# Patient Record
Sex: MALE | Race: WHITE | Employment: OTHER | URBAN - METROPOLITAN AREA
[De-identification: names, ages, dates, MRNs, and addresses within clinical notes are randomized per-mention and may not be internally consistent; named-entity substitution may affect disease eponyms.]

---

## 2018-05-07 ENCOUNTER — APPOINTMENT (OUTPATIENT)
Dept: CT IMAGING | Age: 23
End: 2018-05-07
Payer: COMMERCIAL

## 2018-05-07 ENCOUNTER — HOSPITAL ENCOUNTER (EMERGENCY)
Age: 23
Discharge: HOME | End: 2018-05-07
Attending: EMERGENCY MEDICINE
Payer: COMMERCIAL

## 2018-05-07 VITALS
RESPIRATION RATE: 16 BRPM | TEMPERATURE: 97.4 F | SYSTOLIC BLOOD PRESSURE: 137 MMHG | WEIGHT: 153 LBS | DIASTOLIC BLOOD PRESSURE: 83 MMHG | OXYGEN SATURATION: 96 % | BODY MASS INDEX: 23.19 KG/M2 | HEART RATE: 66 BPM | HEIGHT: 68 IN

## 2018-05-07 DIAGNOSIS — M25.511 CHRONIC RIGHT SHOULDER PAIN: ICD-10-CM

## 2018-05-07 DIAGNOSIS — G43.909 MIGRAINE WITHOUT STATUS MIGRAINOSUS, NOT INTRACTABLE, UNSPECIFIED MIGRAINE TYPE: Primary | ICD-10-CM

## 2018-05-07 DIAGNOSIS — G89.29 CHRONIC RIGHT SHOULDER PAIN: ICD-10-CM

## 2018-05-07 PROCEDURE — 6360000002 HC RX W HCPCS: Performed by: PHYSICIAN ASSISTANT

## 2018-05-07 PROCEDURE — 70450 CT HEAD/BRAIN W/O DYE: CPT

## 2018-05-07 PROCEDURE — 96374 THER/PROPH/DIAG INJ IV PUSH: CPT

## 2018-05-07 PROCEDURE — 99284 EMERGENCY DEPT VISIT MOD MDM: CPT

## 2018-05-07 RX ORDER — METOCLOPRAMIDE HYDROCHLORIDE 5 MG/ML
10 INJECTION INTRAMUSCULAR; INTRAVENOUS ONCE
Status: DISCONTINUED | OUTPATIENT
Start: 2018-05-07 | End: 2018-05-07 | Stop reason: HOSPADM

## 2018-05-07 RX ORDER — 0.9 % SODIUM CHLORIDE 0.9 %
10 VIAL (ML) INJECTION PRN
Status: DISCONTINUED | OUTPATIENT
Start: 2018-05-07 | End: 2018-05-07 | Stop reason: HOSPADM

## 2018-05-07 RX ORDER — DIPHENHYDRAMINE HYDROCHLORIDE 50 MG/ML
12.5 INJECTION INTRAMUSCULAR; INTRAVENOUS ONCE
Status: COMPLETED | OUTPATIENT
Start: 2018-05-07 | End: 2018-05-07

## 2018-05-07 RX ORDER — NAPROXEN 500 MG/1
500 TABLET ORAL 2 TIMES DAILY
Qty: 14 TABLET | Refills: 0 | Status: SHIPPED | OUTPATIENT
Start: 2018-05-07 | End: 2018-07-18

## 2018-05-07 RX ADMIN — DIPHENHYDRAMINE HYDROCHLORIDE 12.5 MG: 50 INJECTION, SOLUTION INTRAMUSCULAR; INTRAVENOUS at 15:58

## 2018-05-07 ASSESSMENT — PAIN DESCRIPTION - FREQUENCY: FREQUENCY: CONTINUOUS

## 2018-05-07 ASSESSMENT — PAIN DESCRIPTION - DESCRIPTORS: DESCRIPTORS: PATIENT UNABLE TO DESCRIBE

## 2018-05-07 ASSESSMENT — PAIN DESCRIPTION - ORIENTATION: ORIENTATION: RIGHT

## 2018-05-07 ASSESSMENT — PAIN DESCRIPTION - LOCATION: LOCATION: HEAD;SHOULDER

## 2018-05-07 ASSESSMENT — PAIN SCALES - GENERAL: PAINLEVEL_OUTOF10: 10

## 2018-05-07 ASSESSMENT — PAIN DESCRIPTION - PAIN TYPE: TYPE: CHRONIC PAIN

## 2018-05-07 ASSESSMENT — PAIN DESCRIPTION - ONSET: ONSET: ON-GOING

## 2018-05-10 ENCOUNTER — HOSPITAL ENCOUNTER (EMERGENCY)
Age: 23
Discharge: PSYCHIATRIC OR SUBSTANCE USE FACILITY | End: 2018-05-11
Attending: EMERGENCY MEDICINE
Payer: COMMERCIAL

## 2018-05-10 DIAGNOSIS — F32.A DEPRESSION, UNSPECIFIED DEPRESSION TYPE: Primary | ICD-10-CM

## 2018-05-10 LAB
ACETAMINOPHEN LEVEL: <5 MCG/ML (ref 10–30)
ALBUMIN SERPL-MCNC: 4.4 G/DL (ref 3.5–5.2)
ALP BLD-CCNC: 72 U/L (ref 40–129)
ALT SERPL-CCNC: 25 U/L (ref 0–40)
AMPHETAMINE SCREEN, URINE: NOT DETECTED
ANION GAP SERPL CALCULATED.3IONS-SCNC: 10 MMOL/L (ref 7–16)
AST SERPL-CCNC: 21 U/L (ref 0–39)
BARBITURATE SCREEN URINE: NOT DETECTED
BASOPHILS ABSOLUTE: 0.04 E9/L (ref 0–0.2)
BASOPHILS RELATIVE PERCENT: 0.4 % (ref 0–2)
BENZODIAZEPINE SCREEN, URINE: NOT DETECTED
BILIRUB SERPL-MCNC: 0.5 MG/DL (ref 0–1.2)
BILIRUBIN URINE: ABNORMAL
BLOOD, URINE: NEGATIVE
BUN BLDV-MCNC: 10 MG/DL (ref 6–20)
CALCIUM SERPL-MCNC: 9.2 MG/DL (ref 8.6–10.2)
CANNABINOID SCREEN URINE: POSITIVE
CHLORIDE BLD-SCNC: 104 MMOL/L (ref 98–107)
CLARITY: CLEAR
CO2: 31 MMOL/L (ref 22–29)
COCAINE METABOLITE SCREEN URINE: NOT DETECTED
COLOR: YELLOW
CREAT SERPL-MCNC: 1 MG/DL (ref 0.7–1.2)
EKG ATRIAL RATE: 60 BPM
EKG P AXIS: 38 DEGREES
EKG P-R INTERVAL: 148 MS
EKG Q-T INTERVAL: 388 MS
EKG QRS DURATION: 96 MS
EKG QTC CALCULATION (BAZETT): 388 MS
EKG R AXIS: 101 DEGREES
EKG T AXIS: 44 DEGREES
EKG VENTRICULAR RATE: 60 BPM
EOSINOPHILS ABSOLUTE: 0.06 E9/L (ref 0.05–0.5)
EOSINOPHILS RELATIVE PERCENT: 0.7 % (ref 0–6)
ETHANOL: <10 MG/DL (ref 0–0.08)
GFR AFRICAN AMERICAN: >60
GFR NON-AFRICAN AMERICAN: >60 ML/MIN/1.73
GLUCOSE BLD-MCNC: 75 MG/DL (ref 74–109)
GLUCOSE URINE: NEGATIVE MG/DL
HCT VFR BLD CALC: 48.5 % (ref 37–54)
HEMOGLOBIN: 16.6 G/DL (ref 12.5–16.5)
IMMATURE GRANULOCYTES #: 0.02 E9/L
IMMATURE GRANULOCYTES %: 0.2 % (ref 0–5)
KETONES, URINE: NEGATIVE MG/DL
LEUKOCYTE ESTERASE, URINE: NEGATIVE
LYMPHOCYTES ABSOLUTE: 2.72 E9/L (ref 1.5–4)
LYMPHOCYTES RELATIVE PERCENT: 30 % (ref 20–42)
MCH RBC QN AUTO: 31 PG (ref 26–35)
MCHC RBC AUTO-ENTMCNC: 34.2 % (ref 32–34.5)
MCV RBC AUTO: 90.7 FL (ref 80–99.9)
METHADONE SCREEN, URINE: NOT DETECTED
MONOCYTES ABSOLUTE: 0.48 E9/L (ref 0.1–0.95)
MONOCYTES RELATIVE PERCENT: 5.3 % (ref 2–12)
NEUTROPHILS ABSOLUTE: 5.75 E9/L (ref 1.8–7.3)
NEUTROPHILS RELATIVE PERCENT: 63.4 % (ref 43–80)
NITRITE, URINE: NEGATIVE
OPIATE SCREEN URINE: NOT DETECTED
PDW BLD-RTO: 12.1 FL (ref 11.5–15)
PH UA: 8 (ref 5–9)
PHENCYCLIDINE SCREEN URINE: NOT DETECTED
PLATELET # BLD: 209 E9/L (ref 130–450)
PMV BLD AUTO: 10.9 FL (ref 7–12)
POTASSIUM SERPL-SCNC: 3.9 MMOL/L (ref 3.5–5)
PROPOXYPHENE SCREEN: NOT DETECTED
PROTEIN UA: NEGATIVE MG/DL
RBC # BLD: 5.35 E12/L (ref 3.8–5.8)
SALICYLATE, SERUM: <0.3 MG/DL (ref 0–30)
SODIUM BLD-SCNC: 145 MMOL/L (ref 132–146)
SPECIFIC GRAVITY UA: 1.01 (ref 1–1.03)
T4 FREE: 1.53 NG/DL (ref 0.93–1.7)
TOTAL PROTEIN: 6.5 G/DL (ref 6.4–8.3)
TRICYCLIC ANTIDEPRESSANTS SCREEN SERUM: NEGATIVE NG/ML
TSH SERPL DL<=0.05 MIU/L-ACNC: 0.93 UIU/ML (ref 0.27–4.2)
UROBILINOGEN, URINE: >=8 E.U./DL
WBC # BLD: 9.1 E9/L (ref 4.5–11.5)

## 2018-05-10 PROCEDURE — 84443 ASSAY THYROID STIM HORMONE: CPT

## 2018-05-10 PROCEDURE — 81003 URINALYSIS AUTO W/O SCOPE: CPT

## 2018-05-10 PROCEDURE — 93005 ELECTROCARDIOGRAM TRACING: CPT | Performed by: PHYSICIAN ASSISTANT

## 2018-05-10 PROCEDURE — 80307 DRUG TEST PRSMV CHEM ANLYZR: CPT

## 2018-05-10 PROCEDURE — 99284 EMERGENCY DEPT VISIT MOD MDM: CPT

## 2018-05-10 PROCEDURE — 36415 COLL VENOUS BLD VENIPUNCTURE: CPT

## 2018-05-10 PROCEDURE — 85025 COMPLETE CBC W/AUTO DIFF WBC: CPT

## 2018-05-10 PROCEDURE — G0480 DRUG TEST DEF 1-7 CLASSES: HCPCS

## 2018-05-10 PROCEDURE — 80053 COMPREHEN METABOLIC PANEL: CPT

## 2018-05-10 PROCEDURE — 84439 ASSAY OF FREE THYROXINE: CPT

## 2018-05-10 RX ORDER — ARIPIPRAZOLE 30 MG/1
30 TABLET ORAL DAILY
COMMUNITY
End: 2021-03-12

## 2018-05-10 RX ORDER — LORAZEPAM 1 MG/1
1 TABLET ORAL 2 TIMES DAILY
COMMUNITY
End: 2018-07-18

## 2018-05-11 VITALS
HEART RATE: 67 BPM | SYSTOLIC BLOOD PRESSURE: 132 MMHG | TEMPERATURE: 97.8 F | RESPIRATION RATE: 18 BRPM | WEIGHT: 153 LBS | DIASTOLIC BLOOD PRESSURE: 65 MMHG | OXYGEN SATURATION: 94 % | BODY MASS INDEX: 23.26 KG/M2

## 2018-05-15 LAB — CANNABINOIDS CONF, URINE: >500 NG/ML

## 2018-07-18 ENCOUNTER — HOSPITAL ENCOUNTER (EMERGENCY)
Age: 23
Discharge: HOME | End: 2018-07-18
Attending: EMERGENCY MEDICINE
Payer: COMMERCIAL

## 2018-07-18 VITALS
HEART RATE: 78 BPM | SYSTOLIC BLOOD PRESSURE: 139 MMHG | WEIGHT: 160 LBS | TEMPERATURE: 98.2 F | OXYGEN SATURATION: 98 % | BODY MASS INDEX: 24.25 KG/M2 | RESPIRATION RATE: 16 BRPM | HEIGHT: 68 IN | DIASTOLIC BLOOD PRESSURE: 84 MMHG

## 2018-07-18 DIAGNOSIS — F41.1 ANXIETY STATE: ICD-10-CM

## 2018-07-18 DIAGNOSIS — F32.A DEPRESSION, UNSPECIFIED DEPRESSION TYPE: Primary | ICD-10-CM

## 2018-07-18 LAB
ACETAMINOPHEN LEVEL: <5 MCG/ML (ref 10–30)
ALBUMIN SERPL-MCNC: 4.6 G/DL (ref 3.5–5.2)
ALP BLD-CCNC: 74 U/L (ref 40–129)
ALT SERPL-CCNC: 39 U/L (ref 0–40)
ANION GAP SERPL CALCULATED.3IONS-SCNC: 8 MMOL/L (ref 7–16)
AST SERPL-CCNC: 26 U/L (ref 0–39)
BASOPHILS ABSOLUTE: 0.03 E9/L (ref 0–0.2)
BASOPHILS RELATIVE PERCENT: 0.3 % (ref 0–2)
BILIRUB SERPL-MCNC: 0.3 MG/DL (ref 0–1.2)
BILIRUBIN URINE: NEGATIVE
BLOOD, URINE: NEGATIVE
BUN BLDV-MCNC: 11 MG/DL (ref 6–20)
CALCIUM SERPL-MCNC: 9.2 MG/DL (ref 8.6–10.2)
CHLORIDE BLD-SCNC: 104 MMOL/L (ref 98–107)
CLARITY: CLEAR
CO2: 30 MMOL/L (ref 22–29)
COLOR: YELLOW
CREAT SERPL-MCNC: 1 MG/DL (ref 0.7–1.2)
EOSINOPHILS ABSOLUTE: 0.06 E9/L (ref 0.05–0.5)
EOSINOPHILS RELATIVE PERCENT: 0.6 % (ref 0–6)
ETHANOL: <10 MG/DL (ref 0–0.08)
GFR AFRICAN AMERICAN: >60
GFR NON-AFRICAN AMERICAN: >60 ML/MIN/1.73
GLUCOSE BLD-MCNC: 79 MG/DL (ref 74–109)
GLUCOSE URINE: NEGATIVE MG/DL
HCT VFR BLD CALC: 51.5 % (ref 37–54)
HEMOGLOBIN: 17.8 G/DL (ref 12.5–16.5)
IMMATURE GRANULOCYTES #: 0.02 E9/L
IMMATURE GRANULOCYTES %: 0.2 % (ref 0–5)
KETONES, URINE: NEGATIVE MG/DL
LEUKOCYTE ESTERASE, URINE: NEGATIVE
LYMPHOCYTES ABSOLUTE: 2.69 E9/L (ref 1.5–4)
LYMPHOCYTES RELATIVE PERCENT: 26.2 % (ref 20–42)
MCH RBC QN AUTO: 30.8 PG (ref 26–35)
MCHC RBC AUTO-ENTMCNC: 34.6 % (ref 32–34.5)
MCV RBC AUTO: 89.1 FL (ref 80–99.9)
MONOCYTES ABSOLUTE: 0.52 E9/L (ref 0.1–0.95)
MONOCYTES RELATIVE PERCENT: 5.1 % (ref 2–12)
NEUTROPHILS ABSOLUTE: 6.94 E9/L (ref 1.8–7.3)
NEUTROPHILS RELATIVE PERCENT: 67.6 % (ref 43–80)
NITRITE, URINE: NEGATIVE
PDW BLD-RTO: 12.4 FL (ref 11.5–15)
PH UA: 8 (ref 5–9)
PLATELET # BLD: 218 E9/L (ref 130–450)
PMV BLD AUTO: 10.6 FL (ref 7–12)
POTASSIUM SERPL-SCNC: 4.3 MMOL/L (ref 3.5–5)
PROTEIN UA: NEGATIVE MG/DL
RBC # BLD: 5.78 E12/L (ref 3.8–5.8)
SALICYLATE, SERUM: <0.3 MG/DL (ref 0–30)
SODIUM BLD-SCNC: 142 MMOL/L (ref 132–146)
SPECIFIC GRAVITY UA: 1.01 (ref 1–1.03)
TOTAL PROTEIN: 7 G/DL (ref 6.4–8.3)
TRICYCLIC ANTIDEPRESSANTS SCREEN SERUM: NEGATIVE NG/ML
UROBILINOGEN, URINE: 0.2 E.U./DL
WBC # BLD: 10.3 E9/L (ref 4.5–11.5)

## 2018-07-18 PROCEDURE — 81003 URINALYSIS AUTO W/O SCOPE: CPT

## 2018-07-18 PROCEDURE — 99284 EMERGENCY DEPT VISIT MOD MDM: CPT

## 2018-07-18 PROCEDURE — 85025 COMPLETE CBC W/AUTO DIFF WBC: CPT

## 2018-07-18 PROCEDURE — G0480 DRUG TEST DEF 1-7 CLASSES: HCPCS

## 2018-07-18 PROCEDURE — 80307 DRUG TEST PRSMV CHEM ANLYZR: CPT

## 2018-07-18 PROCEDURE — 36415 COLL VENOUS BLD VENIPUNCTURE: CPT

## 2018-07-18 PROCEDURE — 80053 COMPREHEN METABOLIC PANEL: CPT

## 2018-07-18 RX ORDER — BUSPIRONE HYDROCHLORIDE 5 MG/1
5 TABLET ORAL 2 TIMES DAILY
Status: ON HOLD | COMMUNITY
End: 2021-03-18 | Stop reason: HOSPADM

## 2018-07-18 ASSESSMENT — PATIENT HEALTH QUESTIONNAIRE - PHQ9: SUM OF ALL RESPONSES TO PHQ QUESTIONS 1-9: 4

## 2018-07-18 NOTE — ED NOTES
Pt agreeable to discharge and contracts for safety pt escorted to waiting area to await transportation      Eb Soliz RN  07/18/18 [address]

## 2018-07-18 NOTE — ED PROVIDER NOTES
fL    Platelets 835 967 - 592 E9/L    MPV 10.6 7.0 - 12.0 fL    Neutrophils % 67.6 43.0 - 80.0 %    Immature Granulocytes % 0.2 0.0 - 5.0 %    Lymphocytes % 26.2 20.0 - 42.0 %    Monocytes % 5.1 2.0 - 12.0 %    Eosinophils % 0.6 0.0 - 6.0 %    Basophils % 0.3 0.0 - 2.0 %    Neutrophils # 6.94 1.80 - 7.30 E9/L    Immature Granulocytes # 0.02 E9/L    Lymphocytes # 2.69 1.50 - 4.00 E9/L    Monocytes # 0.52 0.10 - 0.95 E9/L    Eosinophils # 0.06 0.05 - 0.50 E9/L    Basophils # 0.03 0.00 - 0.20 E9/L   Comprehensive Metabolic Panel   Result Value Ref Range    Sodium 142 132 - 146 mmol/L    Potassium 4.3 3.5 - 5.0 mmol/L    Chloride 104 98 - 107 mmol/L    CO2 30 (H) 22 - 29 mmol/L    Anion Gap 8 7 - 16 mmol/L    Glucose 79 74 - 109 mg/dL    BUN 11 6 - 20 mg/dL    CREATININE 1.0 0.7 - 1.2 mg/dL    GFR Non-African American >60 >=60 mL/min/1.73    GFR African American >60     Calcium 9.2 8.6 - 10.2 mg/dL    Total Protein 7.0 6.4 - 8.3 g/dL    Alb 4.6 3.5 - 5.2 g/dL    Total Bilirubin 0.3 0.0 - 1.2 mg/dL    Alkaline Phosphatase 74 40 - 129 U/L    ALT 39 0 - 40 U/L    AST 26 0 - 39 U/L   Serum Drug Screen   Result Value Ref Range    Ethanol Lvl <10 mg/dL    Acetaminophen Level <5.0 (L) 10.0 - 84.5 mcg/mL    Salicylate, Serum <4.2 0.0 - 30.0 mg/dL    TCA Scrn NEGATIVE Cutoff:300 ng/mL   Urinalysis   Result Value Ref Range    Color, UA Yellow Straw/Yellow    Clarity, UA Clear Clear    Glucose, Ur Negative Negative mg/dL    Bilirubin Urine Negative Negative    Ketones, Urine Negative Negative mg/dL    Specific Gravity, UA 1.010 1.005 - 1.030    Blood, Urine Negative Negative    pH, UA 8.0 5.0 - 9.0    Protein, UA Negative Negative mg/dL    Urobilinogen, Urine 0.2 <2.0 E.U./dL    Nitrite, Urine Negative Negative    Leukocyte Esterase, Urine Negative Negative       RADIOLOGY:  Interpreted by Radiologist.  No orders to display       ------------------------- NURSING NOTES AND VITALS REVIEWED ---------------------------   The nursing

## 2018-07-18 NOTE — ED NOTES
FIRST PROVIDER CONTACT ASSESSMENT NOTE      Department of Emergency Medicine   7/18/18  10:32 AM    Chief Complaint: Psychiatric Evaluation (treats at [facility], \"I think my anxiety and depression is getting worse\" Denies SI/HI/A/V hallucinations)      History of Present Illness:    Souleymane Shukla is a 20 y.o. male who presents to the ED by private car for ongoing depression. Denies suicidal thoughts. Follows with [facility]. Focused Screening Exam:  Constitutional:  Alert, appears stated age and is in no distress. *ALLERGIES*     Patient has no known allergies.      ED Triage Vitals [07/18/18 1030]   BP Temp Temp Source Pulse Resp SpO2 Height Weight   139/84 98.6 °F (37 °C) Temporal 77 15 98 % 5' 8\" (1.727 m) 160 lb (72.6 kg)        Initial Plan of Care:  Initiate Treatment-Testing, Proceed toTreatment Area When Bed Available for ED Attending/MLP to Continue Care    -----------------END OF FIRST PROVIDER CONTACT ASSESSMENT NOTE--------------  Electronically signed by Jackeline Cagle PA-C   DD: 7/18/18       Jackeline Cagle PA-C  07/18/18 1030

## 2018-07-19 NOTE — ED NOTES
Pt is a 21 yo male who is a walk-in to the ED, pt not on a pink slip, accompanied by no one. Per triage: Treats at [facility], [address] my anxiety and depression is getting worse. \" Denies SI/HI/A/V hallucinations. Per pt:\" I've been having a little more anxiety, I tend to get depressed, I'm not suicidal or homicidal, I just think I need something to do, I don't get out of the house much. \"    Reports single, no children, lives with parents not employed, applied SSI. Reports no significant stressors at this time. Reports open at [facility], reports prescribed Abilify, Buspar, Seroquel. Hx of psych admission 7-S 4/29/2017,  And 11/16/2016, hx at [facility]. Reports cannabis use, very infrequent alcohol use, reports no hx of D&A treatment. Denies suicidal ideation intent or plan, reports hx of 1 previous attempt by overdose on 10 Seroquel 100mg which he was not treated for, denies any other attempts, denies homicidal ideation intent or plan. Alert, oriented x3, mood stable, affect broad, eye contact good, behavior is cooperative, appropriate, speech is normal in rate flow and volume, thought form is logical, thought content is clear, denies A/V hallucinations, delusions, paranoia. Discussed IOP with pt, reports he thinks this is a good idea, given information, also given information on [facility] and [facility] groups, request faxed to [facility], pt reports he will present either tomorrow morning or Monday morning. Discussed discharge to [facility] with Dr Elie Alicia who is agreeable.       [address] [facility], [place], [place]  07/18/18 [id number]

## 2019-11-18 ENCOUNTER — HOSPITAL ENCOUNTER (EMERGENCY)
Age: 24
Discharge: HOME | End: 2019-11-18
Attending: EMERGENCY MEDICINE
Payer: COMMERCIAL

## 2019-11-18 VITALS
WEIGHT: 165 LBS | HEART RATE: 96 BPM | TEMPERATURE: 98.3 F | SYSTOLIC BLOOD PRESSURE: 146 MMHG | OXYGEN SATURATION: 95 % | DIASTOLIC BLOOD PRESSURE: 88 MMHG | BODY MASS INDEX: 25.09 KG/M2

## 2019-11-18 DIAGNOSIS — F39 MOOD DISORDER (HCC): Primary | ICD-10-CM

## 2019-11-18 LAB
ACETAMINOPHEN LEVEL: <5 MCG/ML (ref 10–30)
ALBUMIN SERPL-MCNC: 4.5 G/DL (ref 3.5–5.2)
ALP BLD-CCNC: 66 U/L (ref 40–129)
ALT SERPL-CCNC: 20 U/L (ref 0–40)
AMPHETAMINE SCREEN, URINE: NOT DETECTED
ANION GAP SERPL CALCULATED.3IONS-SCNC: 12 MMOL/L (ref 7–16)
AST SERPL-CCNC: 21 U/L (ref 0–39)
BARBITURATE SCREEN URINE: NOT DETECTED
BASOPHILS ABSOLUTE: 0.04 E9/L (ref 0–0.2)
BASOPHILS RELATIVE PERCENT: 0.4 % (ref 0–2)
BENZODIAZEPINE SCREEN, URINE: NOT DETECTED
BILIRUB SERPL-MCNC: 0.4 MG/DL (ref 0–1.2)
BILIRUBIN URINE: NEGATIVE
BLOOD, URINE: NEGATIVE
BUN BLDV-MCNC: 12 MG/DL (ref 6–20)
CALCIUM SERPL-MCNC: 9.5 MG/DL (ref 8.6–10.2)
CANNABINOID SCREEN URINE: POSITIVE
CHLORIDE BLD-SCNC: 103 MMOL/L (ref 98–107)
CLARITY: CLEAR
CO2: 29 MMOL/L (ref 22–29)
COCAINE METABOLITE SCREEN URINE: NOT DETECTED
COLOR: YELLOW
CREAT SERPL-MCNC: 1.1 MG/DL (ref 0.7–1.2)
EOSINOPHILS ABSOLUTE: 0.09 E9/L (ref 0.05–0.5)
EOSINOPHILS RELATIVE PERCENT: 0.9 % (ref 0–6)
ETHANOL: <10 MG/DL (ref 0–0.08)
FENTANYL SCREEN, URINE: NOT DETECTED
GFR AFRICAN AMERICAN: >60
GFR NON-AFRICAN AMERICAN: >60 ML/MIN/1.73
GLUCOSE BLD-MCNC: 73 MG/DL (ref 74–99)
GLUCOSE URINE: NEGATIVE MG/DL
HCT VFR BLD CALC: 51.2 % (ref 37–54)
HEMOGLOBIN: 17.5 G/DL (ref 12.5–16.5)
IMMATURE GRANULOCYTES #: 0.02 E9/L
IMMATURE GRANULOCYTES %: 0.2 % (ref 0–5)
KETONES, URINE: NEGATIVE MG/DL
LEUKOCYTE ESTERASE, URINE: NEGATIVE
LYMPHOCYTES ABSOLUTE: 3.07 E9/L (ref 1.5–4)
LYMPHOCYTES RELATIVE PERCENT: 32.1 % (ref 20–42)
Lab: ABNORMAL
MCH RBC QN AUTO: 31.1 PG (ref 26–35)
MCHC RBC AUTO-ENTMCNC: 34.2 % (ref 32–34.5)
MCV RBC AUTO: 91.1 FL (ref 80–99.9)
METHADONE SCREEN, URINE: NOT DETECTED
MONOCYTES ABSOLUTE: 0.57 E9/L (ref 0.1–0.95)
MONOCYTES RELATIVE PERCENT: 6 % (ref 2–12)
NEUTROPHILS ABSOLUTE: 5.76 E9/L (ref 1.8–7.3)
NEUTROPHILS RELATIVE PERCENT: 60.4 % (ref 43–80)
NITRITE, URINE: NEGATIVE
OPIATE SCREEN URINE: NOT DETECTED
OXYCODONE URINE: NOT DETECTED
PDW BLD-RTO: 12.3 FL (ref 11.5–15)
PH UA: 6.5 (ref 5–9)
PHENCYCLIDINE SCREEN URINE: NOT DETECTED
PLATELET # BLD: 233 E9/L (ref 130–450)
PMV BLD AUTO: 10.5 FL (ref 7–12)
POTASSIUM SERPL-SCNC: 3.6 MMOL/L (ref 3.5–5)
PROTEIN UA: NEGATIVE MG/DL
RBC # BLD: 5.62 E12/L (ref 3.8–5.8)
SALICYLATE, SERUM: <0.3 MG/DL (ref 0–30)
SODIUM BLD-SCNC: 144 MMOL/L (ref 132–146)
SPECIFIC GRAVITY UA: 1.02 (ref 1–1.03)
T4 TOTAL: 7.3 MCG/DL (ref 4.5–11.7)
TOTAL PROTEIN: 6.7 G/DL (ref 6.4–8.3)
TRICYCLIC ANTIDEPRESSANTS SCREEN SERUM: NEGATIVE NG/ML
TSH SERPL DL<=0.05 MIU/L-ACNC: 1.4 UIU/ML (ref 0.27–4.2)
UROBILINOGEN, URINE: 1 E.U./DL
WBC # BLD: 9.6 E9/L (ref 4.5–11.5)

## 2019-11-18 PROCEDURE — 80307 DRUG TEST PRSMV CHEM ANLYZR: CPT

## 2019-11-18 PROCEDURE — 99284 EMERGENCY DEPT VISIT MOD MDM: CPT

## 2019-11-18 PROCEDURE — 36415 COLL VENOUS BLD VENIPUNCTURE: CPT

## 2019-11-18 PROCEDURE — 84443 ASSAY THYROID STIM HORMONE: CPT

## 2019-11-18 PROCEDURE — 81003 URINALYSIS AUTO W/O SCOPE: CPT

## 2019-11-18 PROCEDURE — G0480 DRUG TEST DEF 1-7 CLASSES: HCPCS

## 2019-11-18 PROCEDURE — 80053 COMPREHEN METABOLIC PANEL: CPT

## 2019-11-18 PROCEDURE — 84436 ASSAY OF TOTAL THYROXINE: CPT

## 2019-11-18 PROCEDURE — 85025 COMPLETE CBC W/AUTO DIFF WBC: CPT

## 2020-09-18 ENCOUNTER — HOSPITAL ENCOUNTER (EMERGENCY)
Age: 25
Discharge: HOME | End: 2020-09-18
Payer: COMMERCIAL

## 2020-09-18 VITALS
DIASTOLIC BLOOD PRESSURE: 81 MMHG | SYSTOLIC BLOOD PRESSURE: 135 MMHG | TEMPERATURE: 97.6 F | WEIGHT: 150 LBS | BODY MASS INDEX: 22.22 KG/M2 | HEIGHT: 69 IN | RESPIRATION RATE: 14 BRPM | HEART RATE: 98 BPM | OXYGEN SATURATION: 98 %

## 2020-09-18 PROCEDURE — 99283 EMERGENCY DEPT VISIT LOW MDM: CPT

## 2020-09-18 PROCEDURE — 6370000000 HC RX 637 (ALT 250 FOR IP): Performed by: NURSE PRACTITIONER

## 2020-09-18 PROCEDURE — 99284 EMERGENCY DEPT VISIT MOD MDM: CPT

## 2020-09-18 RX ORDER — IBUPROFEN 800 MG/1
800 TABLET ORAL ONCE
Status: COMPLETED | OUTPATIENT
Start: 2020-09-18 | End: 2020-09-18

## 2020-09-18 RX ORDER — AMOXICILLIN AND CLAVULANATE POTASSIUM 875; 125 MG/1; MG/1
1 TABLET, FILM COATED ORAL 2 TIMES DAILY
Qty: 20 TABLET | Refills: 0 | Status: SHIPPED | OUTPATIENT
Start: 2020-09-18 | End: 2020-09-28

## 2020-09-18 RX ORDER — IBUPROFEN 800 MG/1
800 TABLET ORAL 2 TIMES DAILY PRN
Qty: 10 TABLET | Refills: 0 | Status: ON HOLD | OUTPATIENT
Start: 2020-09-18 | End: 2021-03-18 | Stop reason: HOSPADM

## 2020-09-18 RX ORDER — AMOXICILLIN AND CLAVULANATE POTASSIUM 875; 125 MG/1; MG/1
1 TABLET, FILM COATED ORAL ONCE
Status: COMPLETED | OUTPATIENT
Start: 2020-09-18 | End: 2020-09-18

## 2020-09-18 RX ADMIN — IBUPROFEN 800 MG: 800 TABLET ORAL at 14:40

## 2020-09-18 RX ADMIN — AMOXICILLIN AND CLAVULANATE POTASSIUM 1 TABLET: 875; 125 TABLET, FILM COATED ORAL at 14:40

## 2020-09-18 ASSESSMENT — PAIN SCALES - GENERAL: PAINLEVEL_OUTOF10: 10

## 2020-12-21 ENCOUNTER — HOSPITAL ENCOUNTER (EMERGENCY)
Age: 25
Discharge: HOME | End: 2020-12-21
Payer: COMMERCIAL

## 2020-12-21 VITALS
HEART RATE: 89 BPM | TEMPERATURE: 98.4 F | DIASTOLIC BLOOD PRESSURE: 86 MMHG | RESPIRATION RATE: 16 BRPM | OXYGEN SATURATION: 96 % | BODY MASS INDEX: 22.22 KG/M2 | SYSTOLIC BLOOD PRESSURE: 135 MMHG | HEIGHT: 69 IN | WEIGHT: 150 LBS

## 2020-12-21 LAB
ACETAMINOPHEN LEVEL: <5 MCG/ML (ref 10–30)
ALBUMIN SERPL-MCNC: 4.4 G/DL (ref 3.5–5.2)
ALP BLD-CCNC: 84 U/L (ref 40–129)
ALT SERPL-CCNC: 34 U/L (ref 0–40)
AMPHETAMINE SCREEN, URINE: NOT DETECTED
ANION GAP SERPL CALCULATED.3IONS-SCNC: 7 MMOL/L (ref 7–16)
AST SERPL-CCNC: 39 U/L (ref 0–39)
BARBITURATE SCREEN URINE: NOT DETECTED
BASOPHILS ABSOLUTE: 0.03 E9/L (ref 0–0.2)
BASOPHILS RELATIVE PERCENT: 0.3 % (ref 0–2)
BENZODIAZEPINE SCREEN, URINE: NOT DETECTED
BILIRUB SERPL-MCNC: 0.5 MG/DL (ref 0–1.2)
BILIRUBIN URINE: NEGATIVE
BLOOD, URINE: NEGATIVE
BUN BLDV-MCNC: 10 MG/DL (ref 6–20)
CALCIUM SERPL-MCNC: 9.9 MG/DL (ref 8.6–10.2)
CANNABINOID SCREEN URINE: POSITIVE
CHLORIDE BLD-SCNC: 103 MMOL/L (ref 98–107)
CLARITY: CLEAR
CO2: 30 MMOL/L (ref 22–29)
COCAINE METABOLITE SCREEN URINE: NOT DETECTED
COLOR: YELLOW
CREAT SERPL-MCNC: 0.9 MG/DL (ref 0.7–1.2)
EOSINOPHILS ABSOLUTE: 0.08 E9/L (ref 0.05–0.5)
EOSINOPHILS RELATIVE PERCENT: 0.8 % (ref 0–6)
ETHANOL: <10 MG/DL (ref 0–0.08)
FENTANYL SCREEN, URINE: NOT DETECTED
GFR AFRICAN AMERICAN: >60
GFR NON-AFRICAN AMERICAN: >60 ML/MIN/1.73
GLUCOSE BLD-MCNC: 102 MG/DL (ref 74–99)
GLUCOSE URINE: NEGATIVE MG/DL
HCT VFR BLD CALC: 47.9 % (ref 37–54)
HEMOGLOBIN: 16.2 G/DL (ref 12.5–16.5)
IMMATURE GRANULOCYTES #: 0.02 E9/L
IMMATURE GRANULOCYTES %: 0.2 % (ref 0–5)
KETONES, URINE: NEGATIVE MG/DL
LEUKOCYTE ESTERASE, URINE: NEGATIVE
LYMPHOCYTES ABSOLUTE: 2.72 E9/L (ref 1.5–4)
LYMPHOCYTES RELATIVE PERCENT: 26.8 % (ref 20–42)
Lab: ABNORMAL
MCH RBC QN AUTO: 30.8 PG (ref 26–35)
MCHC RBC AUTO-ENTMCNC: 33.8 % (ref 32–34.5)
MCV RBC AUTO: 91.1 FL (ref 80–99.9)
METHADONE SCREEN, URINE: NOT DETECTED
MONOCYTES ABSOLUTE: 0.66 E9/L (ref 0.1–0.95)
MONOCYTES RELATIVE PERCENT: 6.5 % (ref 2–12)
NEUTROPHILS ABSOLUTE: 6.63 E9/L (ref 1.8–7.3)
NEUTROPHILS RELATIVE PERCENT: 65.4 % (ref 43–80)
NITRITE, URINE: NEGATIVE
OPIATE SCREEN URINE: NOT DETECTED
OXYCODONE URINE: NOT DETECTED
PDW BLD-RTO: 12.3 FL (ref 11.5–15)
PH UA: 6 (ref 5–9)
PHENCYCLIDINE SCREEN URINE: NOT DETECTED
PLATELET # BLD: 246 E9/L (ref 130–450)
PMV BLD AUTO: 10.7 FL (ref 7–12)
POTASSIUM REFLEX MAGNESIUM: 4.2 MMOL/L (ref 3.5–5)
PROTEIN UA: NEGATIVE MG/DL
RBC # BLD: 5.26 E12/L (ref 3.8–5.8)
SALICYLATE, SERUM: <0.3 MG/DL (ref 0–30)
SODIUM BLD-SCNC: 140 MMOL/L (ref 132–146)
SPECIFIC GRAVITY UA: <=1.005 (ref 1–1.03)
TOTAL PROTEIN: 7.1 G/DL (ref 6.4–8.3)
TRICYCLIC ANTIDEPRESSANTS SCREEN SERUM: NEGATIVE NG/ML
TROPONIN: <0.01 NG/ML (ref 0–0.03)
UROBILINOGEN, URINE: 1 E.U./DL
WBC # BLD: 10.1 E9/L (ref 4.5–11.5)

## 2020-12-21 PROCEDURE — 96372 THER/PROPH/DIAG INJ SC/IM: CPT

## 2020-12-21 PROCEDURE — 80307 DRUG TEST PRSMV CHEM ANLYZR: CPT

## 2020-12-21 PROCEDURE — 6360000002 HC RX W HCPCS: Performed by: PHYSICIAN ASSISTANT

## 2020-12-21 PROCEDURE — G0480 DRUG TEST DEF 1-7 CLASSES: HCPCS

## 2020-12-21 PROCEDURE — 99284 EMERGENCY DEPT VISIT MOD MDM: CPT

## 2020-12-21 PROCEDURE — 80053 COMPREHEN METABOLIC PANEL: CPT

## 2020-12-21 PROCEDURE — 2580000003 HC RX 258: Performed by: PHYSICIAN ASSISTANT

## 2020-12-21 PROCEDURE — 84484 ASSAY OF TROPONIN QUANT: CPT

## 2020-12-21 PROCEDURE — 81003 URINALYSIS AUTO W/O SCOPE: CPT

## 2020-12-21 PROCEDURE — 85025 COMPLETE CBC W/AUTO DIFF WBC: CPT

## 2020-12-21 RX ORDER — ORPHENADRINE CITRATE 30 MG/ML
60 INJECTION INTRAMUSCULAR; INTRAVENOUS ONCE
Status: COMPLETED | OUTPATIENT
Start: 2020-12-21 | End: 2020-12-21

## 2020-12-21 RX ORDER — 0.9 % SODIUM CHLORIDE 0.9 %
1000 INTRAVENOUS SOLUTION INTRAVENOUS ONCE
Status: COMPLETED | OUTPATIENT
Start: 2020-12-21 | End: 2020-12-21

## 2020-12-21 RX ADMIN — ORPHENADRINE CITRATE 60 MG: 30 INJECTION INTRAMUSCULAR; INTRAVENOUS at 14:28

## 2020-12-21 RX ADMIN — SODIUM CHLORIDE 1000 ML: 9 INJECTION, SOLUTION INTRAVENOUS at 14:28

## 2020-12-21 NOTE — ED NOTES
FIRST PROVIDER CONTACT ASSESSMENT NOTE      Department of Emergency Medicine   12/21/20  1:19 PM EST    Chief Complaint: Spasms (body spasms, denies pain)    History of Present Illness:   Virginia Glover is a 30 y.o. male who presents to the ED for feeling like he is having tremors and twitches to both upper extremities and entire body. He denies numbness/tingling, chest pain, SOB, or recent trauma/injury. He denies recent drug use. He does have a history of \"pinched nerve in his neck\" but that improved with PT. He does report marijuana use. He denies any new medications. Focused Physical Exam:  VS:  /86   Pulse 89   Temp 98.4 °F (36.9 °C) (Tympanic)   Resp 16   Ht 5' 9\" (1.753 m)   Wt 150 lb (68 kg)   SpO2 96%   BMI 22.15 kg/m²      General: Alert and in no apparent distress, appears mildly anxious  CVS: Regular rate for age, normal rhythm  Resp: Clear and equal bilaterally with good airflow, no respiratory distress      Medical History:  has a past medical history of ADD (attention deficit disorder with hyperactivity), Anxiety, Cerebral hemorrhage ([address] Cerebral palsy ([address] and Depression. Surgical History:  has a past surgical history that includes Foot surgery and hernia repair. Social History:  reports that he has been smoking cigarettes. He has been smoking about 1.00 pack per day. He has quit using smokeless tobacco. He reports current drug use. Drug: Marijuana. He reports that he does not drink alcohol. Family History: family history is not on file. *ALLERGIES*     Patient has no known allergies.      Initial Plan of Care:  Initiate Treatment-Testing, Proceed toTreatment Area When Bed Available for ED Attending/MLP to Continue Care    -----------------END OF FIRST PROVIDER CONTACT ASSESSMENT NOTE--------------  Electronically signed by Lane Alpers, PA-C   DD: 12/21/20         Lane Alpers, PA-C  12/21/20 [address] [facility], [place]  12/21/20 [id number]

## 2020-12-21 NOTE — ED PROVIDER NOTES
[address]  Department of Emergency Medicine   ED  Encounter Note  Admit Date/RoomTime: 2020  1:26 PM  ED Room: [facility] [address]    NAME: Mariela Ramirez  : 1995  MRN: [id number]     Chief Complaint:  Spasms (body spasms, denies pain)    HISTORY OF PRESENT ILLNESS        Mariela Ramirez is a 30 y.o. male who presents to the ED by private vehicle for spasming all over his body for the past few days. Patient denies pain. Patient states symptoms are mild in severity. Patient denies anything making it better or worse. Patient denies this happening in the past.  Denies fever/chills, headache, vision change, dizziness, chest pain, dyspnea, abdominal pain, NVD, numbness/weakness. ROS   Pertinent positives and negatives are stated within HPI, all other systems reviewed and are negative. Past Medical History:  has a past medical history of ADD (attention deficit disorder with hyperactivity), Anxiety, Cerebral hemorrhage ([address] Cerebral palsy ([address] and Depression. Surgical History:  has a past surgical history that includes Foot surgery and hernia repair. Social History:  reports that he has been smoking cigarettes. He has been smoking about 1.00 pack per day. He has quit using smokeless tobacco. He reports current drug use. Drug: Marijuana. He reports that he does not drink alcohol. Family History: family history is not on file. Allergies: Patient has no known allergies. PHYSICAL EXAM   Oxygen Saturation Interpretation: Normal.        ED Triage Vitals   BP Temp Temp Source Pulse Resp SpO2 Height Weight   20 1324 20 1325 20 1325 20 1324 20 1324 20 1324 20 1324 20 1324   135/86 98.4 °F (36.9 °C) Tympanic 89 16 96 % 5' 9\" (1.753 m) 150 lb (68 kg)         Physical Exam  Constitutional/General: Alert and oriented x3, well appearing, non toxic. HEENT:  NC/NT. PERRLA,  Airway patent.   Neck: Supple, full ROM, non tender to palpation in the midline, no stridor, no crepitus, no meningeal signs  Respiratory: Lungs clear to auscultation bilaterally, no wheezes, rales, or rhonchi. Not in respiratory distress  CV:  Regular rate. Regular rhythm. No murmurs, gallops, or rubs. 2+ distal pulses  Chest: No chest wall tenderness  GI:  Abdomen Soft, Non tender, Non distended. +BS. No rebound, guarding, or rigidity. No pulsatile masses. Musculoskeletal: Moves all extremities x 4. Warm and well perfused, no clubbing, cyanosis, or edema. Capillary refill <3 seconds  Integument: skin warm and dry. No rashes.    Lymphatic: no lymphadenopathy noted  Neurologic: GCS 15, no focal deficits, symmetric strength 5/5 in the upper and lower extremities bilaterally  Psychiatric: Normal Affect      Lab / Imaging Results   (All laboratory and radiology results have been personally reviewed by myself)  Labs:  Results for orders placed or performed during the hospital encounter of 12/21/20   CBC Auto Differential   Result Value Ref Range    WBC 10.1 4.5 - 11.5 E9/L    RBC 5.26 3.80 - 5.80 E12/L    Hemoglobin 16.2 12.5 - 16.5 g/dL    Hematocrit 47.9 37.0 - 54.0 %    MCV 91.1 80.0 - 99.9 fL    MCH 30.8 26.0 - 35.0 pg    MCHC 33.8 32.0 - 34.5 %    RDW 12.3 11.5 - 15.0 fL    Platelets 790 091 - 439 E9/L    MPV 10.7 7.0 - 12.0 fL    Neutrophils % 65.4 43.0 - 80.0 %    Immature Granulocytes % 0.2 0.0 - 5.0 %    Lymphocytes % 26.8 20.0 - 42.0 %    Monocytes % 6.5 2.0 - 12.0 %    Eosinophils % 0.8 0.0 - 6.0 %    Basophils % 0.3 0.0 - 2.0 %    Neutrophils Absolute 6.63 1.80 - 7.30 E9/L    Immature Granulocytes # 0.02 E9/L    Lymphocytes Absolute 2.72 1.50 - 4.00 E9/L    Monocytes Absolute 0.66 0.10 - 0.95 E9/L    Eosinophils Absolute 0.08 0.05 - 0.50 E9/L    Basophils Absolute 0.03 0.00 - 0.20 E9/L   Comprehensive Metabolic Panel w/ Reflex to MG   Result Value Ref Range    Sodium 140 132 - 146 mmol/L    Potassium reflex Magnesium 4.2 3.5 - 5.0 mmol/L    Chloride 103 98 - 107 mmol/L    CO2 30 (H) 22 - 29 mmol/L    Anion Gap 7 7 - 16 mmol/L    Glucose 102 (H) 74 - 99 mg/dL    BUN 10 6 - 20 mg/dL    CREATININE 0.9 0.7 - 1.2 mg/dL    GFR Non-African American >60 >=60 mL/min/1.73    GFR African American >60     Calcium 9.9 8.6 - 10.2 mg/dL    Total Protein 7.1 6.4 - 8.3 g/dL    Alb 4.4 3.5 - 5.2 g/dL    Total Bilirubin 0.5 0.0 - 1.2 mg/dL    Alkaline Phosphatase 84 40 - 129 U/L    ALT 34 0 - 40 U/L    AST 39 0 - 39 U/L   Serum Drug Screen   Result Value Ref Range    Ethanol Lvl <10 mg/dL    Acetaminophen Level <5.0 (L) 10.0 - 91.4 mcg/mL    Salicylate, Serum <9.6 0.0 - 30.0 mg/dL    TCA Scrn NEGATIVE Cutoff:300 ng/mL   Urine Drug Screen   Result Value Ref Range    Amphetamine Screen, Urine NOT DETECTED Negative <1000 ng/mL    Barbiturate Screen, Ur NOT DETECTED Negative < 200 ng/mL    Benzodiazepine Screen, Urine NOT DETECTED Negative < 200 ng/mL    Cannabinoid Scrn, Ur POSITIVE (A) Negative < 50ng/mL    Cocaine Metabolite Screen, Urine NOT DETECTED Negative < 300 ng/mL    Opiate Scrn, Ur NOT DETECTED Negative < 300ng/mL    PCP Screen, Urine NOT DETECTED Negative < 25 ng/mL    Methadone Screen, Urine NOT DETECTED Negative <300 ng/mL    Oxycodone Urine NOT DETECTED Negative <100 ng/mL    FENTANYL SCREEN, URINE NOT DETECTED Negative <1 ng/mL    Drug Screen Comment: see below    Urinalysis   Result Value Ref Range    Color, UA Yellow Straw/Yellow    Clarity, UA Clear Clear    Glucose, Ur Negative Negative mg/dL    Bilirubin Urine Negative Negative    Ketones, Urine Negative Negative mg/dL    Specific Gravity, UA <=1.005 1.005 - 1.030    Blood, Urine Negative Negative    pH, UA 6.0 5.0 - 9.0    Protein, UA Negative Negative mg/dL    Urobilinogen, Urine 1.0 <2.0 E.U./dL    Nitrite, Urine Negative Negative    Leukocyte Esterase, Urine Negative Negative   Troponin   Result Value Ref Range    Troponin <0.01 0.00 - 0.03 ng/mL     Imaging:   All Radiology results interpreted by Radiologist unless otherwise noted. No orders to display             ED Course / Medical Decision Making     Medications   0.9 % sodium chloride bolus (0 mLs Intravenous Stopped 12/21/20 1609)   orphenadrine (NORFLEX) injection 60 mg (60 mg Intramuscular Given 12/21/20 1428)     ED Course as of Dec 21 1733   Mon Dec 21, 2020   1533 Patient feeling much better. [KL]      ED Course User Index  [KL] Stephania Bliss PA-C         Consultations:             None    Procedures:   none    MDM: Patient presenting with muscle spasming. Patient is no acute distress, afebrile, nontoxic in appearance. Patient's labs are stable. Patient's symptoms resolved after fluids and Norflex. Recommended patient follow-up with PCP. Recommended patient return to the ED with new or worsening of symptoms. Plan of Care/Counseling:  I reviewed today's visit with the patient in addition to providing specific details for the plan of care and counseling regarding the diagnosis and prognosis. Questions are answered at this time and are agreeable with the plan. ASSESSMENT     1. Spasm of muscle      This patient's ED course included: a personal history and physicial examination and multiple bedside re-evaluations  This patient has remained hemodynamically stable during their ED course. PLAN   Discharge to home. Patient condition is stable. New Medications     Discharge Medication List as of 12/21/2020  3:38 PM        Electronically signed by Stephania Bliss PA-C   DD: 12/21/20  **This report was transcribed using voice recognition software. Every effort was made to ensure accuracy; however, inadvertent computerized transcription errors may be present.   Emilia Dhillon PA-C  12/21/20 1586

## 2021-03-12 ENCOUNTER — HOSPITAL ENCOUNTER (EMERGENCY)
Age: 26
Discharge: ANOTHER HOSPITAL | End: 2021-03-13
Attending: EMERGENCY MEDICINE
Payer: COMMERCIAL

## 2021-03-12 ENCOUNTER — HOSPITAL ENCOUNTER (EMERGENCY)
Age: 26
Discharge: HOME | End: 2021-03-12
Attending: EMERGENCY MEDICINE
Payer: COMMERCIAL

## 2021-03-12 ENCOUNTER — APPOINTMENT (OUTPATIENT)
Dept: GENERAL RADIOLOGY | Age: 26
End: 2021-03-12
Payer: COMMERCIAL

## 2021-03-12 VITALS
SYSTOLIC BLOOD PRESSURE: 121 MMHG | WEIGHT: 150 LBS | BODY MASS INDEX: 22.73 KG/M2 | OXYGEN SATURATION: 98 % | HEIGHT: 68 IN | RESPIRATION RATE: 18 BRPM | HEART RATE: 75 BPM | TEMPERATURE: 97.1 F | DIASTOLIC BLOOD PRESSURE: 75 MMHG

## 2021-03-12 DIAGNOSIS — F39 MOOD DISORDER (HCC): ICD-10-CM

## 2021-03-12 DIAGNOSIS — R45.851 SUICIDAL THOUGHTS: Primary | ICD-10-CM

## 2021-03-12 DIAGNOSIS — R06.00 DYSPNEA, UNSPECIFIED TYPE: Primary | ICD-10-CM

## 2021-03-12 LAB
ACETAMINOPHEN LEVEL: <5 MCG/ML (ref 10–30)
ACETAMINOPHEN LEVEL: <5 MCG/ML (ref 10–30)
ALBUMIN SERPL-MCNC: 4.3 G/DL (ref 3.5–5.2)
ALP BLD-CCNC: 68 U/L (ref 40–129)
ALT SERPL-CCNC: 23 U/L (ref 0–40)
AMPHETAMINE SCREEN, URINE: NORMAL
AMPHETAMINE SCREEN, URINE: NOT DETECTED
ANION GAP SERPL CALCULATED.3IONS-SCNC: 6 MMOL/L (ref 7–16)
AST SERPL-CCNC: 32 U/L (ref 0–39)
BARBITURATE SCREEN URINE: NORMAL
BARBITURATE SCREEN URINE: NOT DETECTED
BASOPHILS ABSOLUTE: 0.03 E9/L (ref 0–0.2)
BASOPHILS RELATIVE PERCENT: 0.3 % (ref 0–2)
BENZODIAZEPINE SCREEN, URINE: NORMAL
BENZODIAZEPINE SCREEN, URINE: NOT DETECTED
BILIRUB SERPL-MCNC: 0.3 MG/DL (ref 0–1.2)
BILIRUBIN URINE: NEGATIVE
BLOOD, URINE: NEGATIVE
BUN BLDV-MCNC: 6 MG/DL (ref 6–20)
CALCIUM SERPL-MCNC: 9 MG/DL (ref 8.6–10.2)
CANNABINOID SCREEN URINE: NORMAL
CANNABINOID SCREEN URINE: POSITIVE
CHLORIDE BLD-SCNC: 104 MMOL/L (ref 98–107)
CLARITY: CLEAR
CO2: 29 MMOL/L (ref 22–29)
COCAINE METABOLITE SCREEN URINE: NORMAL
COCAINE METABOLITE SCREEN URINE: NOT DETECTED
COLOR: YELLOW
CREAT SERPL-MCNC: 0.9 MG/DL (ref 0.7–1.2)
EOSINOPHILS ABSOLUTE: 0.1 E9/L (ref 0.05–0.5)
EOSINOPHILS RELATIVE PERCENT: 1.1 % (ref 0–6)
ETHANOL: <10 MG/DL (ref 0–0.08)
ETHANOL: <10 MG/DL (ref 0–0.08)
FENTANYL SCREEN, URINE: NORMAL
FENTANYL SCREEN, URINE: NOT DETECTED
GFR AFRICAN AMERICAN: >60
GFR NON-AFRICAN AMERICAN: >60 ML/MIN/1.73
GLUCOSE BLD-MCNC: 109 MG/DL (ref 74–99)
GLUCOSE URINE: NEGATIVE MG/DL
HCT VFR BLD CALC: 48.6 % (ref 37–54)
HEMOGLOBIN: 16.2 G/DL (ref 12.5–16.5)
IMMATURE GRANULOCYTES #: 0.02 E9/L
IMMATURE GRANULOCYTES %: 0.2 % (ref 0–5)
KETONES, URINE: ABNORMAL MG/DL
LEUKOCYTE ESTERASE, URINE: NEGATIVE
LYMPHOCYTES ABSOLUTE: 2.12 E9/L (ref 1.5–4)
LYMPHOCYTES RELATIVE PERCENT: 22.3 % (ref 20–42)
Lab: ABNORMAL
Lab: NORMAL
MCH RBC QN AUTO: 30.9 PG (ref 26–35)
MCHC RBC AUTO-ENTMCNC: 33.3 % (ref 32–34.5)
MCV RBC AUTO: 92.6 FL (ref 80–99.9)
METHADONE SCREEN, URINE: NORMAL
METHADONE SCREEN, URINE: NOT DETECTED
MONOCYTES ABSOLUTE: 0.45 E9/L (ref 0.1–0.95)
MONOCYTES RELATIVE PERCENT: 4.7 % (ref 2–12)
NEUTROPHILS ABSOLUTE: 6.78 E9/L (ref 1.8–7.3)
NEUTROPHILS RELATIVE PERCENT: 71.4 % (ref 43–80)
NITRITE, URINE: NEGATIVE
OPIATE SCREEN URINE: NORMAL
OPIATE SCREEN URINE: NOT DETECTED
OXYCODONE URINE: NORMAL
OXYCODONE URINE: NOT DETECTED
PDW BLD-RTO: 12.4 FL (ref 11.5–15)
PH UA: 6 (ref 5–9)
PHENCYCLIDINE SCREEN URINE: NORMAL
PHENCYCLIDINE SCREEN URINE: NOT DETECTED
PLATELET # BLD: 216 E9/L (ref 130–450)
PMV BLD AUTO: 10.8 FL (ref 7–12)
POTASSIUM REFLEX MAGNESIUM: 3.9 MMOL/L (ref 3.5–5)
PROTEIN UA: NEGATIVE MG/DL
RBC # BLD: 5.25 E12/L (ref 3.8–5.8)
SALICYLATE, SERUM: <0.3 MG/DL (ref 0–30)
SALICYLATE, SERUM: <0.3 MG/DL (ref 0–30)
SARS-COV-2, NAAT: NOT DETECTED
SODIUM BLD-SCNC: 139 MMOL/L (ref 132–146)
SPECIFIC GRAVITY UA: >=1.03 (ref 1–1.03)
TOTAL PROTEIN: 6.5 G/DL (ref 6.4–8.3)
TRICYCLIC ANTIDEPRESSANTS SCREEN SERUM: NEGATIVE NG/ML
TRICYCLIC ANTIDEPRESSANTS SCREEN SERUM: NEGATIVE NG/ML
TROPONIN: <0.01 NG/ML (ref 0–0.03)
UROBILINOGEN, URINE: 2 E.U./DL
WBC # BLD: 9.5 E9/L (ref 4.5–11.5)

## 2021-03-12 PROCEDURE — 80053 COMPREHEN METABOLIC PANEL: CPT

## 2021-03-12 PROCEDURE — 82077 ASSAY SPEC XCP UR&BREATH IA: CPT

## 2021-03-12 PROCEDURE — 80143 DRUG ASSAY ACETAMINOPHEN: CPT

## 2021-03-12 PROCEDURE — 81003 URINALYSIS AUTO W/O SCOPE: CPT

## 2021-03-12 PROCEDURE — 84484 ASSAY OF TROPONIN QUANT: CPT

## 2021-03-12 PROCEDURE — 36415 COLL VENOUS BLD VENIPUNCTURE: CPT

## 2021-03-12 PROCEDURE — 6370000000 HC RX 637 (ALT 250 FOR IP): Performed by: EMERGENCY MEDICINE

## 2021-03-12 PROCEDURE — 93005 ELECTROCARDIOGRAM TRACING: CPT | Performed by: EMERGENCY MEDICINE

## 2021-03-12 PROCEDURE — 80179 DRUG ASSAY SALICYLATE: CPT

## 2021-03-12 PROCEDURE — 80307 DRUG TEST PRSMV CHEM ANLYZR: CPT

## 2021-03-12 PROCEDURE — 99285 EMERGENCY DEPT VISIT HI MDM: CPT

## 2021-03-12 PROCEDURE — 85025 COMPLETE CBC W/AUTO DIFF WBC: CPT

## 2021-03-12 PROCEDURE — 87635 SARS-COV-2 COVID-19 AMP PRB: CPT

## 2021-03-12 PROCEDURE — 71045 X-RAY EXAM CHEST 1 VIEW: CPT

## 2021-03-12 RX ORDER — ACETAMINOPHEN 500 MG
1000 TABLET ORAL ONCE
Status: COMPLETED | OUTPATIENT
Start: 2021-03-13 | End: 2021-03-12

## 2021-03-12 RX ADMIN — ACETAMINOPHEN 1000 MG: 500 TABLET ORAL at 23:56

## 2021-03-12 ASSESSMENT — ENCOUNTER SYMPTOMS
RHINORRHEA: 0
ABDOMINAL PAIN: 0
SHORTNESS OF BREATH: 1
CONSTIPATION: 0
NAUSEA: 0
VOMITING: 0
BLOOD IN STOOL: 0
VOMITING: 0
SORE THROAT: 0
EYE PAIN: 0
COUGH: 0
ABDOMINAL PAIN: 0
WHEEZING: 0
COUGH: 0
SORE THROAT: 0
BACK PAIN: 0
DIARRHEA: 0
BACK PAIN: 0
NAUSEA: 0
DIARRHEA: 0
SHORTNESS OF BREATH: 0

## 2021-03-12 ASSESSMENT — PATIENT HEALTH QUESTIONNAIRE - PHQ9: SUM OF ALL RESPONSES TO PHQ QUESTIONS 1-9: 27

## 2021-03-12 NOTE — ED PROVIDER NOTES
Patient is a 68-year-old male with past medical history of mood disorder, depression, anxiety, cerebral hemorrhage as a  presenting to the emergency room with shortness of breath, hallucinations and fatigue. Symptoms mild to moderate in severity and constant onset. Patient's fatigue and shortness of breath has been ongoing for approximately 2 to 3 months. He states it has been constant all day every day. He did not want to tell anyone about the symptoms before but states today he felt like he was more fatigued than usual and wanted to be evaluated. He states he is also having hallucinations. He states these have been ongoing for many weeks worsening over the last few days. He states he just uses things and that his [place] is on the wall\". He sees multiple colors and different images. He states he is also hearing voices its more just voices telling him yes or no other not telling him to do anything. He denies any suicidal ideations or homicidal ideations. He follows with [facility] for [facility] care. He states he was last seen there within the last week and they changed his medications. He has been taking them as prescribed. He denies any history of DVT or PE. He denies any cough, fevers. He has no pain in his chest.  Symptoms are exacerbated by nothing improved by nothing. Review of Systems   Constitutional: Positive for fatigue. Negative for chills and fever. HENT: Negative for congestion and sore throat. Eyes: Positive for visual disturbance (\"seeing things\"). Negative for pain. Respiratory: Positive for shortness of breath. Negative for cough and wheezing. Cardiovascular: Negative for chest pain and leg swelling. Gastrointestinal: Negative for abdominal pain, diarrhea, nausea and vomiting. Musculoskeletal: Negative for arthralgias and back pain. Skin: Negative for rash and wound. Neurological: Negative for weakness, numbness and headaches. Psychiatric/Behavioral: Positive for hallucinations. All other systems reviewed and are negative. Physical Exam  Vitals signs and nursing note reviewed. Constitutional:       Appearance: He is well-developed. HENT:      Head: Normocephalic and atraumatic. Eyes:      Pupils: Pupils are equal, round, and reactive to light. Neck:      Musculoskeletal: Normal range of motion and neck supple. Cardiovascular:      Rate and Rhythm: Normal rate and regular rhythm. Heart sounds: Normal heart sounds. No murmur. Pulmonary:      Effort: Pulmonary effort is normal. No respiratory distress. Breath sounds: Normal breath sounds. No wheezing or rales. Abdominal:      Palpations: Abdomen is soft. Tenderness: There is no abdominal tenderness. There is no guarding or rebound. Musculoskeletal: Normal range of motion. Right lower leg: He exhibits no tenderness. Left lower leg: He exhibits no tenderness. Skin:     General: Skin is warm and dry. Capillary Refill: Capillary refill takes less than 2 seconds. Neurological:      Mental Status: He is alert and oriented to person, place, and time. Coordination: Coordination normal.   Psychiatric:      Comments: Tangential speech, pressured speech          Procedures     MDM  Number of Diagnoses or Management Options  Patient presents emergency room for shortness of breath, fatigue, hallucinations. He is clinically stable, vital signs stable, nontoxic-appearing. Initial differential diagnosis included but was not limited to pneumonia, electrolyte imbalance, PE, bipolar. Patient is not suicidal, nor homicidal.  PE considered but less likely, symptoms ongoing for many months and not consistent with history and physical exam findings. Work-up initiated and essentially unremarkable. Chest x-ray with no acute process. EKG with no acute ischemic changes and troponin less than 0.01.   Less concern for ACS or acute cardiac etiology at not on file. The patients home medications have been reviewed. Allergies: Patient has no known allergies.     -------------------------------------------------- RESULTS -------------------------------------------------  Labs:  Results for orders placed or performed during the hospital encounter of 03/12/21   CBC Auto Differential   Result Value Ref Range    WBC 9.5 4.5 - 11.5 E9/L    RBC 5.25 3.80 - 5.80 E12/L    Hemoglobin 16.2 12.5 - 16.5 g/dL    Hematocrit 48.6 37.0 - 54.0 %    MCV 92.6 80.0 - 99.9 fL    MCH 30.9 26.0 - 35.0 pg    MCHC 33.3 32.0 - 34.5 %    RDW 12.4 11.5 - 15.0 fL    Platelets 669 175 - 258 E9/L    MPV 10.8 7.0 - 12.0 fL    Neutrophils % 71.4 43.0 - 80.0 %    Immature Granulocytes % 0.2 0.0 - 5.0 %    Lymphocytes % 22.3 20.0 - 42.0 %    Monocytes % 4.7 2.0 - 12.0 %    Eosinophils % 1.1 0.0 - 6.0 %    Basophils % 0.3 0.0 - 2.0 %    Neutrophils Absolute 6.78 1.80 - 7.30 E9/L    Immature Granulocytes # 0.02 E9/L    Lymphocytes Absolute 2.12 1.50 - 4.00 E9/L    Monocytes Absolute 0.45 0.10 - 0.95 E9/L    Eosinophils Absolute 0.10 0.05 - 0.50 E9/L    Basophils Absolute 0.03 0.00 - 0.20 E9/L   Comprehensive Metabolic Panel w/ Reflex to MG   Result Value Ref Range    Sodium 139 132 - 146 mmol/L    Potassium reflex Magnesium 3.9 3.5 - 5.0 mmol/L    Chloride 104 98 - 107 mmol/L    CO2 29 22 - 29 mmol/L    Anion Gap 6 (L) 7 - 16 mmol/L    Glucose 109 (H) 74 - 99 mg/dL    BUN 6 6 - 20 mg/dL    CREATININE 0.9 0.7 - 1.2 mg/dL    GFR Non-African American >60 >=60 mL/min/1.73    GFR African American >60     Calcium 9.0 8.6 - 10.2 mg/dL    Total Protein 6.5 6.4 - 8.3 g/dL    Albumin 4.3 3.5 - 5.2 g/dL    Total Bilirubin 0.3 0.0 - 1.2 mg/dL    Alkaline Phosphatase 68 40 - 129 U/L    ALT 23 0 - 40 U/L    AST 32 0 - 39 U/L   Troponin   Result Value Ref Range    Troponin <0.01 0.00 - 0.03 ng/mL   Serum Drug Screen   Result Value Ref Range    Ethanol Lvl <10 mg/dL    Acetaminophen Level <5.0 (L) 10.0 - 30.0 mcg/mL    Salicylate, Serum <8.3 0.0 - 30.0 mg/dL    TCA Scrn NEGATIVE Cutoff:300 ng/mL   EKG 12 Lead   Result Value Ref Range    Ventricular Rate 64 BPM    Atrial Rate 64 BPM    P-R Interval 162 ms    QRS Duration 96 ms    Q-T Interval 386 ms    QTc Calculation (Bazett) 398 ms    P Axis 27 degrees    R Axis 95 degrees    T Axis 55 degrees       Radiology:  XR CHEST PORTABLE   Final Result   No radiographic evidence of acute abnormality                   ------------------------- NURSING NOTES AND VITALS REVIEWED ---------------------------  Date / Time Roomed:  3/12/2021  9:56 AM  ED Bed Assignment:  24/24    The nursing notes within the ED encounter and vital signs as below have been reviewed. /75   Pulse 75   Temp 97.1 °F (36.2 °C) (Tympanic)   Resp 18   Ht 5' 8\" (1.727 m)   Wt 150 lb (68 kg)   SpO2 98%   BMI 22.81 kg/m²   Oxygen Saturation Interpretation: Normal      ------------------------------------------ PROGRESS NOTES ------------------------------------------  ED COURSE MEDICATIONS:              Medications - No data to display    I have spoken with the patient and discussed todays results, in addition to providing specific details for the plan of care and counseling regarding the diagnosis and prognosis. Their questions are answered at this time and they are agreeable with the plan. I discussed at length with them reasons for immediate return here for re evaluation. They will followup with primary care by calling their office tomorrow. --------------------------------- ADDITIONAL PROVIDER NOTES ---------------------------------  At this time the patient is without objective evidence of an acute process requiring hospitalization or inpatient management. They have remained hemodynamically stable throughout their entire ED visit and are stable for discharge with outpatient follow-up.      The plan has been discussed in detail and they are aware of the specific conditions for emergent return, as well as the importance of follow-up. Discharge Medication List as of 3/12/2021  2:29 PM          Diagnosis:  1. Dyspnea, unspecified type    2. Mood disorder ([facility]        Disposition:  Patient's disposition: Discharge to home  Patient's condition is stable.        [facility] DO [place]  Resident  03/12/21 1977

## 2021-03-12 NOTE — ED PROVIDER NOTES
Cristobal Huynh is a 27 y.o. male with a PMHx significant for marijuana abuse, mood disorder, bipolar who presents for evaluation of suicidal ideation, beginning prior to arrival.  The complaint has been persistent, moderate in severity, and worsened by nothing. The patient states that he has history of bipolar suicidal ideation in the past.  Notes that he has been getting worse lately as he feels noticed anything for him. Patient states he has attempted suicide in the past with overdose, once or twice. States today he would go home and either do the same or something that worked. Patient notes he has had both auditory visual hallucinations since starting on his psychiatric medication years ago. Notes that he frequently has suicidal ideations, has been getting progressively worse. Patient denies any homicidal ideations at this time. The history is provided by the patient and medical records. Review of Systems   Constitutional: Negative for chills and fever. HENT: Negative for postnasal drip, rhinorrhea and sore throat. Eyes: Negative for visual disturbance. Respiratory: Negative for cough and shortness of breath. Cardiovascular: Negative for chest pain. Gastrointestinal: Negative for abdominal pain, blood in stool, constipation, diarrhea, nausea and vomiting. Genitourinary: Negative for difficulty urinating, dysuria and urgency. Musculoskeletal: Negative for back pain. Skin: Negative for rash. Neurological: Negative for dizziness, numbness and headaches. Psychiatric/Behavioral: Positive for behavioral problems and suicidal ideas. Negative for hallucinations and self-injury. The patient is not nervous/anxious and is not hyperactive. Physical Exam  Vitals signs and nursing note reviewed. Constitutional:       General: He is not in acute distress. Appearance: Normal appearance. He is well-developed. He is not ill-appearing.    HENT:      Head: Normocephalic and atraumatic. Right Ear: External ear normal.      Left Ear: External ear normal.   Eyes:      Extraocular Movements: Extraocular movements intact. Conjunctiva/sclera: Conjunctivae normal.   Neck:      Musculoskeletal: Normal range of motion and neck supple. Cardiovascular:      Rate and Rhythm: Normal rate and regular rhythm. Heart sounds: Normal heart sounds. No murmur. Pulmonary:      Effort: Pulmonary effort is normal. No respiratory distress. Breath sounds: Normal breath sounds. No stridor. No wheezing. Abdominal:      General: There is no distension. Palpations: Abdomen is soft. There is no mass. Tenderness: There is no abdominal tenderness. There is no rebound. Musculoskeletal:         General: No tenderness. Skin:     General: Skin is warm and dry. Coloration: Skin is not jaundiced or pale. Neurological:      General: No focal deficit present. Mental Status: He is alert and oriented to person, place, and time. Cranial Nerves: No cranial nerve deficit. Coordination: Coordination normal.   Psychiatric:         Behavior: Behavior is withdrawn. Behavior is not agitated, slowed, aggressive, hyperactive or combative. Behavior is cooperative. Thought Content: Thought content includes suicidal ideation. Thought content does not include homicidal ideation. Thought content does not include homicidal or suicidal plan. Procedures     TETE Landers III presents to the ED for evaluation of suicidal ideation. The patient has had suicide attempts in the past. Patient states that he would take pills or what ever it takes. Does admit to having visual and auditory hallucinations, but states this has been ongoing for years now. The patient is medically cleared to be evaluated by social work.   Disposition per social work.        --------------------------------------------- PAST HISTORY ---------------------------------------------  Past Medical History:  has a past medical history of ADD (attention deficit disorder with hyperactivity), Anxiety, Cerebral hemorrhage ([facility] [address] Cerebral palsy ([facility] [address] and Depression. Past Surgical History:  has a past surgical history that includes Foot surgery and hernia repair. Social History:  reports that he has been smoking cigarettes. He has been smoking about 1.00 pack per day. He has quit using smokeless tobacco. He reports current drug use. Drug: Marijuana. He reports that he does not drink alcohol. Family History: family history is not on file. The patients home medications have been reviewed. Allergies: Patient has no known allergies.     -------------------------------------------------- RESULTS -------------------------------------------------    LABS:  Results for orders placed or performed during the hospital encounter of 03/12/21   COVID-19, Rapid    Specimen: Nasopharyngeal Swab   Result Value Ref Range    SARS-CoV-2, NAAT Not Detected Not Detected   Urinalysis, reflex to microscopic   Result Value Ref Range    Color, UA Yellow Straw/Yellow    Clarity, UA Clear Clear    Glucose, Ur Negative Negative mg/dL    Bilirubin Urine Negative Negative    Ketones, Urine TRACE (A) Negative mg/dL    Specific Gravity, UA >=1.030 1.005 - 1.030    Blood, Urine Negative Negative    pH, UA 6.0 5.0 - 9.0    Protein, UA Negative Negative mg/dL    Urobilinogen, Urine 2.0 (A) <2.0 E.U./dL    Nitrite, Urine Negative Negative    Leukocyte Esterase, Urine Negative Negative   URINE DRUG SCREEN   Result Value Ref Range    Drug Screen Comment: see below    Serum Drug Screen   Result Value Ref Range    Ethanol Lvl <10 mg/dL    Acetaminophen Level <5.0 (L) 10.0 - 13.5 mcg/mL    Salicylate, Serum <0.1 0.0 - 30.0 mg/dL       RADIOLOGY:  No orders to display     ------------------------ NURSING NOTES AND VITALS REVIEWED ---------------------------  Date / Time Roomed:  3/12/2021  6:14 PM  ED Bed Assignment:  15/15    The nursing notes within the ED encounter and vital signs as below have been reviewed. Patient Vitals for the past 24 hrs:   BP Temp Temp src Pulse Resp SpO2 Height Weight   03/12/21 1815 130/73 -- -- -- -- -- -- --   03/12/21 1812 -- 97.8 °F (36.6 °C) Tympanic 97 18 98 % 5' 8\" (1.727 m) 150 lb (68 kg)       Oxygen Saturation Interpretation: Normal    ------------------------------------------ PROGRESS NOTES ------------------------------------------  Counseling:  I have spoken with the patient and discussed todays results, in addition to providing specific details for the plan of care and counseling regarding the diagnosis and prognosis. Their questions are answered at this time and they are agreeable with the plan of admission.    --------------------------------- ADDITIONAL PROVIDER NOTES ---------------------------------  Consultations:  Spoke with Social work, they will provide consult. .  Discussed case. They will admit the patient. This patient's ED course included: a personal history and physicial examination, re-evaluation prior to disposition and complex medical decision making and emergency management    This patient has remained hemodynamically stable during their ED course. Diagnosis:  1. Suicidal thoughts    2. Mood disorder ([address]        Disposition:  Patient's disposition: As per social workPatient's condition is stable              Karan Paul DO  Resident  03/13/21 0013    ATTENDING PROVIDER ATTESTATION:     Emilia Kiran III presented to the emergency department for evaluation of Suicidal (admits to wanting to harm self denies wanting to harm anyone else )    I have reviewed and discussed the case, including pertinent history (medical, surgical, family and social) and exam findings with the Resident and the Nurse assigned to Emilia Kiran III.   I have personally performed and/or participated in the history, exam, medical decision making, and procedures and agree with all pertinent clinical information. I have reviewed my findings and recommendations with Nabeel Lara III and members of family present at the time of disposition. I, Dr. Alejandro Miles am the primary physician of record for this patient. MDM: The patient is 27 y.o. male  with a past medical history of       Diagnosis Date    ADD (attention deficit disorder with hyperactivity)     Anxiety     Cerebral hemorrhage ([address]     Cerebral palsy ([facility] [address]     Depression      presenting to the emergency department with a chief complaint of Doni ideation. Patient did have labs from a prior ED visit in the day which were reviewed. The patient did have serum drug screen and Covid which were negative. Patient medically cleared. My findings/plan: The primary encounter diagnosis was Suicidal thoughts. A diagnosis of Mood disorder (HCC) was also pertinent to this visit.   Discharge Medication List as of 3/13/2021  7:35 AM        Tonie Del Cid, [address] [place] [address],   03/17/21 9712

## 2021-03-13 ENCOUNTER — HOSPITAL ENCOUNTER (INPATIENT)
Age: 26
LOS: 5 days | Discharge: HOME | DRG: 753 | End: 2021-03-18
Attending: PSYCHIATRY & NEUROLOGY | Admitting: PSYCHIATRY & NEUROLOGY
Payer: COMMERCIAL

## 2021-03-13 VITALS
OXYGEN SATURATION: 99 % | TEMPERATURE: 97.8 F | HEIGHT: 68 IN | DIASTOLIC BLOOD PRESSURE: 68 MMHG | RESPIRATION RATE: 16 BRPM | SYSTOLIC BLOOD PRESSURE: 122 MMHG | BODY MASS INDEX: 22.73 KG/M2 | HEART RATE: 70 BPM | WEIGHT: 150 LBS

## 2021-03-13 PROBLEM — F31.5 BIPOLAR I DISORDER, MOST RECENT EPISODE DEPRESSED, SEVERE WITH PSYCHOTIC FEATURES (HCC): Status: ACTIVE | Noted: 2021-03-13

## 2021-03-13 PROBLEM — F32.9 DEPRESSION, MAJOR, SINGLE EPISODE: Status: ACTIVE | Noted: 2021-03-13

## 2021-03-13 LAB
EKG ATRIAL RATE: 64 BPM
EKG P AXIS: 27 DEGREES
EKG P-R INTERVAL: 162 MS
EKG Q-T INTERVAL: 386 MS
EKG QRS DURATION: 96 MS
EKG QTC CALCULATION (BAZETT): 398 MS
EKG R AXIS: 95 DEGREES
EKG T AXIS: 55 DEGREES
EKG VENTRICULAR RATE: 64 BPM

## 2021-03-13 PROCEDURE — 93010 ELECTROCARDIOGRAM REPORT: CPT | Performed by: INTERNAL MEDICINE

## 2021-03-13 PROCEDURE — 6370000000 HC RX 637 (ALT 250 FOR IP): Performed by: NURSE PRACTITIONER

## 2021-03-13 PROCEDURE — 99221 1ST HOSP IP/OBS SF/LOW 40: CPT | Performed by: PSYCHIATRY & NEUROLOGY

## 2021-03-13 PROCEDURE — 6370000000 HC RX 637 (ALT 250 FOR IP): Performed by: PSYCHIATRY & NEUROLOGY

## 2021-03-13 PROCEDURE — 1240000000 HC EMOTIONAL WELLNESS R&B

## 2021-03-13 PROCEDURE — 6360000002 HC RX W HCPCS: Performed by: NURSE PRACTITIONER

## 2021-03-13 RX ORDER — HALOPERIDOL 5 MG/ML
5 INJECTION INTRAMUSCULAR EVERY 6 HOURS PRN
Status: DISCONTINUED | OUTPATIENT
Start: 2021-03-13 | End: 2021-03-18 | Stop reason: HOSPADM

## 2021-03-13 RX ORDER — BUSPIRONE HYDROCHLORIDE 10 MG/1
30 TABLET ORAL 2 TIMES DAILY
Status: DISCONTINUED | OUTPATIENT
Start: 2021-03-13 | End: 2021-03-18 | Stop reason: HOSPADM

## 2021-03-13 RX ORDER — HYDROXYZINE PAMOATE 50 MG/1
50 CAPSULE ORAL 3 TIMES DAILY PRN
Status: DISCONTINUED | OUTPATIENT
Start: 2021-03-13 | End: 2021-03-18 | Stop reason: HOSPADM

## 2021-03-13 RX ORDER — ARIPIPRAZOLE 15 MG/1
30 TABLET ORAL DAILY
Status: DISCONTINUED | OUTPATIENT
Start: 2021-03-13 | End: 2021-03-18 | Stop reason: HOSPADM

## 2021-03-13 RX ORDER — LOPERAMIDE HYDROCHLORIDE 2 MG/1
2 CAPSULE ORAL 4 TIMES DAILY PRN
Status: DISCONTINUED | OUTPATIENT
Start: 2021-03-13 | End: 2021-03-18 | Stop reason: HOSPADM

## 2021-03-13 RX ORDER — HALOPERIDOL 5 MG
5 TABLET ORAL EVERY 6 HOURS PRN
Status: DISCONTINUED | OUTPATIENT
Start: 2021-03-13 | End: 2021-03-18 | Stop reason: HOSPADM

## 2021-03-13 RX ORDER — ACETAMINOPHEN 325 MG/1
650 TABLET ORAL EVERY 6 HOURS PRN
Status: DISCONTINUED | OUTPATIENT
Start: 2021-03-13 | End: 2021-03-18 | Stop reason: HOSPADM

## 2021-03-13 RX ORDER — MAGNESIUM HYDROXIDE/ALUMINUM HYDROXICE/SIMETHICONE 120; 1200; 1200 MG/30ML; MG/30ML; MG/30ML
30 SUSPENSION ORAL PRN
Status: DISCONTINUED | OUTPATIENT
Start: 2021-03-13 | End: 2021-03-18 | Stop reason: HOSPADM

## 2021-03-13 RX ORDER — TRAZODONE HYDROCHLORIDE 50 MG/1
50 TABLET ORAL NIGHTLY PRN
Status: DISCONTINUED | OUTPATIENT
Start: 2021-03-13 | End: 2021-03-18 | Stop reason: HOSPADM

## 2021-03-13 RX ORDER — MIRTAZAPINE 15 MG/1
45 TABLET, FILM COATED ORAL NIGHTLY
Status: DISCONTINUED | OUTPATIENT
Start: 2021-03-13 | End: 2021-03-18 | Stop reason: HOSPADM

## 2021-03-13 RX ADMIN — LOPERAMIDE HYDROCHLORIDE 2 MG: 2 CAPSULE ORAL at 09:08

## 2021-03-13 RX ADMIN — ACETAMINOPHEN 650 MG: 325 TABLET, FILM COATED ORAL at 20:04

## 2021-03-13 RX ADMIN — HYDROXYZINE PAMOATE 50 MG: 50 CAPSULE ORAL at 23:14

## 2021-03-13 RX ADMIN — HALOPERIDOL LACTATE 5 MG: 5 INJECTION, SOLUTION INTRAMUSCULAR at 21:46

## 2021-03-13 RX ADMIN — BUSPIRONE HYDROCHLORIDE 30 MG: 10 TABLET ORAL at 23:14

## 2021-03-13 RX ADMIN — BUSPIRONE HYDROCHLORIDE 30 MG: 10 TABLET ORAL at 11:45

## 2021-03-13 RX ADMIN — ARIPIPRAZOLE 30 MG: 15 TABLET ORAL at 11:45

## 2021-03-13 RX ADMIN — MIRTAZAPINE 45 MG: 15 TABLET, FILM COATED ORAL at 23:14

## 2021-03-13 ASSESSMENT — PATIENT HEALTH QUESTIONNAIRE - PHQ9: SUM OF ALL RESPONSES TO PHQ QUESTIONS 1-9: 21

## 2021-03-13 ASSESSMENT — PAIN DESCRIPTION - LOCATION: LOCATION: HEAD

## 2021-03-13 ASSESSMENT — PAIN - FUNCTIONAL ASSESSMENT: PAIN_FUNCTIONAL_ASSESSMENT: PREVENTS OR INTERFERES SOME ACTIVE ACTIVITIES AND ADLS

## 2021-03-13 ASSESSMENT — LIFESTYLE VARIABLES: HISTORY_ALCOHOL_USE: NO

## 2021-03-13 ASSESSMENT — PAIN SCALES - GENERAL
PAINLEVEL_OUTOF10: 5
PAINLEVEL_OUTOF10: 0

## 2021-03-13 ASSESSMENT — PAIN DESCRIPTION - PAIN TYPE: TYPE: ACUTE PAIN

## 2021-03-13 ASSESSMENT — SLEEP AND FATIGUE QUESTIONNAIRES: DIFFICULTY ARISING: NO

## 2021-03-13 NOTE — H&P
Department of Psychiatry  TELE PSYCHIATRY/ VIRTUAL ASSESSMENT  History and Physical - Adult   THE PATIENT WAS SEEN THROUGH TELEPSYCHIATRY, IN A REAL-TIME, AUDIO-VIDEO ENCOUNTER, WITH THE PATIENT IN [place] [address] [address] [facility]  Medicare Certification Upon Admission    I certify that this patient's inpatient psychiatric hospital admission is medically necessary for:    [x] (1) Treatment which could reasonably be expected to improve this patient's condition,       [] (2) Or for diagnostic study;     AND     [x](2) The inpatient psychiatric services are provided while the individual is under the care of a physician and are included in the individualized plan of care. Estimated length of stay/service 5-7 days, depending on stability    Plan for post-hospital care follow up with the outpatient provider    Electronically signed by Keith Ghosh MD on 3/13/2021 at 12:11 PM      CHIEF COMPLAINT:  Depressed mood, suicidal ideation    History obtained from:  patient, electronic medical record    Patient was seen after discussing with the treatment team and reviewing the chart    CIRCUMSTANCES OF ADMISSION:   Mr. Ruben Smith III is a 27 y.o. male with a history of Bipolar Disorder, who presented to the ER with c/o depressed mood and suicidal ideation. Per ER notes, \"Pt reports a MH hx of BiPolar, depression anxiety and ADD. Pt reports he is connected to [facility] for outpatient [place] [address] services. Pt reports he tries to be med compliant but is not always. Pt reports he is on Abilify and Remeron and does not believe either are working. Pt is also on Seroquel, Effexor and a mood stabilizer maybe Celexa.     Pt reports increased depression over the past few weeks which has been getting worse until he had thoughts of suicide no plan. Pt doesn't feel like living anymore, no one cares about him. Pt reports he is also having visual hallucination of dust everywhere in different colors.  Pt reports he has never had hallucinations before. \"    HISTORY OF PRESENT ILLNESS:      The patient is a 27 y.o. male with significant past history of Bipolar Disorder, who presented to the ER with c/o depressed mood and suicidal ideation. When interviewed today, the patient said he was feeling suicidal and \"pissed off\" at his father, because his father is too bossy and non-supportive. He said he has been depressed 'his whole life', and that it got worse 'a few months to a year ago\". He said he has been having suicidal ideation, \"a lot\", for the same amount of time. He said the suicidal ideation had gotten worse lately; when asked whether he had chosen any method to do it, he said, [place] way I could, if I could\". He said he had not attempted suicide \"recently\", but he said he had overdosed on Seroquel last year. He said his sleep was poor at home, because \"I can't breathe\". He said his father treats him very badly, and wishes that were different. He said his appetite was \"pretty good\". He denied having homicidal ideation. He complained about having difficulty relaxing and focusing. He acknowledged having auditory hallucinations, of a woman's voice saying \"no\", and visual hallucinations of \"dust and letters on the wall and the bed\". He also acknowledged feeling paranoid about the [facility], about which he thought \"they wanted to torture me and make me sign a contract in blood\". Stressors: relationship with his father    The patient is currently receiving care for the above psychiatric illness.     Medications Prior to Admission:   Medications Prior to Admission: ibuprofen (ADVIL;MOTRIN) 800 MG tablet, Take 1 tablet by mouth 2 times daily as needed for Pain  busPIRone (BUSPAR) 5 MG tablet, Take 5 mg by mouth 2 times daily  QUEtiapine (SEROQUEL XR) 300 MG extended release tablet, Take 100 mg by mouth nightly   He also said he is currently taking Abilify 30 mg/day, Remeron 30 mg/day, Buspar 30 Pulse 74   Temp 97.3 °F (36.3 °C) (Oral)   Resp 16   Ht 5' 8\" (1.727 m)   Wt 150 lb (68 kg)   SpO2 99%   BMI 22.81 kg/m²      Neurologic Exam:   Muscle Strength & Tone: full ROM  Gait: normal gait   Involuntary Movements: No    Mental Status Examination:    Level of consciousness:  within normal limits   Appearance:  hospital attire, seated in bed, fair grooming and fair hygiene  Behavior/Motor:  psychomotor retardation  Attitude toward examiner:  cooperative and fair eye contact  Speech:  spontaneous, normal rate and normal volume   Mood: constricted, decreased range and depressed  Affect:  mood congruent and blunted  Thought processes:  coherent and slow   Thought content:  Homocidal ideation denies  Suicidal Ideation:  present  Delusions:  paranoid  Perceptual Disturbance:  auditory and visual, as above  Cognition:  oriented to person, place, and time   Concentration distractible  Memory fair  Insight limited   Judgement limited   Fund of Knowledge limited          DIAGNOSIS:    Bipolar Disorder, depressed, severe with psychotic features vs. Cannabis induced mood and psychotic disorder  Cannabis use disorder        RISK ASSESSMENT:    SUICIDE RISK ASSESSMENT: high  HOMICIDE: low  AGITATION/VIOLENCE: moderate  ELOPEMENT: low    LABS: REVIEWED TODAY:  Recent Labs     03/12/21  1032   WBC 9.5   HGB 16.2        Recent Labs     03/12/21  1032      K 3.9      CO2 29   BUN 6   CREATININE 0.9   GLUCOSE 109*     Recent Labs     03/12/21  1032   BILITOT 0.3   ALKPHOS 68   AST 32   ALT 23     Lab Results   Component Value Date    LABAMPH SEE BELOW 03/12/2021    LABAMPH NOT DETECTED 03/12/2021    [facility] NOT DETECTED 06/25/2012    BARBSCNU SEE BELOW 03/12/2021    BARBSCNU NOT DETECTED 03/12/2021    LABBENZ SEE BELOW 03/12/2021    LABBENZ NOT DETECTED 03/12/2021    LABBENZ SEE BELOW 04/28/2014    CANNAB POSITIVE  06/25/2012    LABMETH SEE BELOW 03/12/2021    LABMETH NOT DETECTED 03/12/2021 OPIATESCREENURINE SEE BELOW 03/12/2021    OPIATESCREENURINE NOT DETECTED 03/12/2021    PHENCYCLIDINESCREENURINE SEE BELOW 03/12/2021    PHENCYCLIDINESCREENURINE NOT DETECTED 03/12/2021    ETOH <10 03/12/2021     Lab Results   Component Value Date    TSH 1.400 11/18/2019     Lab Results   Component Value Date    LITHIUM 0.19 (L) 06/30/2014     Lab Results   Component Value Date    VALPROATE 86 04/28/2014     Lab Results   Component Value Date    LITHIUM 0.19 06/30/2014    VALPROATE 86 04/28/2014       FURTHER LABS ORDERED :      Radiology   Xr Chest Portable    Result Date: 3/12/2021  EXAMINATION: ONE XRAY VIEW OF THE CHEST 3/12/2021 10:33 am COMPARISON: 09/23/2005 HISTORY: ORDERING SYSTEM PROVIDED HISTORY: shorntess of breath TECHNOLOGIST PROVIDED HISTORY: Reason for exam:->shorntess of breath FINDINGS: The cardiomediastinal silhouette is unremarkable. No infiltrate, effusion, or pneumothorax is seen. No acute osseous abnormality. No radiographic evidence of acute abnormality       EKG: TRACING REVIEWED    TREATMENT PLAN:    Risk Management:  close watch and suicide risk    Collateral Information:  Will obtain collateral information from the family or friends. Will obtain medical records as appropriate from out patient providers  Will consult the hospitalist for a physical exam to rule out any co-morbid physical condition. Home medication Reconciled       New Medications started during this admission :    Will re-start Abilify, Buspar, and Remeron (increased to 45 mg/day). Will consider adding Effexor or other antidepressant if the medication is not effective  Prn Haldol 5mg and Vistaril 50mg q6hr for extreme agitation. Trazodone as ordered for insomnia  Vistaril as ordered for anxiety  Discussed with the patient risk, benefit, alternative and common side effects for the  proposed medication treatment. Patient is consenting to the treatment.     Psychotherapy:   Encourage participation in milieu and group therapy  Individual therapy as needed          Electronically signed by Lizz Whitmore MD on 3/13/2021 at 12:11 PM

## 2021-03-13 NOTE — ED NOTES
Emergency Department [facility] AN AFFILIATE OF [facility] Biopsychosocial Assessment Note    Pt is pink slipped    Chief Complaint:     Pt is a 26 yo male presenting tot he ED for being suicidal, suicidal would like to harm self denies wanting to harm any one else. MSE:    Pt is calm, oriented x 4, alert, congruent affect, fleeting eye contact, clear speech, admits to SI no plan admits to visual hallucinations, denies HI. Reports sleep is up and down, ok appetite. Clinical Summary/History:     Pt reports a MH hx of BiPolar, depression anxiety and ADD. Pt reports he is [facility]. Pt reports he tries to be med compliant but is not always. Pt reports he is on Abilify and Remeron and does not believe either are working. Pt is also on Seroquel, Effexor and a mood stabilizer maybe Celexa. Pt reports increased depression over the past few weeks which has been getting worse until he had thoughts of suicide no plan. Pt doesn't feel like living anymore, no one cares about him. Pt reports he is also having visual hallucination of dust everywhere in different colors. Pt reports he has never had hallucinations before. Pt denies AOD    Gender  [x] Male [] Female [] Transgender  [] Other    Sexual Orientation    [x] Heterosexual [] Homosexual [] Bisexual [] Other    Suicidal Behavioral: CSSR-S Complete. [x] Reports:    [] Past [x] Present   [] Denies    Homicidal/ Violent Behavior  [] Reports:   [] Past [] Present   [x] Denies     Hallucinations/Delusions   [x] Reports:   [] Denies     Substance Use/Alcohol Use/Addiction: SBIRT Screen Complete.    [] Reports:   [x] Denies     Trauma History  [] Reports:  [x] Denies     Collateral Information:     No collateral information obtained at this time    Level of Care/Disposition Plan  [] Home:   [] Outpatient Provider:   [] Crisis Unit:   [x] Inpatient Psychiatric Unit:  [] Other:          PACHECO Cherry, SHIKHA  03/13/21 7366

## 2021-03-13 NOTE — ED NOTES
Pt has been accepted by Dr. Ladan Aleman    Pt will go to [address] [place] [address] [phone]    UT. Jessica Servin to arrange transport         PACHECO Huerta, [place]  03/13/21 8151

## 2021-03-13 NOTE — ED NOTES
Gave report to [facility], RN at [facility]. Physician ambulance ETA is 2-3 hours.       Dionne Contreras RN  03/13/21 8926

## 2021-03-13 NOTE — ED NOTES
CLAUDE Sims from [facility] called and performed telehealth visit with patient. She they do have a bed, and will get into contact with the physician.       Bereket Thompson RN  03/13/21 4752

## 2021-03-13 NOTE — ED NOTES
Patient eating boxed lunch at this time. All needs are met. CO at bedside. Patient calm and cooperative. Will continue to monitor.       Waleska Gutierrez RN  03/12/21 8179

## 2021-03-13 NOTE — ED NOTES
Patient resting in bed with eyes closed. No signs of distress noted. LEXII Rucker at bedside. Call out to social work for telehealth assessment. Will continue to monitor.        Gabi Contreras RN  03/12/21 2035

## 2021-03-13 NOTE — ED NOTES
Patient resting in bed comfortably. No signs of distress noted. Patient is calm and cooperative. CO at bedside. All needs are met at this time. Will continue to monitor.       Rodney Hopper RN  03/12/21 4472

## 2021-03-13 NOTE — ED NOTES
Attempted to call  at [facility] again at this time. No answer, left a voicemail.       Hamida Salguero RN  03/12/21 6030

## 2021-03-13 NOTE — GROUP NOTE
Group Therapy Note    Date: 3/13/2021    Group Start Time: 0115  Group End Time: 0200  Group Topic: Cognitive Skills    [facility] ACUTE BH 1    PACHECO Garcia, [facility]    Number of participants:14  Type of group: Cognitive Skills  Mode of intervention: Education, Support, Socialization, Exploration, Clarifying, and Problem-solving  Topic: Cognitive Skills  Objective: To improve communication skills    Group Therapy Note            Notes:  Pt was an active participant in cognitive skills group focusing on communication skills and improving interpersonal relationships. Pt was able to share personal information and provide supportive feedback to group members. Status After Intervention:  Improved    Participation Level:  Active Listener and Interactive    Participation Quality: Appropriate, Attentive, Sharing and Supportive      Speech:  normal      Thought Process/Content: Logical      Affective Functioning: Congruent      Mood: euthymic      Level of consciousness:  Alert, Oriented x4 and Attentive      Response to Learning: Progressing to goal      Endings: None Reported    Modes of Intervention: Education, Support, Socialization, Exploration, Clarifying and Problem-solving      Discipline Responsible: /Counselor      Signature:  PACHECO Garcia, [place]

## 2021-03-13 NOTE — ED NOTES
Paperwork and belongings given to [facility] ambulance crew and verbal report given. Pt loaded to cot to be transported to [facility] psych.       James Hernandez, JUAN DANIEL  03/13/21 1829

## 2021-03-13 NOTE — GROUP NOTE
Group Therapy Note    Date: 3/13/2021    Group Start Time: 1000  Group End Time: 1050  Group Topic: Psychoeducation    [facility] AC[facility] 1    Akua Hoffman, CTRS        Group Therapy Note    Number of participants: 11  Type of group: Psychoeducation  Mode of intervention: Education, Support, Socialization, Exploration, Clarifying, Problem-solving, and Activity  Topic: Positive Attitude Ball  Objective: Pt will identify 1 way to maintain a positive attitude in recovery. Notes:  Pt was interactive during group sharing 1 way to maintain a positive attitude in recovery. Pt gave support and feedback to others. Status After Intervention:  Improved    Participation Level:  Active Listener and Interactive    Participation Quality: Appropriate, Attentive, Sharing and Supportive      Speech:  normal      Thought Process/Content: Logical      Affective Functioning: Congruent      Mood: euthymic      Level of consciousness:  Alert, Oriented x4 and Attentive      Response to Learning: Able to verbalize current knowledge/experience, Able to verbalize/acknowledge new learning, Able to retain information and Capable of insight      Endings: None Reported    Modes of Intervention: Education, Support, Socialization, Exploration, Clarifying, Problem-solving and Activity
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
## 2021-03-13 NOTE — ED NOTES
ED Physician to call Dr. Clyde Noel for admission @ [phone]. If accepted ED Physician to document in notes that Pt was accepted.     Front and back of completed pink slip needs faxed to [phone]    [facility]FFILI[facility] OF [facility]  will arrange bed assignment after pink slip is received    Once pink slip is received,  will notify sending facility of bed arrangement, [facility] to be called to [phone], transport to be arranged by sending facility     PACHECO Duval, [place]  03/13/21 2294

## 2021-03-14 LAB
CHOLESTEROL, TOTAL: 100 MG/DL (ref 0–199)
HBA1C MFR BLD: 5 % (ref 4–5.6)
HDLC SERPL-MCNC: 29 MG/DL
LDL CHOLESTEROL CALCULATED: 58 MG/DL (ref 0–99)
TRIGL SERPL-MCNC: 64 MG/DL (ref 0–149)
VLDLC SERPL CALC-MCNC: 13 MG/DL

## 2021-03-14 PROCEDURE — 83036 HEMOGLOBIN GLYCOSYLATED A1C: CPT

## 2021-03-14 PROCEDURE — 6370000000 HC RX 637 (ALT 250 FOR IP): Performed by: INTERNAL MEDICINE

## 2021-03-14 PROCEDURE — 80061 LIPID PANEL: CPT

## 2021-03-14 PROCEDURE — 99231 SBSQ HOSP IP/OBS SF/LOW 25: CPT | Performed by: PSYCHIATRY & NEUROLOGY

## 2021-03-14 PROCEDURE — 6370000000 HC RX 637 (ALT 250 FOR IP): Performed by: PSYCHIATRY & NEUROLOGY

## 2021-03-14 PROCEDURE — 36415 COLL VENOUS BLD VENIPUNCTURE: CPT

## 2021-03-14 PROCEDURE — 6370000000 HC RX 637 (ALT 250 FOR IP): Performed by: NURSE PRACTITIONER

## 2021-03-14 PROCEDURE — 1240000000 HC EMOTIONAL WELLNESS R&B

## 2021-03-14 RX ORDER — ONDANSETRON 4 MG/1
4 TABLET, ORALLY DISINTEGRATING ORAL EVERY 8 HOURS PRN
Status: DISCONTINUED | OUTPATIENT
Start: 2021-03-14 | End: 2021-03-18 | Stop reason: HOSPADM

## 2021-03-14 RX ORDER — PERMETHRIN 50 MG/G
CREAM TOPICAL ONCE
Status: COMPLETED | OUTPATIENT
Start: 2021-03-14 | End: 2021-03-14

## 2021-03-14 RX ORDER — KETOCONAZOLE 20 MG/ML
SHAMPOO TOPICAL SEE ADMIN INSTRUCTIONS
Status: DISCONTINUED | OUTPATIENT
Start: 2021-03-14 | End: 2021-03-18 | Stop reason: HOSPADM

## 2021-03-14 RX ADMIN — PERMETHRIN CREAM 5% W/W: 50 CREAM TOPICAL at 16:37

## 2021-03-14 RX ADMIN — ARIPIPRAZOLE 30 MG: 15 TABLET ORAL at 09:44

## 2021-03-14 RX ADMIN — HYDROXYZINE PAMOATE 50 MG: 50 CAPSULE ORAL at 16:36

## 2021-03-14 RX ADMIN — BUSPIRONE HYDROCHLORIDE 30 MG: 10 TABLET ORAL at 20:53

## 2021-03-14 RX ADMIN — MIRTAZAPINE 45 MG: 15 TABLET, FILM COATED ORAL at 20:53

## 2021-03-14 RX ADMIN — BUSPIRONE HYDROCHLORIDE 30 MG: 10 TABLET ORAL at 09:44

## 2021-03-14 RX ADMIN — ACETAMINOPHEN 650 MG: 325 TABLET, FILM COATED ORAL at 10:55

## 2021-03-14 ASSESSMENT — SLEEP AND FATIGUE QUESTIONNAIRES
DIFFICULTY FALLING ASLEEP: NO
AVERAGE NUMBER OF SLEEP HOURS: 8

## 2021-03-14 ASSESSMENT — PATIENT HEALTH QUESTIONNAIRE - PHQ9: SUM OF ALL RESPONSES TO PHQ QUESTIONS 1-9: 18

## 2021-03-14 ASSESSMENT — LIFESTYLE VARIABLES: HISTORY_ALCOHOL_USE: NO

## 2021-03-14 NOTE — PROGRESS NOTES
Patient was in his room yelling, swearing, and slamming doors repeatedly. He was also throwing water in his room. Asked patient what was wrong and he yelled at staff \"It's nobody's fucking business. \" Explained to patient that he cannot slam doors and throw things. IM Haldol given in left arm. See MAR. Patient tolerated well. Will continue to monitor and offer support.         Electronically signed by Erik Choudhary RN on 3/13/2021 at 9:51 PM

## 2021-03-14 NOTE — PROGRESS NOTES
BEHAVIORAL HEALTH FOLLOW-UP NOTE     3/14/2021  THE PATIENT WAS SEEN THROUGH TELEPSYCHIATRY, WITH THE PATIENT IN [facility] [address] THE PROVIDER IN [place], [place]   Patient was seen and examined in person, Chart reviewed   Patient's case discussed with staff/team    Chief Complaint: depressed mood, suicidal ideation, auditory and visual hallucinations    Interim History:     Patient complained of having nausea, headaches and dizziness \"every time I eat or drink something\". He said this had started [place] couple of days before admission\" and he said he had not experienced these symptoms before. He otherwise said his sleep was 'good', and that he was able to eat. He said his mood was \"so-so\" today; he said he \"had a little tantrum yesterday\", and that he was \"getting really aggravated\". He said the suicidal ideation \"started to go away\". He said he actually does not really want to die, but rather \"wants his mental health problems and pain to go away\". He said he does not believe in himself enough, and that he tends to think badly of himself. He is still depressed, but 'not as bad'. He denied homicidal ideation. He denied having auditory hallucinations. He still c/o visual hallucinations, as \"colors\"; for instance he said he was seeing the RN's hair having red coloring in it. He is still paranoid; he talked about the \"[employer] conspiracy theory\"- the federal government supposedly wanting to turn the [address] [place] [address] into 10 [employer] territories.    Appetite:   [x] Normal/Unchanged  [] Increased  [] Decreased      Sleep:       [x] Normal/Unchanged  [] Fair       [] Poor              Energy:    [] Normal/Unchanged  [] Increased  [x] Decreased        SI [x] Present passive [] Absent    HI  []Present  [x] Absent     Aggression:  [] yes  [x] no    Patient is [] able  [x] unable to CONTRACT FOR SAFETY     PAST MEDICAL/PSYCHIATRIC HISTORY:   Past Medical History:   Diagnosis Date    ADD (attention deficit disorder with hyperactivity)     Anxiety     Cerebral hemorrhage (HCC)     Cerebral palsy (HCC)     Depression        FAMILY/SOCIAL HISTORY:  No family history on file. Social History     Socioeconomic History    Marital status: Single     Spouse name: Not on file    Number of children: 0    Years of education: 15    Highest education level: Not on file   Occupational History    Occupation: STUDENT     Comment: 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   Social Needs    Financial resource strain: Not on file    Food insecurity     Worry: Not on file     Inability: Not on file   [employer] needs     Medical: Not on file     Non-medical: Not on file   Tobacco Use    Smoking status: Current Every Day Smoker     Packs/day: 1.00     Types: Cigarettes    Smokeless tobacco: Former User   Substance and Sexual Activity    Alcohol use: No    Drug use: Yes     Types: Marijuana    Sexual activity: Never   Lifestyle    Physical activity     Days per week: Not on file     Minutes per session: Not on file    Stress: Not on file   Relationships    Social connections     Talks on phone: Not on file     Gets together: Not on file     Attends Christianity service: Not on file     Active member of club or organization: Not on file     Attends meetings of clubs or organizations: Not on file     Relationship status: Not on file    Intimate partner violence     Fear of current or ex partner: Not on file     Emotionally abused: Not on file     Physically abused: Not on file     Forced sexual activity: Not on file   Other Topics Concern    Not on file   Social History Narrative    Not on file           ROS:  [x] All negative/unchanged except if checked.  Explain positive(checked items) below:  [] Constitutional  [] Eyes  [] Ear/Nose/Mouth/Throat  [] Respiratory  [] CV  [] GI  []   [] Musculoskeletal  [] Skin/Breast  [] Neurological  [] Endocrine  [] Heme/Lymph  [] Allergic/Immunologic    Explanation:     MEDICATIONS:    Current Facility-Administered Medications:     acetaminophen (TYLENOL) tablet 650 mg, 650 mg, Oral, Q6H PRN, ELAINE Chinchilla - CNP, 650 mg at 03/14/21 1055    magnesium hydroxide (MILK OF MAGNESIA) 400 MG/5ML suspension 30 mL, 30 mL, Oral, Daily PRN, ELAINE Chinchilla - CNP    aluminum & magnesium hydroxide-simethicone (MAALOX) 200-200-20 MG/5ML suspension 30 mL, 30 mL, Oral, PRN, ELAINE Chinchilla - CNP    hydrOXYzine (VISTARIL) capsule 50 mg, 50 mg, Oral, TID PRN, ELAINE Chinchilla - CNP, 50 mg at 03/13/21 2314    haloperidol lactate (HALDOL) injection 5 mg, 5 mg, Intramuscular, Q6H PRN, 5 mg at 03/13/21 2146 **OR** haloperidol (HALDOL) tablet 5 mg, 5 mg, Oral, Q6H PRN, ELAINE Chinchilla - CNP    traZODone (DESYREL) tablet 50 mg, 50 mg, Oral, Nightly PRN, Cecile Maxwell APRBIB - CNP    loperamide (IMODIUM) capsule 2 mg, 2 mg, Oral, 4x Daily PRN, ELAINE Chinchilla - CNP, 2 mg at 03/13/21 0908    ARIPiprazole (ABILIFY) tablet 30 mg, 30 mg, Oral, Daily, Lizz Whitmore MD, 30 mg at 03/14/21 0944    mirtazapine (REMERON) tablet 45 mg, 45 mg, Oral, Nightly, Lizz Whitmore MD, 45 mg at 03/13/21 2314    busPIRone (BUSPAR) tablet 30 mg, 30 mg, Oral, BID, Lizz Whitmore MD, 30 mg at 03/14/21 0944      Examination:  /72   Pulse 67   Temp 98.1 °F (36.7 °C)   Resp 14   Ht 5' 8\" (1.727 m)   Wt 150 lb (68 kg)   SpO2 99%   BMI 22.81 kg/m²   Gait - steady  Medication side effects(SE): none reported    Mental Status Examination:    Level of consciousness:  within normal limits   Appearance:  fair grooming and fair hygiene  Behavior/Motor:  psychomotor retardation  Attitude toward examiner:  cooperative and fair eye contact  Speech:  spontaneous, normal rate and normal volume   Mood: anxious, decreased range and depressed  Affect:  mood congruent and blunted  Thought processes:  linear, goal directed and coherent   Thought content:  Homocidal ideation denies  Suicidal Ideation:  denies suicidal ideation  Delusions:  paranoid  Perceptual Disturbance:  Visual hallucinations  Cognition:  oriented to person, place, and time   Concentration distractible  Insight limited   Judgement limited     ASSESSMENT:   Patient symptoms are:  [x] Well controlled  [] Improving  [] Worsening  [] No change      Diagnosis:   Principal Problem:    Bipolar I disorder, most recent episode depressed, severe with psychotic features ([facility]  Active Problems:    Cannabis use disorder, moderate, dependence ([facility]  Resolved Problems:    * No resolved hospital problems. *      LABS:    Recent Labs     03/12/21  1032   WBC 9.5   HGB 16.2        Recent Labs     03/12/21  1032      K 3.9      CO2 29   BUN 6   CREATININE 0.9   GLUCOSE 109*     Recent Labs     03/12/21  1032   BILITOT 0.3   ALKPHOS 68   AST 32   ALT 23     Lab Results   Component Value Date    LABAMPH SEE BELOW 03/12/2021    LABAMPH NOT DETECTED 03/12/2021    LABAMPH NOT DETECTED 06/25/2012    BARBSCNU SEE BELOW 03/12/2021    BARBSCNU NOT DETECTED 03/12/2021    LABBENZ SEE BELOW 03/12/2021    LABBENZ NOT DETECTED 03/12/2021    LABBENZ SEE BELOW 04/28/2014    CANNAB POSITIVE  06/25/2012    LABMETH SEE BELOW 03/12/2021    LABMETH NOT DETECTED 03/12/2021    OPIATESCREENURINE SEE BELOW 03/12/2021    OPIATESCREENURINE NOT DETECTED 03/12/2021    PHENCYCLIDINESCREENURINE SEE BELOW 03/12/2021    PHENCYCLIDINESCREENURINE NOT DETECTED 03/12/2021    ETOH <10 03/12/2021     Lab Results   Component Value Date    TSH 1.400 11/18/2019     Lab Results   Component Value Date    LITHIUM 0.19 (L) 06/30/2014     Lab Results   Component Value Date    VALPROATE 86 04/28/2014       RISK ASSESSMENT:   Suicide risk: high  Homicide risk: low  Violence risk: low  Elopement risk: low    Treatment Plan:  Reviewed current Medications with the patient. Will continue current medications  Risks, benefits, side effects, drug-to-drug interactions and alternatives to treatment were discussed. Collateral information: pending  CD evaluation pending  Encourage patient to attend group and other milieu activities.   Discharge planning discussed with the patient and treatment team.    PSYCHOTHERAPY/COUNSELING:  [x] Therapeutic interview  [x] Supportive  [] CBT  [] Ongoing  [] Other    [x] Patient continues to need, on a daily basis, active treatment furnished directly by or requiring the supervision of inpatient psychiatric personnel      Anticipated Length of stay: 5-7 days depending on stability            Electronically signed by Magaly Aguilar MD on 3/14/2021 at 11:34 AM

## 2021-03-14 NOTE — GROUP NOTE
Group Therapy Note    Date: 3/14/2021    Group Start Time: 1400  Group End Time: 80  Group Topic: Cognitive Skills    [facility] ACUTE [facility] 1    PACHECO Jaramillo, [facility]    Number of participants:14  Type of group: Cognitive Skills  Mode of intervention: Education, Support, Socialization, Exploration, Clarifying, and Problem-solving  Topic: Cognitive Skills  Objective:  Improve Communication    Group Therapy Note         Notes:  Pt was an active participant in cognitive skills group focusing on communication skills and improving interpersonal boundaries. Pt was able to share personal information and provide supportive feedback to group members. Status After Intervention:  Improved    Participation Level:  Active Listener and Interactive    Participation Quality: Appropriate and Attentive      Speech:  normal    Thought Process/Content: Logical      Affective Functioning: Congruent      Mood: euthymic      Level of consciousness:  Alert and Oriented x4      Response to Learning: Able to verbalize current knowledge/experience, Able to verbalize/acknowledge new learning, Capable of insight and Progressing to goal      Endings: None Reported    Modes of Intervention: Education, Support, Socialization, Exploration, Clarifying and Problem-solving      Discipline Responsible: /Counselor      Signature:  PACHECO Navarro, [place]

## 2021-03-14 NOTE — PLAN OF CARE
Patient presents isolative. He remains with paranoid delusions and believes that the government is going to put him in [place] [address] concentration camps due to [place], and he states that he saw on the internet large plastic containers in [facility] which he contributes to being caskets. Patient also believes that the government is dividing the [facility] into \"10 [employer] sectors. \" He believes each of these which have caused his anxiety to worsen. Patient also admits to depression and states that he can only make other people feel good and not himself. Patient is a poor historian, accompanied with poor eye contact, and minimal judgement. Patient gives different answers to assessment questions and becomes frustrated when asked to clarify. Patient eye contact is poor. Patient denies suicidal thoughts and homicidal thoughts. However, patient does admit to visual hallucinations of \"colors. \" He states \"I see red in his hair and the wall has white specks in it, and I just see different colors in things and I can't tell if it is there or not. \" Patient denies intent to harm self. Patient judgement is poor, speech is pressured at times. He reports to poor attention and concentration due to racing thoughts. Patient remains circumstantial. Patient does present guarded at times and will ignore certain assessment questions. No signs of self harm. Will monitor closely.

## 2021-03-14 NOTE — PROGRESS NOTES
[facility]  Initial Interdisciplinary Treatment Plan NOTE    Review Date & Time: 3/14/2021 1030    Patient was not in treatment team    Admission Type:   Admission Type: Involuntary    Reason for admission:  Reason for Admission: \"Basically having a lot of mental health problems ontop of physical, depression, anxiety, paranoia, mood swings, and believe me that happens a lot, and I just don't know what to do. \"      Estimated Length of Stay Update:  4-7 days   Estimated Discharge Date Update: 3/20/21    PATIENT STRENGTHS:  Patient Strengths Strengths: Communication, No significant Physical Illness, Medication Compliance, Connection to output provider  Patient Strengths and Limitations:Limitations: Limited education -> difficulty reading or writing, Difficulty problem solving/relies on others to help solve problems  Addictive Behavior:Addictive Behavior  In the past 3 months, have you felt or has someone told you that you have a problem with:  : None  Do you have a history of Chemical Use?: No  Do you have a history of Alcohol Use?: No  Do you have a history of Street Drug Abuse?: No  Histroy of Prescripton Drug Abuse?: No  Medical Problems:  Past Medical History:   Diagnosis Date    ADD (attention deficit disorder with hyperactivity)     Anxiety     Cerebral hemorrhage (HCC)     Cerebral palsy ([facility]     Depression        EDUCATION:   Learner Progress Toward Treatment Goals: Reviewed results and recommendations of this team, Reviewed group plan and strategies, Reviewed signs, symptoms and risk of self harm and violent behavior and Reviewed goals and plan of care    Method: Small group    Outcome: Demonstrated Understanding    PATIENT GOALS: n/a - no goal given    PLAN/TREATMENT RECOMMENDATIONS UPDATE: encourage interaction with peers, maintain therapeutic environment, monitor for medical issues    GOALS UPDATE:   Time frame for Short-Term Goals: n/a - no goal given      Silverio Bocanegra RN
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
## 2021-03-14 NOTE — PLAN OF CARE
Denies SI, HI, and hallucinations. Patient states \"I feel in the middle. I'm not depressed but not happy. \" He reports that he was treating with Rubbie Soulier and thinks that his medications were not right which caused him to hear voices. Patient did not verbalize any delusional thinking this evening. Speech is pressured. He has been out on the unit and is social with peers. He has complaints of 5/10 pain from a headache. PRN Tylenol given. See MAR. No other complaints or concerns verbalized at this time. Will continue to offer support.         Problem: Suicide risk  Goal: Provide patient with safe environment  Description: Provide patient with safe environment  Outcome: Met This Shift     Problem: Altered Mood, Depressive Behavior:  Goal: Ability to disclose and discuss suicidal ideas will improve  Description: Ability to disclose and discuss suicidal ideas will improve  Outcome: Met This Shift     Problem: Altered Mood, Deterioration in Function:  Goal: Able to verbalize reality based thinking  Description: Able to verbalize reality based thinking  Outcome: Met This Shift     Problem: Pain:  Goal: Control of acute pain  Description: Control of acute pain  Outcome: Met This Shift     Problem: Altered Mood, Depressive Behavior:  Goal: Able to verbalize and/or display a decrease in depressive symptoms  Description: Able to verbalize and/or display a decrease in depressive symptoms  Outcome: Not Met This Shift     Problem: Depressive Behavior With or Without Suicide Precautions:  Goal: Able to verbalize acceptance of life and situations over which he or she has no control  Description: Able to verbalize acceptance of life and situations over which he or she has no control  Outcome: Not Met This Shift     Problem: Altered Mood, Manic Behavior:  Goal: Able to verbalize decrease in frequency and intensity of racing thoughts  Description: Able to verbalize decrease in frequency and intensity of racing thoughts  Outcome: Not Met This Shift         Electronically signed by Berenda Sandifer, RN on 3/13/2021 at 8:21 PM

## 2021-03-14 NOTE — CONSULTS
[facility]  Internal Medicine Residency Program  Consult note- Internal Medicine    Patient:  Nabeel Lara III 27 y.o. male MRN: [id number]     Date of Service: 3/14/2021    Hospital Day: 2      Chief complaint: Nausea, generalized itching    History of Present Illness   The patient is a 27 y.o. male with a past medical history of bipolar disorder, anxiety, depression initially admitted on 3/30/2021 because of suicidal ideation. He stated that he has not attempted suicide recently but he had overdosed on Seroquel last year. He stated that he has been angry with his father because his father was not very supportive. He stated that he feels nauseous, especially after meal.  He also reports dizziness after urination. He stated that he has been having generalized itching starting from the back of the neck scalp and then bilateral upper extremities and lower extremities for the last 3 days. He denies any hiking, insect bite, traveling outside, sick contact, chest pain, vomiting, falls, dysuria, back pain. He stated that he has itching in between the finger webs as well. Internal medicine was consulted for medical management of nausea, dizziness, generalized itching. Past Medical History:      Diagnosis Date    ADD (attention deficit disorder with hyperactivity)     Anxiety     Cerebral hemorrhage (HCC)     Cerebral palsy (HCC)     Depression        Past Surgical History:        Procedure Laterality Date    FOOT SURGERY      HERNIA REPAIR         Medications Prior to Admission:    Prior to Admission medications    Medication Sig Start Date End Date Taking?  Authorizing Provider   ibuprofen (ADVIL;MOTRIN) 800 MG tablet Take 1 tablet by mouth 2 times daily as needed for Pain 9/18/20 9/23/20  ELAINE Vivas - CNP   busPIRone (BUSPAR) 5 MG tablet Take 5 mg by mouth 2 times daily    Historical Provider, MD   QUEtiapine (SEROQUEL XR) 300 MG extended release tablet Take 100 mg by mouth nightly     Historical Provider, MD       Allergies:  Patient has no known allergies. Social History:   TOBACCO:   reports that he has been smoking cigarettes. He has been smoking about 1.00 pack per day. He has quit using smokeless tobacco.  ETOH:   reports no history of alcohol use. Family History:   No family history on file. REVIEW OF SYSTEMS:    · Constitutional: Generalized itching, no fever, no chills, no change in weight; good appetite  · HEENT: No blurred vision, no ear problems, no sore throat, no rhinorrhea. · Respiratory: No cough, no sputum production, no pleuritic chest pain, no shortness of breath  · Cardiology: No angina, no dyspnea on exertion, no paroxysmal nocturnal dyspnea, no orthopnea, no palpitation, no leg swelling. · Gastroenterology: Nausea, no dysphagia, no reflux; no abdominal pain, no vomiting; no constipation or diarrhea.  No hematochezia   · Genitourinary: No dysuria, no frequency, hesitancy; no hematuria  · Musculoskeletal: no joint pain, no myalgia, no change in range of movement  · Neurology: no focal weakness in extremities, no slurred speech, no double vision, no tingling or numbness sensation  · Endocrinology: no temperature intolerance, no polyphagia, polydipsia or polyuria  · Hematology: no increased bleeding, no bruising, no lymphadenopathy  · Skin: no skin changes noticed by patient  · Psychology: no depressed mood, no suicidal ideation    Physical Exam   · Vitals: /72   Pulse 67   Temp 98.1 °F (36.7 °C)   Resp 14   Ht 5' 8\" (1.727 m)   Wt 150 lb (68 kg)   SpO2 99%   BMI 22.81 kg/m²     General: alert, awake, in no acute distress  HEENT: NC, AT, moist oral mucosa  Neck: supple  Pulmonary: clear to auscultation bilaterally, no wheezing or crackles  CV: RRR, S1 S2 heard, no MRG  Abd: soft, nontender, nondistended, BS +  Ext: no pedal edema  Neuro: Alert, awake, no gross focal neurological deficit  Skin: Redness, itch worse, multiple rashes noted over the bilateral upper extremities, red area noted behind the scalp and upper part of the neck, itchy marks in the bilateral lower extremities as well, redness of the finger web spaces noted. Labs and Imaging Studies   Basic Labs  Recent Labs     03/12/21  1032      K 3.9      CO2 29   BUN 6   CREATININE 0.9   GLUCOSE 109*   CALCIUM 9.0       Recent Labs     03/12/21  1032   WBC 9.5   RBC 5.25   HGB 16.2   HCT 48.6   MCV 92.6   MCH 30.9   MCHC 33.3   RDW 12.4      MPV 10.8       CBC:   Lab Results   Component Value Date    WBC 9.5 03/12/2021    RBC 5.25 03/12/2021    HGB 16.2 03/12/2021    HCT 48.6 03/12/2021    MCV 92.6 03/12/2021    RDW 12.4 03/12/2021     03/12/2021     CMP:  Lab Results   Component Value Date     03/12/2021    K 3.9 03/12/2021     03/12/2021    CO2 29 03/12/2021    BUN 6 03/12/2021    PROT 6.5 03/12/2021       Imaging Studies:     Xr Chest Portable    Result Date: 3/12/2021  EXAMINATION: ONE XRAY VIEW OF THE CHEST 3/12/2021 10:33 am COMPARISON: 09/23/2005 HISTORY: ORDERING SYSTEM PROVIDED HISTORY: shorntess of breath TECHNOLOGIST PROVIDED HISTORY: Reason for exam:->shorntess of breath FINDINGS: The cardiomediastinal silhouette is unremarkable. No infiltrate, effusion, or pneumothorax is seen. No acute osseous abnormality. No radiographic evidence of acute abnormality     Resident's Assessment and Plan     Rubenina Smith III is a 27 y.o. male with a past medical history of bipolar disorder, anxiety, depression, admitted for suicidal ideation. Internal medicine was consulted for medical management of nausea, dizziness, generalized itching    Assessment:  1. Nausea; likely secondary to medication side effect vs gastritis B: Denies any heartburn, metallic taste in mouth  2. Dizziness, likely orthostatic secondary to volume depletion vs medication side effect as he is on multiple antipsychotic medication  3.  Generalized itching likely secondary to scabies vs insect bites  4. Scalp itching likely secondary to seborrheic dermatitis   5. Bipolar disorder; on aripiprazole, buspirone, hydroxyzine, mirtazapine, trazodone, haloperidol as needed per psychiatry  6.  Suicidal ideation    Plan:  · We will start ondansetron every 8 hours as needed for nausea; as she is on multiple antipsychotic medication will get an EKG in 2 days to look for any QT prolongation  · We will check BMP  · We will order permethrin cream topically to be applied to the entire body and keep for 8 to 14 hours before washing for scabies  · If symptoms does not improve, will consider giving ivermectin  · Ketoconazole shampoo every 3 to 4 days  · Continue hydroxyzine 50 mg 3 times daily as needed for itching  · Will check orthostatic vitals; if positive will give 500 mL normal saline bolus  · We will check BMP tomorrow  · Fall/aspiration precaution    PT/OT evaluation: Not indicated at this time  Disposition: Continue current care    Mary Martinez MD, PGY-2  Attending physician: Dr. Leonora Rashid

## 2021-03-15 LAB
ANION GAP SERPL CALCULATED.3IONS-SCNC: 8 MMOL/L (ref 7–16)
BUN BLDV-MCNC: 13 MG/DL (ref 6–20)
CALCIUM SERPL-MCNC: 9.5 MG/DL (ref 8.6–10.2)
CHLORIDE BLD-SCNC: 103 MMOL/L (ref 98–107)
CO2: 29 MMOL/L (ref 22–29)
CREAT SERPL-MCNC: 1 MG/DL (ref 0.7–1.2)
EKG ATRIAL RATE: 77 BPM
EKG P AXIS: 74 DEGREES
EKG P-R INTERVAL: 154 MS
EKG Q-T INTERVAL: 374 MS
EKG QRS DURATION: 96 MS
EKG QTC CALCULATION (BAZETT): 423 MS
EKG R AXIS: 93 DEGREES
EKG T AXIS: 66 DEGREES
EKG VENTRICULAR RATE: 77 BPM
GFR AFRICAN AMERICAN: >60
GFR NON-AFRICAN AMERICAN: >60 ML/MIN/1.73
GLUCOSE BLD-MCNC: 87 MG/DL (ref 74–99)
POTASSIUM REFLEX MAGNESIUM: 4.3 MMOL/L (ref 3.5–5)
SODIUM BLD-SCNC: 140 MMOL/L (ref 132–146)

## 2021-03-15 PROCEDURE — 99232 SBSQ HOSP IP/OBS MODERATE 35: CPT | Performed by: NURSE PRACTITIONER

## 2021-03-15 PROCEDURE — 6370000000 HC RX 637 (ALT 250 FOR IP): Performed by: NURSE PRACTITIONER

## 2021-03-15 PROCEDURE — 1240000000 HC EMOTIONAL WELLNESS R&B

## 2021-03-15 PROCEDURE — 6370000000 HC RX 637 (ALT 250 FOR IP): Performed by: PSYCHIATRY & NEUROLOGY

## 2021-03-15 PROCEDURE — 80048 BASIC METABOLIC PNL TOTAL CA: CPT

## 2021-03-15 PROCEDURE — 99254 IP/OBS CNSLTJ NEW/EST MOD 60: CPT | Performed by: INTERNAL MEDICINE

## 2021-03-15 PROCEDURE — 36415 COLL VENOUS BLD VENIPUNCTURE: CPT

## 2021-03-15 RX ADMIN — TRAZODONE HYDROCHLORIDE 50 MG: 50 TABLET ORAL at 20:21

## 2021-03-15 RX ADMIN — BUSPIRONE HYDROCHLORIDE 30 MG: 10 TABLET ORAL at 20:21

## 2021-03-15 RX ADMIN — ARIPIPRAZOLE 30 MG: 15 TABLET ORAL at 08:57

## 2021-03-15 RX ADMIN — BUSPIRONE HYDROCHLORIDE 30 MG: 10 TABLET ORAL at 08:57

## 2021-03-15 RX ADMIN — MIRTAZAPINE 45 MG: 15 TABLET, FILM COATED ORAL at 20:21

## 2021-03-15 RX ADMIN — HYDROXYZINE PAMOATE 50 MG: 50 CAPSULE ORAL at 10:36

## 2021-03-15 RX ADMIN — ACETAMINOPHEN 650 MG: 325 TABLET, FILM COATED ORAL at 17:26

## 2021-03-15 ASSESSMENT — PAIN SCALES - GENERAL: PAINLEVEL_OUTOF10: 4

## 2021-03-15 NOTE — PROGRESS NOTES
FAMILY/SOCIAL HISTORY:  No family history on file. Social History     Socioeconomic History    Marital status: Single     Spouse name: Not on file    Number of children: 0    Years of education: 15    Highest education level: Not on file   Occupational History    Occupation: STUDENT     Comment: 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   Social Needs    Financial resource strain: Not on file    Food insecurity     Worry: Not on file     Inability: Not on file   [employer] needs     Medical: Not on file     Non-medical: Not on file   Tobacco Use    Smoking status: Current Every Day Smoker     Packs/day: 1.00     Types: Cigarettes    Smokeless tobacco: Former User   Substance and Sexual Activity    Alcohol use: No    Drug use: Yes     Types: Marijuana    Sexual activity: Never   Lifestyle    Physical activity     Days per week: Not on file     Minutes per session: Not on file    Stress: Not on file   Relationships    Social connections     Talks on phone: Not on file     Gets together: Not on file     Attends Anabaptist service: Not on file     Active member of club or organization: Not on file     Attends meetings of clubs or organizations: Not on file     Relationship status: Not on file    Intimate partner violence     Fear of current or ex partner: Not on file     Emotionally abused: Not on file     Physically abused: Not on file     Forced sexual activity: Not on file   Other Topics Concern    Not on file   Social History Narrative    Not on file           ROS:  [x] All negative/unchanged except if checked.  Explain positive(checked items) below:  [] Constitutional  [] Eyes  [] Ear/Nose/Mouth/Throat  [] Respiratory  [] CV  [] GI  []   [] Musculoskeletal  [] Skin/Breast  [] Neurological  [] Endocrine  [] Heme/Lymph  [] Allergic/Immunologic    Explanation:     MEDICATIONS:    Current Facility-Administered Medications:     ondansetron (ZOFRAN-ODT) disintegrating tablet 4 mg, 4 mg, Oral, Q8H PRN, Annemarie Rangel MD    ketoconazole (NIZORAL) 2 % shampoo, , Topical, See Admin Instructions, Annemarie Rangel MD    acetaminophen (TYLENOL) tablet 650 mg, 650 mg, Oral, Q6H PRN, [facility] Shires, APRN - CNP, 650 mg at 03/14/21 1055    magnesium hydroxide (MILK OF MAGNESIA) 400 MG/5ML suspension 30 mL, 30 mL, Oral, Daily PRN, Adan Shires, APRN - CNP    aluminum & magnesium hydroxide-simethicone (MAALOX) 200-200-20 MG/5ML suspension 30 mL, 30 mL, Oral, PRN, Adanos Shires, APRN - CNP    hydrOXYzine (VISTARIL) capsule 50 mg, 50 mg, Oral, TID PRN, [facility] Shir, APRN - CNP, 50 mg at 03/15/21 1036    haloperidol lactate (HALDOL) injection 5 mg, 5 mg, Intramuscular, Q6H PRN, 5 mg at 03/13/21 2146 **OR** haloperidol (HALDOL) tablet 5 mg, 5 mg, Oral, Q6H PRN, Adan Henry, APRN - CNP    traZODone (DESYREL) tablet 50 mg, 50 mg, Oral, Nightly PRN, Adan Henry, APRN - CNP    loperamide (IMODIUM) capsule 2 mg, 2 mg, Oral, 4x Daily PRN, [facility] Shirstephanie, APRN - CNP, 2 mg at 03/13/21 0908    ARIPiprazole (ABILIFY) tablet 30 mg, 30 mg, Oral, Daily, Sofie Perez MD, 30 mg at 03/15/21 0857    mirtazapine (REMERON) tablet 45 mg, 45 mg, Oral, Nightly, Sofie Perez MD, 45 mg at 03/14/21 2053    busPIRone (BUSPAR) tablet 30 mg, 30 mg, Oral, BID, Sofie Perez MD, 30 mg at 03/15/21 1413      Examination:  BP (!) 141/83   Pulse 57   Temp 98.3 °F (36.8 °C) (Temporal)   Resp 16   Ht 5' 8\" (1.727 m)   Wt 150 lb (68 kg)   SpO2 99%   BMI 22.81 kg/m²   Gait - steadyMedication side effects(SE):    Mental Status Examination:    Level of consciousness:  within normal limits   Appearance:  fair grooming and fair hygiene  Behavior/Motor:  agitated  Attitude toward examiner:  cooperative  Speech:  normal rate and normal volume   Mood: anxious  Affect:  anxious  Thought processes:  incoherent   Thought content:  Delusions:  paranoid  Cognition:  oriented to person, place, and time   Concentration intact Insight poor   Judgement poor     ASSESSMENT:   Patient symptoms are:  [] Well controlled  [x] Improving  [] Worsening  [] No change      Diagnosis:  Bipolar 1 depressed with psychotic features F3 1.5      LABS:    No results for input(s): WBC, HGB, PLT in the last 72 hours. Recent Labs     03/15/21  0641      K 4.3      CO2 29   BUN 13   CREATININE 1.0   GLUCOSE 87     No results for input(s): BILITOT, ALKPHOS, AST, ALT in the last 72 hours. Lab Results   Component Value Date    LABAMPH SEE BELOW 03/12/2021    LABAMPH NOT DETECTED 03/12/2021    LABAMPH NOT DETECTED 06/25/2012    BARBSCNU SEE BELOW 03/12/2021    BARBSCNU NOT DETECTED 03/12/2021    LABBENZ SEE BELOW 03/12/2021    LABBENZ NOT DETECTED 03/12/2021    LABBENZ SEE BELOW 04/28/2014    CANNAB POSITIVE  06/25/2012    LABMETH SEE BELOW 03/12/2021    LABMETH NOT DETECTED 03/12/2021    OPIATESCREENURINE SEE BELOW 03/12/2021    OPIATESCREENURINE NOT DETECTED 03/12/2021    PHENCYCLIDINESCREENURINE SEE BELOW 03/12/2021    PHENCYCLIDINESCREENURINE NOT DETECTED 03/12/2021    ETOH <10 03/12/2021     Lab Results   Component Value Date    TSH 1.400 11/18/2019     Lab Results   Component Value Date    LITHIUM 0.19 (L) 06/30/2014     Lab Results   Component Value Date    VALPROATE 86 04/28/2014       RISK ASSESSMENT  Treatment Plan:  Reviewed current Medications with the patient. Risks, benefits, side effects, drug-to-drug interactions and alternatives to treatment were discussed. Collateral information:CD evaluation  Encourage patient to attend group and other milieu activities.   Discharge planning discussed with the patient and treatment team.    Continue Abilify 30 mg daily  Continue BuSpar 30 mg twice daily  Continue Remeron 45 mg at bedtime    PSYCHOTHERAPY/COUNSELING:  [x] Therapeutic interview  [x] Supportive  [] CBT  [] Ongoing  [] Other    [x] Patient continues to need, on a daily basis, active treatment furnished directly by or requiring the supervision of inpatient psychiatric personnel      Anticipated Length of stay:            Electronically signed by Tye Primrose on 3/15/2021 at 11:34 AM

## 2021-03-15 NOTE — PROGRESS NOTES
[facility]  Internal Medicine Residency Program  Progress Note - House Team 2    Patient:  Bessie Tilley III 27 y.o. male MRN: [id number]     Date of Service: 3/15/2021     CC: had no chief complaint listed for this encounter. Overnight events: nausea. Itching     Subjective     Patient was seen and examined today  Reports feeling better after the cream  No nausea, itching is getting better too    Objective     Physical Exam:  · Vitals: BP (!) 141/83   Pulse 57   Temp 98.3 °F (36.8 °C) (Temporal)   Resp 16   Ht 5' 8\" (1.727 m)   Wt 150 lb (68 kg)   SpO2 99%   BMI 22.81 kg/m²     · I & O - 24hr: No intake/output data recorded. · General Appearance: alert, appears stated age and cooperative  · HEENT:  Head: Normocephalic, no lesions, without obvious abnormality. · Neck: no adenopathy, no carotid bruit, no JVD, supple, symmetrical, trachea midline and thyroid not enlarged, symmetric, no tenderness/mass/nodules  · Lung: clear to auscultation bilaterally  · Heart: regular rate and rhythm, S1, S2 normal, no murmur, click, rub or gallop  · Abdomen: soft, non-tender; bowel sounds normal; no masses,  no organomegaly  · Extremities:  extremities normal, atraumatic, no cyanosis or edema  · Musculokeletal: No joint swelling, no muscle tenderness. ROM normal in all joints of extremities.    · Neurologic: Mental status: Alert, oriented, thought content appropriate  Subject  Pertinent Labs & Imaging Studies   katie  CBC with Differential:    Lab Results   Component Value Date    WBC 9.5 03/12/2021    RBC 5.25 03/12/2021    HGB 16.2 03/12/2021    HCT 48.6 03/12/2021     03/12/2021    MCV 92.6 03/12/2021    MCH 30.9 03/12/2021    MCHC 33.3 03/12/2021    RDW 12.4 03/12/2021    SEGSPCT 64 06/23/2012    LYMPHOPCT 22.3 03/12/2021    MONOPCT 4.7 03/12/2021    BASOPCT 0.3 03/12/2021    MONOSABS 0.45 03/12/2021    LYMPHSABS 2.12 03/12/2021    EOSABS 0.10 03/12/2021    BASOSABS 0.03 03/12/2021 CMP:    Lab Results   Component Value Date     03/15/2021    K 4.3 03/15/2021     03/15/2021    CO2 29 03/15/2021    BUN 13 03/15/2021    CREATININE 1.0 03/15/2021    GFRAA >60 03/15/2021    LABGLOM >60 03/15/2021    GLUCOSE 87 03/15/2021    PROT 6.5 03/12/2021    LABALBU 4.3 03/12/2021    CALCIUM 9.5 03/15/2021    BILITOT 0.3 03/12/2021    ALKPHOS 68 03/12/2021    AST 32 03/12/2021    ALT 23 03/12/2021       Resident's Assessment and Plan     Janie Carlos III is a 27 y.o. male    Assessment:  1. Nausea; likely secondary to medication side effect vs gastritis B: Denies any heartburn, metallic taste in mouth, resolved   2. Dizziness, orthostatic VS negative, 2/2 ? medication side effect as he is on multiple antipsychotic medication,improved   3. Generalized itching likely secondary to scabies vs insect bites, improved   4. Scalp itching likely secondary to seborrheic dermatitis   5. Bipolar disorder; on aripiprazole, buspirone, hydroxyzine, mirtazapine, trazodone, haloperidol as needed per psychiatry  6. Suicidal ideation     Plan:  · ondansetron every 8 hours as needed for nausea; as she is on multiple antipsychotic medication will get an EKG in 2 days to look for any QT prolongation  · Pending BMP  · permethrin cream topically to be applied to the entire body and keep for 8 to 14 hours before washing for scabies  · If symptoms does not improve, will consider giving ivermectin  · Ketoconazole shampoo every 3 to 4 days  · Continue hydroxyzine 50 mg 3 times daily as needed for itching  · Fall/aspiration precaution          PT/OT evaluation:  DVT prophylaxis/ GI prophylaxis:   Disposition: Kyara Ny MD, PGY-3  Attending physician: Dr. Zaida Singh  [facility]    Attending Physician Statement:  Gonzalez Carlson M.D., F.A.C.P. I have discussed the case, including pertinent history and exam findings with the resident/NP.  I have seen and examined the patient and the key elements of the encounter have been performed by me. I agree with the resident ROS, PMHx, PSHx, meds reviewed and assessment, plan and orders as documented by the resident/NP      Hospital charts reviewed, including other providers notes, relevant labs and imaging. intemittent ? Vs Chronic nausea- NOT complaining tdaoy - zofran helpful  Dizziness yesterday sp urination- orthosatics negative  THC noted --  is on multiple antipsychotic medication   QTc Calculation (Bazett) 423      ·  -New onset generallized--  · Web spacing and other areas of itchiness-- rule out -- permethrin cream topically to be applied to the entire body and keep for 8 to 14 hours before washing for scabies    >50% of time spent coordinating care with other providers and/or counseling patient/family  Remainder of medical problems as per resident note.

## 2021-03-15 NOTE — PROGRESS NOTES
Attended afternoon meet and greet. Pleasant and engaged in group. Willing to share and participate in movie trivia. Patient 1 of 11 in attendance.

## 2021-03-15 NOTE — PLAN OF CARE
Problem: Anger Management/Homicidal Ideation:  Goal: Able to display appropriate communication and problem solving  Description: Able to display appropriate communication and problem solving  3/15/2021 0946 by Suzi Garcia RN  Outcome: Ongoing  PT. SPEECH IS PRESSURED, TANGENTIAL AT TIMES. PT. IS ABLE TO MAKE NEEDS KNOWN APPROPAITELY. 3/15/2021 0555 by Deisi Coleman RN  Outcome: Ongoing     Problem: Anger Management/Homicidal Ideation:  Goal: Ability to verbalize frustrations and anger appropriately will improve  Description: Ability to verbalize frustrations and anger appropriately will improve  3/15/2021 0555 by Deisi Coleman RN  Outcome: Ongoing  PT. HAS BEEN UP ON UNIT, IN CONTROL. PT. VOICES C/O ANXIETY, RACING THOUGHTS AND PARANOIA ABOUT GOVERNMENT INTENTIONS.

## 2021-03-15 NOTE — GROUP NOTE
Group Therapy Note    Date: 3/15/2021    Group Start Time: 1115  Group End Time: 1150  Group Topic: Cognitive Skills    [facility]    PACHECO Gorman, SHIKHA        Group Therapy Note    Attendees: 12           Patient's Goal: To identify healthy /unhealthy coping strategies when dealing with life stressors. Notes:  Pt was able to identify unhealthy coping strategies he's used prior to admission. Pt was receptive of new coping strategies he could use upon discharge. Status After Intervention:  Improved    Participation Level:  Active Listener and Interactive    Participation Quality: Attentive      Speech:  normal      Thought Process/Content: Logical      Affective Functioning: Congruent      Mood: depressed      Level of consciousness:  Alert, Oriented x4 and Attentive      Response to Learning: Able to verbalize/acknowledge new learning      Endings: None Reported    Modes of Intervention: Education      Discipline Responsible: /Counselor      Signature:  PACHECO Gorman, SHIKHA

## 2021-03-15 NOTE — GROUP NOTE
Group Therapy Note    Date: 3/15/2021    Group Start Time: 1000  Group End Time: 1050  Group Topic: Psychoeducation    [facility]    Akua Hoffman, CTRS        Group Therapy Note    Number of participants: 13  Type of group: Psychoeducation  Mode of intervention: Education, Support, Socialization, Exploration, Clarifying, and Problem-solving  Topic: Impulse Control   Objective: Pt will identify 1 way to have better impulse control. Notes:  Pt was interactive during group sharing 1 way to have better impulse control in recovery. Pt gave support and feedback to others. Status After Intervention:  Improved    Participation Level:  Active Listener and Interactive    Participation Quality: Appropriate, Attentive, Sharing and Supportive      Speech:  normal      Thought Process/Content: Logical      Affective Functioning: Congruent      Mood: euthymic      Level of consciousness:  Alert, Oriented x4 and Attentive      Response to Learning: Able to verbalize current knowledge/experience, Able to verbalize/acknowledge new learning, Able to retain information, Capable of insight, Able to change behavior and Progressing to goal      Endings: None Reported    Modes of Intervention: Education, Support, Socialization, Exploration, Clarifying and Problem-solving

## 2021-03-16 PROCEDURE — 1240000000 HC EMOTIONAL WELLNESS R&B

## 2021-03-16 PROCEDURE — 6370000000 HC RX 637 (ALT 250 FOR IP): Performed by: PSYCHIATRY & NEUROLOGY

## 2021-03-16 PROCEDURE — 6370000000 HC RX 637 (ALT 250 FOR IP): Performed by: NURSE PRACTITIONER

## 2021-03-16 PROCEDURE — 99232 SBSQ HOSP IP/OBS MODERATE 35: CPT | Performed by: NURSE PRACTITIONER

## 2021-03-16 RX ADMIN — BUSPIRONE HYDROCHLORIDE 30 MG: 10 TABLET ORAL at 08:31

## 2021-03-16 RX ADMIN — MIRTAZAPINE 45 MG: 15 TABLET, FILM COATED ORAL at 20:30

## 2021-03-16 RX ADMIN — HYDROXYZINE PAMOATE 50 MG: 50 CAPSULE ORAL at 14:21

## 2021-03-16 RX ADMIN — ARIPIPRAZOLE 30 MG: 15 TABLET ORAL at 08:31

## 2021-03-16 RX ADMIN — HYDROXYZINE PAMOATE 50 MG: 50 CAPSULE ORAL at 08:31

## 2021-03-16 RX ADMIN — TRAZODONE HYDROCHLORIDE 50 MG: 50 TABLET ORAL at 20:30

## 2021-03-16 RX ADMIN — ACETAMINOPHEN 650 MG: 325 TABLET, FILM COATED ORAL at 10:41

## 2021-03-16 RX ADMIN — BUSPIRONE HYDROCHLORIDE 30 MG: 10 TABLET ORAL at 20:30

## 2021-03-16 RX ADMIN — MAGNESIUM HYDROXIDE/ALUMINUM HYDROXICE/SIMETHICONE 30 ML: 120; 1200; 1200 SUSPENSION ORAL at 17:46

## 2021-03-16 ASSESSMENT — PAIN SCALES - GENERAL
PAINLEVEL_OUTOF10: 0

## 2021-03-16 NOTE — GROUP NOTE
Group Therapy Note    Date: 3/16/2021    Group Start Time: 1115  Group End Time: 1200  Group Topic: Cognitive Skills    [facility]    DOROTHY Herrera        Group Therapy Note    Attendees: 11         Patient's Goal:  Pt will be able to identify anger management skills as well as importance of practicing positive reactions to every day stressors. Notes: pt active in class discussion. Status After Intervention:  Improved    Participation Level:  Active Listener and Interactive    Participation Quality: Appropriate, Attentive, Sharing and Supportive      Speech:  normal      Thought Process/Content: Logical      Affective Functioning: Congruent      Mood: euthymic      Level of consciousness:  Alert, Oriented x4 and Attentive      Response to Learning: Able to verbalize current knowledge/experience, Able to verbalize/acknowledge new learning and Progressing to goal      Endings: None Reported    Modes of Intervention: Education, Support, Socialization and Problem-solving      Discipline Responsible: /Counselor      Signature:  DOROTHY Yanez

## 2021-03-16 NOTE — PROGRESS NOTES
Spoke about brother committing suicide when he was 23years old briefly. Also discussed hobbies , he likes to work on line with [employer] and art on line. Discussed ways to cope with obsessive thoughts by using distraction.

## 2021-03-16 NOTE — PROGRESS NOTES
BEHAVIORAL HEALTH FOLLOW-UP NOTE     3/16/2021     Patient was seen and examined in person, Chart reviewed   Patient's case discussed with staff/team    Chief Complaint:  \"I am not thinking about [employer] as much\"    Interim History: Patient is up on the unit he is out bright pleasant social attending groups. He states he is no longer worried about [employer] like he was before. He states he thought about it and that it does not make any sense. He talked about his father and about how him and his father but had sometimes. But he states he plans to just try to ignore it when his father makes comments to him. He states his thoughts are good he states his mood is good. He vehemently denies any suicidal homicidal ideations intent or plan. He is eating well sleeping well there are no neurovegetative signs symptoms of depression. Denies any auditory visual hallucinations no overt overt signs psychosis      Appetite: [x] Normal/Unchanged  [] Increased  [] Decreased      Sleep:       [] Normal/Unchanged  [x] Fair       [] Poor              Energy:    [x] Normal/Unchanged  [] Increased  [] Decreased        SI [] Present  [x] Absent    HI  []Present  [x] Absent     Aggression:  [] yes  [x] no    Patient is [x] able  [x] unable to CONTRACT FOR SAFETY     PAST MEDICAL/PSYCHIATRIC HISTORY:   Past Medical History:   Diagnosis Date    ADD (attention deficit disorder with hyperactivity)     Anxiety     Cerebral hemorrhage (HCC)     Cerebral palsy (HCC)     Depression        FAMILY/SOCIAL HISTORY:  No family history on file.   Social History     Socioeconomic History    Marital status: Single     Spouse name: Not on file    Number of children: 0    Years of education: 15    Highest education level: Not on file   Occupational History    Occupation: STUDENT     Comment: 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   Social Needs    Financial resource strain: Not on file    Food insecurity     Worry: Not on file     Inability: Not on file    Transportation needs     Medical: Not on file     Non-medical: Not on file   Tobacco Use    Smoking status: Current Every Day Smoker     Packs/day: 1.00     Types: Cigarettes    Smokeless tobacco: Former User   Substance and Sexual Activity    Alcohol use: No    Drug use: Yes     Types: Marijuana    Sexual activity: Never   Lifestyle    Physical activity     Days per week: Not on file     Minutes per session: Not on file    Stress: Not on file   Relationships    Social connections     Talks on phone: Not on file     Gets together: Not on file     Attends Druze service: Not on file     Active member of club or organization: Not on file     Attends meetings of clubs or organizations: Not on file     Relationship status: Not on file    Intimate partner violence     Fear of current or ex partner: Not on file     Emotionally abused: Not on file     Physically abused: Not on file     Forced sexual activity: Not on file   Other Topics Concern    Not on file   Social History Narrative    Not on file           ROS:  [x] All negative/unchanged except if checked.  Explain positive(checked items) below:  [] Constitutional  [] Eyes  [] Ear/Nose/Mouth/Throat  [] Respiratory  [] CV  [] GI  []   [] Musculoskeletal  [] Skin/Breast  [] Neurological  [] Endocrine  [] Heme/Lymph  [] Allergic/Immunologic    Explanation:     MEDICATIONS:    Current Facility-Administered Medications:     ondansetron (ZOFRAN-ODT) disintegrating tablet 4 mg, 4 mg, Oral, Q8H PRN, Kasia Yeager MD    ketoconazole (NIZORAL) 2 % shampoo, , Topical, See Admin Instructions, Kasia Yeager MD    acetaminophen (TYLENOL) tablet 650 mg, 650 mg, Oral, Q6H PRN, ELAINE Chinchilla CNP, 650 mg at 03/16/21 1041    magnesium hydroxide (MILK OF MAGNESIA) 400 MG/5ML suspension 30 mL, 30 mL, Oral, Daily PRN, ELAINE Chinchilla CNP    aluminum & magnesium hydroxide-simethicone (MAALOX) 471-880-46 MG/5ML suspension 30 mL, 30 mL, Oral, PRN, Gearldine Crystal, APRN - CNP    hydrOXYzine (VISTARIL) capsule 50 mg, 50 mg, Oral, TID PRN, Gearldine Crystal, APRN - CNP, 50 mg at 03/16/21 0831    haloperidol lactate (HALDOL) injection 5 mg, 5 mg, Intramuscular, Q6H PRN, 5 mg at 03/13/21 2146 **OR** haloperidol (HALDOL) tablet 5 mg, 5 mg, Oral, Q6H PRN, Gearldine Crystal, APRN - CNP    traZODone (DESYREL) tablet 50 mg, 50 mg, Oral, Nightly PRN, Gearldine Crystal, APRN - CNP, 50 mg at 03/15/21 2021    loperamide (IMODIUM) capsule 2 mg, 2 mg, Oral, 4x Daily PRN, Gearldine Crystal, APRN - CNP, 2 mg at 03/13/21 0908    ARIPiprazole (ABILIFY) tablet 30 mg, 30 mg, Oral, Daily, Carlos Bowles MD, 30 mg at 03/16/21 0831    mirtazapine (REMERON) tablet 45 mg, 45 mg, Oral, Nightly, Carlos Bowles MD, 45 mg at 03/15/21 2021    busPIRone (BUSPAR) tablet 30 mg, 30 mg, Oral, BID, Carlos Bowles MD, 30 mg at 03/16/21 0831      Examination:  BP (!) 117/57   Pulse 85   Temp 98.1 °F (36.7 °C) (Temporal)   Resp 16   Ht 5' 8\" (1.727 m)   Wt 150 lb (68 kg)   SpO2 97%   BMI 22.81 kg/m²   Gait - steadyMedication side effects(SE):    Mental Status Examination:    Level of consciousness:  within normal limits   Appearance:  fair grooming and fair hygiene  Behavior/Motor:  agitated  Attitude toward examiner:  cooperative  Speech:  normal rate and normal volume   Mood: anxious  Affect:  anxious  Thought processes:  incoherent   Thought content:  Delusions:  paranoid  Cognition:  oriented to person, place, and time   Concentration intact  Insight poor   Judgement poor     ASSESSMENT:   Patient symptoms are:  [] Well controlled  [x] Improving  [] Worsening  [] No change      Diagnosis:  Bipolar 1 depressed with psychotic features F3 1.5        LABS:    No results for input(s): WBC, HGB, PLT in the last 72 hours.   Recent Labs     03/15/21  0641      K 4.3      CO2 29   BUN 13   CREATININE 1.0   GLUCOSE 87     No results for input(s): BILITOT, ALKPHOS, AST, ALT in the last 72 hours. Lab Results   Component Value Date    LABAMPH SEE BELOW 03/12/2021    LABAMPH NOT DETECTED 03/12/2021    LABAMPH NOT DETECTED 06/25/2012    BARBSCNU SEE BELOW 03/12/2021    BARBSCNU NOT DETECTED 03/12/2021    LABBENZ SEE BELOW 03/12/2021    LABBENZ NOT DETECTED 03/12/2021    LABBENZ SEE BELOW 04/28/2014    CANNAB POSITIVE  06/25/2012    LABMETH SEE BELOW 03/12/2021    LABMETH NOT DETECTED 03/12/2021    OPIATESCREENURINE SEE BELOW 03/12/2021    OPIATESCREENURINE NOT DETECTED 03/12/2021    PHENCYCLIDINESCREENURINE SEE BELOW 03/12/2021    PHENCYCLIDINESCREENURINE NOT DETECTED 03/12/2021    ETOH <10 03/12/2021     Lab Results   Component Value Date    TSH 1.400 11/18/2019     Lab Results   Component Value Date    LITHIUM 0.19 (L) 06/30/2014     Lab Results   Component Value Date    VALPROATE 86 04/28/2014       RISK ASSESSMENT  Treatment Plan:  Reviewed current Medications with the patient. Risks, benefits, side effects, drug-to-drug interactions and alternatives to treatment were discussed. Collateral information:CD evaluation  Encourage patient to attend group and other milieu activities.   Discharge planning discussed with the patient and treatment team.    Continue Abilify 30 mg daily  Continue BuSpar 30 mg twice daily  Continue Remeron 45 mg at bedtime    PSYCHOTHERAPY/COUNSELING:  [x] Therapeutic interview  [x] Supportive  [] CBT  [] Ongoing  [] Other    [x] Patient continues to need, on a daily basis, active treatment furnished directly by or requiring the supervision of inpatient psychiatric personnel      Anticipated Length of stay:            Electronically signed by ELAINE Bah CNP on 6/28/3367 at 12:28 PM

## 2021-03-16 NOTE — PLAN OF CARE
Problem: Anger Management/Homicidal Ideation:  Goal: Able to display appropriate communication and problem solving  Description: Able to display appropriate communication and problem solving  3/15/2021 2129 by Cheryl Davey RN  Outcome: Ongoing  MAKES NEEDS KNOWN APPROPRIATELY. Problem: Anger Management/Homicidal Ideation:  Goal: Ability to verbalize frustrations and anger appropriately will improve  Description: Ability to verbalize frustrations and anger appropriately will improve  3/16/2021 1124 by Te Dalton RN  Outcome: Ongoing  MOOD ANXIOUS, BUT IS IN CONTROL WITH NO UNIT PROBLEMS. 3/15/2021 2129 by Cheryl Davey RN  Outcome: Met This Shift     Problem: Anger Management/Homicidal Ideation:  Goal: Absence of angry outbursts  Description: Absence of angry outbursts  3/16/2021 1124 by Te Dalton RN  Outcome: Met This Shift  NO OUTBURSTS, PT. IS PLEASANT AND SOCIAL ON THE UNIT WITH [employer].    3/15/2021 2129 by Cheryl Davey RN  Outcome: Met This Shift Exclude Pending Lab and Radiology orders from printing on the Patient's Discharge Instructions, due to Privacy Concerns.

## 2021-03-16 NOTE — PLAN OF CARE
Problem: Suicide risk  Goal: Provide patient with safe environment  Description: Provide patient with safe environment  Outcome: Ongoing     Problem: Anger Management/Homicidal Ideation:  Goal: Able to display appropriate communication and problem solving  Description: Able to display appropriate communication and problem solving  3/15/2021 2129 by Lola Washington RN  Outcome: Ongoing     Problem: Anger Management/Homicidal Ideation:  Goal: Ability to verbalize frustrations and anger appropriately will improve  Description: Ability to verbalize frustrations and anger appropriately will improve  Outcome: Met This Shift     Problem: Anger Management/Homicidal Ideation:  Goal: Absence of angry outbursts  Description: Absence of angry outbursts  3/15/2021 2129 by Lola Washington RN  Outcome: Met This Shift     Problem: Altered Mood, Depressive Behavior:  Goal: Able to verbalize acceptance of life and situations over which he or she has no control  Description: Able to verbalize acceptance of life and situations over which he or she has no control  Outcome: Ongoing     Problem: Altered Mood, Depressive Behavior:  Goal: Able to verbalize acceptance of life and situations over which he or she has no control  Description: Able to verbalize acceptance of life and situations over which he or she has no control  Outcome: Ongoing     Problem: Altered Mood, Depressive Behavior:  Goal: Able to verbalize and/or display a decrease in depressive symptoms  Description: Able to verbalize and/or display a decrease in depressive symptoms  Outcome: Ongoing     Problem: Altered Mood, Depressive Behavior:  Goal: Ability to disclose and discuss suicidal ideas will improve  Description: Ability to disclose and discuss suicidal ideas will improve  Outcome: Met This Shift     Problem: Altered Mood, Depressive Behavior:  Goal: Able to verbalize support systems  Description: Able to verbalize support systems  Outcome: Met This Shift frequency and intensity of racing thoughts  Outcome: Ongoing     Problem: Altered Mood, Manic Behavior:  Goal: Ability to disclose and discuss suicidal ideas will improve  Description: Ability to disclose and discuss suicidal ideas will improve  Outcome: Met This Shift     Problem: Altered Mood, Manic Behavior:  Goal: Ability to disclose and discuss suicidal ideas will improve  Description: Ability to disclose and discuss suicidal ideas will improve  Outcome: Met This Shift     Problem: Pain:  Goal: Pain level will decrease  Description: Pain level will decrease  Outcome: Ongoing     Problem: Pain:  Goal: Control of acute pain  Description: Control of acute pain  Outcome: Ongoing

## 2021-03-16 NOTE — PLAN OF CARE
[facility]  Day 3 Interdisciplinary Treatment Plan NOTE    Review Date & Time: 3/15/21 0900    Patient was in treatment team    Admission Type:   Admission Type: Involuntary    Reason for admission:  Reason for Admission: \"Basically having a lot of mental health problems ontop of physical, depression, anxiety, paranoia, mood swings, and believe me that happens a lot, and I just don't know what to do. \"  Estimated Length of Stay Update:   7 days  Estimated Discharge Date Update:  friday    PATIENT STRENGTHS:  Patient Strengths Strengths: Communication, No significant Physical Illness, Medication Compliance, Connection to output provider  Patient Strengths and Limitations:Limitations: Limited education -> difficulty reading or writing, Difficulty problem solving/relies on others to help solve problems  Addictive Behavior:Addictive Behavior  In the past 3 months, have you felt or has someone told you that you have a problem with:  : None  Do you have a history of Chemical Use?: No  Do you have a history of Alcohol Use?: No  Do you have a history of Street Drug Abuse?: No  Histroy of Prescripton Drug Abuse?: No  Medical Problems:  Past Medical History:   Diagnosis Date    ADD (attention deficit disorder with hyperactivity)     Anxiety     Cerebral hemorrhage (HCC)     Cerebral palsy ([facility] [address]     Depression        Risk:  Fall RiskTotal: 73  Jamison Scale Jamison Scale Score: 22  BVC Total: 0  Change in scores: no falls or Jamison risk identified this shift.  Changes to plan of Care: continue to assess for any increase in risk or change in statsu    Status EXAM:   Status and Exam  Normal: No  Facial Expression: Worried  Affect: Constricted  Level of Consciousness: Alert  Mood:Normal: No  Mood: Anxious, Suspicious  Motor Activity:Normal: No  Motor Activity: Increased  Interview Behavior: Cooperative  Preception: [place] to Time, [place] to Place, [place] to Person, [place] to Situation  Attention:Normal: No Attention: Distractible  Thought Processes: Circumstantial  Thought Content:Normal: No  Thought Content: Obsessions, Phobias  Hallucinations: None  Delusions: Yes  Delusions: Obsessions  Memory:Normal: No  Memory: (able to recall)  Insight and Judgment: No  Insight and Judgment: Poor Insight, Poor Judgment  Present Suicidal Ideation: No  Present Homicidal Ideation: No    Daily Assessment Last Entry:   Daily Sleep (WDL): Within Defined Limits         Patient Currently in Pain: Denies  Daily Nutrition (WDL): Within Defined Limits    Patient Monitoring:  Frequency of Checks: 4 times per hour, close    Psychiatric Symptoms:   Depression Symptoms  Depression Symptoms: (pt asleep)  Anxiety Symptoms  Anxiety Symptoms: Generalized  Breana Symptoms  Breana Symptoms: Poor judgment     Psychosis Symptoms  Delusion Type: Paranoid    Suicide Risk CSSR-S:  1) Within the past month, have you wished you were dead or wished you could go to sleep and not wake up? : Yes  2) Have you actually had any thoughts of killing yourself? : Yes  3) Have you been thinking about how you might kill yourself? : No  5) Have you started to work out or worked out the details of how to kill yourself? Do you intend to carry out this plan? : No  6) Have you ever done anything, started to do anything, or prepared to do anything to end your life?: No  Change in Result: pt.  Denies suicidal ideations Change in Plan of care: continue to assess for any increase in risk or self harm or acute change in status      EDUCATION:   Learner Progress Toward Treatment Goals: Reviewed results and recommendations of this team    Method: Small group    Outcome: Needs reinforcement    PATIENT GOALS: \"lower my anxiety\"    PLAN/TREATMENT RECOMMENDATIONS UPDATE: continue groups, Vistaril as needed for anxiety, assess for psychosis, supportive care, assess self harm risk, grief support, discharge planning and follow up with medicatons    GOALS UPDATE:   Time frame for Short-Term

## 2021-03-16 NOTE — PROGRESS NOTES
UP ON UNIT, ATTENDS GROUPS. WALKS FREQUENTLY PT. REPORTS ANXIETY. SPEECH PRESSURED, TANGENTIAL. UTILIZES PRN DOSES OF VISTARIL FOR SAME WITH GOOD EFFECT PER PT. REPORT. PT. DENIES SUICIDAL IDEATIONS, HOMICIDAL IDEATIONS AND HALLUCINATIONS. PT. REPORTS HAS BEEN THINKING LESS OF GOVERNMENT CONSPIRACIES.

## 2021-03-16 NOTE — PROGRESS NOTES
[facility]  [facility] Res[facility] Program  Progress Note - House Team 2    Patient:  Sean Romeo III 27 y.o. male MRN: [id number]     Date of Service: 3/16/2021     CC: had no chief complaint listed for this encounter. Subjective   He stated that he is feeling well. Stated that itching is completely resolved  Denies any nausea, vomiting, headache, dizziness, chest pain  No acute overnight events    Objective     Physical Exam:  · Vitals: BP (!) 117/57   Pulse 85   Temp 98.1 °F (36.7 °C) (Temporal)   Resp 16   Ht 5' 8\" (1.727 m)   Wt 150 lb (68 kg)   SpO2 97%   BMI 22.81 kg/m²     · I & O - 24hr: No intake/output data recorded.    General: alert, awake, in no acute distress  HEENT: NC, AT, moist oral mucosa  Neck: supple  Pulmonary: clear to auscultation bilaterally, no wheezing or crackles  CV: RRR, S1 S2 heard, no MRG  Abd: soft, nontender, nondistended, BS +  Ext: no pedal edema  Neuro: Alert, awake, no gross focal neurological deficit  Subject  Pertinent Labs & Imaging Studies   katie  CBC with Differential:    Lab Results   Component Value Date    WBC 9.5 03/12/2021    RBC 5.25 03/12/2021    HGB 16.2 03/12/2021    HCT 48.6 03/12/2021     03/12/2021    MCV 92.6 03/12/2021    MCH 30.9 03/12/2021    MCHC 33.3 03/12/2021    RDW 12.4 03/12/2021    SEGSPCT 64 06/23/2012    LYMPHOPCT 22.3 03/12/2021    MONOPCT 4.7 03/12/2021    BASOPCT 0.3 03/12/2021    MONOSABS 0.45 03/12/2021    LYMPHSABS 2.12 03/12/2021    EOSABS 0.10 03/12/2021    BASOSABS 0.03 03/12/2021     CMP:    Lab Results   Component Value Date     03/15/2021    K 4.3 03/15/2021     03/15/2021    CO2 29 03/15/2021    BUN 13 03/15/2021    CREATININE 1.0 03/15/2021    GFRAA >60 03/15/2021    LABGLOM >60 03/15/2021    GLUCOSE 87 03/15/2021    PROT 6.5 03/12/2021    LABALBU 4.3 03/12/2021    CALCIUM 9.5 03/15/2021    BILITOT 0.3 03/12/2021    ALKPHOS 68 03/12/2021    AST 32 03/12/2021    ALT 23 03/12/2021 Resident's Assessment and Plan     Miguel Angel Thompson III is a 27 y.o. male    Assessment:  1. Nausea; likely secondary to medication side effect vs gastritis B: Denies any heartburn, metallic taste in mouth, resolved   2. Dizziness, orthostatic VS negative, 2/2 ? medication side effect as he is on multiple antipsychotic medication,improved, orthostatic vitals negative  3. Generalized itching likely secondary to scabies vs insect bites, improved   4. Scalp itching likely secondary to seborrheic dermatitis; improved  5. Bipolar disorder; on aripiprazole, buspirone, hydroxyzine, mirtazapine, trazodone, haloperidol as needed per psychiatry  6.  Suicidal ideation     Plan:  · S/p permethrin cream topically on 3/14; generalized itching resolved  · Ketoconazole shampoo every 3 to 4 days  · Continue hydroxyzine 50 mg 3 times daily as needed for itching  · Fall/aspiration precaution    PT/OT evaluation: Not indicated at this time  Disposition: Clay Burks MD, PGY-2  Attending physician: Dr. Fernando Graf

## 2021-03-17 PROCEDURE — 6370000000 HC RX 637 (ALT 250 FOR IP): Performed by: NURSE PRACTITIONER

## 2021-03-17 PROCEDURE — 99232 SBSQ HOSP IP/OBS MODERATE 35: CPT | Performed by: NURSE PRACTITIONER

## 2021-03-17 PROCEDURE — 1240000000 HC EMOTIONAL WELLNESS R&B

## 2021-03-17 PROCEDURE — 6370000000 HC RX 637 (ALT 250 FOR IP): Performed by: PSYCHIATRY & NEUROLOGY

## 2021-03-17 RX ADMIN — ACETAMINOPHEN 650 MG: 325 TABLET, FILM COATED ORAL at 08:35

## 2021-03-17 RX ADMIN — ARIPIPRAZOLE 30 MG: 15 TABLET ORAL at 08:35

## 2021-03-17 RX ADMIN — TRAZODONE HYDROCHLORIDE 50 MG: 50 TABLET ORAL at 20:50

## 2021-03-17 RX ADMIN — MIRTAZAPINE 45 MG: 15 TABLET, FILM COATED ORAL at 20:50

## 2021-03-17 RX ADMIN — BUSPIRONE HYDROCHLORIDE 30 MG: 10 TABLET ORAL at 20:50

## 2021-03-17 RX ADMIN — BUSPIRONE HYDROCHLORIDE 30 MG: 10 TABLET ORAL at 08:35

## 2021-03-17 RX ADMIN — HYDROXYZINE PAMOATE 50 MG: 50 CAPSULE ORAL at 19:30

## 2021-03-17 ASSESSMENT — PAIN - FUNCTIONAL ASSESSMENT: PAIN_FUNCTIONAL_ASSESSMENT: ACTIVITIES ARE NOT PREVENTED

## 2021-03-17 ASSESSMENT — PAIN SCALES - GENERAL: PAINLEVEL_OUTOF10: 0

## 2021-03-17 NOTE — GROUP NOTE
Group Therapy Note    Date: 3/17/2021    Group Start Time: 1115  Group End Time: 1200  Group Topic: Cognitive Skills    [facility]    DOROTHY Herrera        Group Therapy Note    Attendees: 11         Patient's Goal:  Pt will be able to identify 3 components of green toolbox and importance of having a caring community, productive activities, and high quality relaxation. Notes:  Pt active in class discussion and activity. Status After Intervention:  Improved    Participation Level:  Active Listener    Participation Quality: Appropriate, Attentive, Sharing and Supportive      Speech:  normal      Thought Process/Content: Logical      Affective Functioning: Congruent      Mood: euthymic      Level of consciousness:  Alert, Oriented x4 and Attentive      Response to Learning: Able to verbalize current knowledge/experience, Able to verbalize/acknowledge new learning and Progressing to goal      Endings: None Reported    Modes of Intervention: Education, Support, Socialization, Problem-solving and Activity      Discipline Responsible: /Counselor      Signature:  DOROTHY Garcia

## 2021-03-17 NOTE — PLAN OF CARE
Problem: Anger Management/Homicidal Ideation:  Goal: Able to display appropriate communication and problem solving  Description: Able to display appropriate communication and problem solving  3/16/2021 2234 by Ed Olvera RN  Outcome: Ongoing  PT. MAKES NEEDS KNOWN APPROPRIATELY. Problem: Anger Management/Homicidal Ideation:  Goal: Absence of angry outbursts  Description: Absence of angry outbursts  3/17/2021 1050 by Lynn Rice RN  Outcome: Met This Shift  NO OUTBURSTS.   3/16/2021 2234 by Ed Olvera RN  Outcome: Met This Shift

## 2021-03-17 NOTE — PLAN OF CARE
Problem: Suicide risk  Goal: Provide patient with safe environment  Description: Provide patient with safe environment  Outcome: Ongoing     Problem: Anger Management/Homicidal Ideation:  Goal: Able to display appropriate communication and problem solving  Description: Able to display appropriate communication and problem solving  Outcome: Ongoing     Problem: Anger Management/Homicidal Ideation:  Goal: Ability to verbalize frustrations and anger appropriately will improve  Description: Ability to verbalize frustrations and anger appropriately will improve  3/16/2021 2234 by Farhan Gomez RN  Outcome: Met This Shift     Problem: Anger Management/Homicidal Ideation:  Goal: Absence of angry outbursts  Description: Absence of angry outbursts  3/16/2021 2234 by Farhan Gomez RN  Outcome: Met This Shift     Problem: Altered Mood, Depressive Behavior:  Goal: Able to verbalize acceptance of life and situations over which he or she has no control  Description: Able to verbalize acceptance of life and situations over which he or she has no control  Outcome: Met This Shift     Problem: Altered Mood, Depressive Behavior:  Goal: Able to verbalize and/or display a decrease in depressive symptoms  Description: Able to verbalize and/or display a decrease in depressive symptoms  Outcome: Ongoing     Problem: Altered Mood, Depressive Behavior:  Goal: Ability to disclose and discuss suicidal ideas will improve  Description: Ability to disclose and discuss suicidal ideas will improve  Outcome: Met This Shift     Problem: Altered Mood, Depressive Behavior:  Goal: Able to verbalize support systems  Description: Able to verbalize support systems  Outcome: Met This Shift     Problem: Altered Mood, Deterioration in Function:  Goal: Able to verbalize reality based thinking  Description: Able to verbalize reality based thinking  Outcome: Met This Shift     Problem: Altered Mood, Manic Behavior:  Goal: Able to sleep  Description: Able to sleep  Outcome: Ongoing

## 2021-03-17 NOTE — CARE COORDINATION
CLAUDE made an attempt to contact Justina Morgan at [phone] for collateral. No response. CLAUDE left a voice message.

## 2021-03-17 NOTE — PROGRESS NOTES
PT. HAS BEEN UP ON UNIT, ATTENDS GROUPS AND TAKES MEDICATIONS. PT. REPORTS ANXIETY, BUT STATES IS READY FOR DISCHARGE TODAY R/T DELUSIONAL, OBSESSIONAL THOUGHTS BEING MINIMAL. AWAITING COLLATERAL FROM FAMILY. PT. DENIES SUICIDAL IDEATIONS AND HOMICIDAL IDEATIONS. PT.IS PLEASANT ON APPROACH WITH NO  UNIT PROBLEMS.

## 2021-03-17 NOTE — PROGRESS NOTES
BEHAVIORAL HEALTH FOLLOW-UP NOTE     3/17/2021     Patient was seen and examined in person, Chart reviewed   Patient's case discussed with staff/team    Chief Complaint:  \"I am feeling better\"    Interim History: Patient up on the unit socializing with peers. Bright pleasant affect. He denies SI/HI intent or plan states he is eating well sleeping well no neurovegetative symptoms or symptoms of depression. States his medication is working well for him. He denies any auditory or visual hallucinations. He states he is no longer having any paranoid thoughts regarding [employer]. He is discharge focused and voices readiness for discharge    Appetite: [x] Normal/Unchanged  [] Increased  [] Decreased      Sleep:       [] Normal/Unchanged  [x] Fair       [] Poor              Energy:    [x] Normal/Unchanged  [] Increased  [] Decreased        SI [] Present  [x] Absent    HI  []Present  [x] Absent     Aggression:  [] yes  [x] no    Patient is [x] able  [x] unable to CONTRACT FOR SAFETY     PAST MEDICAL/PSYCHIATRIC HISTORY:   Past Medical History:   Diagnosis Date    ADD (attention deficit disorder with hyperactivity)     Anxiety     Cerebral hemorrhage (HCC)     Cerebral palsy (HCC)     Depression        FAMILY/SOCIAL HISTORY:  No family history on file.   Social History     Socioeconomic History    Marital status: Single     Spouse name: Not on file    Number of children: 0    Years of education: 15    Highest education level: Not on file   Occupational History    Occupation: STUDENT     Comment: 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   Social Needs    Financial resource strain: Not on file    Food insecurity     Worry: Not on file     Inability: Not on file   [employer] needs     Medical: Not on file     Non-medical: Not on file   Tobacco Use    Smoking status: Current Every Day Smoker     Packs/day: 1.00     Types: Cigarettes    Smokeless tobacco: Former User   Substance and Sexual Activity    03/13/21 2146 **OR** haloperidol (HALDOL) tablet 5 mg, 5 mg, Oral, Q6H PRN, Anderson Mill, APRN - CNP    traZODone (DESYREL) tablet 50 mg, 50 mg, Oral, Nightly PRN, Anderson Mill, APRN - CNP, 50 mg at 03/16/21 2030    loperamide (IMODIUM) capsule 2 mg, 2 mg, Oral, 4x Daily PRN, Anderson Mill, APRN - CNP, 2 mg at 03/13/21 0908    ARIPiprazole (ABILIFY) tablet 30 mg, 30 mg, Oral, Daily, Emilee Jensen MD, 30 mg at 03/17/21 0835    mirtazapine (REMERON) tablet 45 mg, 45 mg, Oral, Nightly, Emilee Jensen MD, 45 mg at 03/16/21 2030    busPIRone (BUSPAR) tablet 30 mg, 30 mg, Oral, BID, Emilee Jensen MD, 30 mg at 03/17/21 1749      Examination:  /69   Pulse 85   Temp 97.3 °F (36.3 °C) (Tympanic)   Resp 16   Ht 5' 8\" (1.727 m)   Wt 150 lb (68 kg)   SpO2 97%   BMI 22.81 kg/m²   Gait - steadyMedication side effects(SE):    Mental Status Examination:    Level of consciousness:  within normal limits   Appearance:  fair grooming and fair hygiene  Behavior/Motor:  agitated  Attitude toward examiner:  cooperative  Speech:  normal rate and normal volume   Mood: \"I feel better. \"  Affect: Appropriate and pleasant  Thought processes: Linear without flight of ideas loose associations  Thought content: Devoid of any auditory visual hallucinations delusions or other perceptual normalities. Denies SI/HI intent or plan  Cognition:  oriented to person, place, and time   Concentration intact  Insight improving  Judgement improving    ASSESSMENT:   Patient symptoms are:  [] Well controlled  [x] Improving  [] Worsening  [] No change      Diagnosis:  Bipolar 1 depressed with psychotic features F3 1.5        LABS:    No results for input(s): WBC, HGB, PLT in the last 72 hours. Recent Labs     03/15/21  0641      K 4.3      CO2 29   BUN 13   CREATININE 1.0   GLUCOSE 87     No results for input(s): BILITOT, ALKPHOS, AST, ALT in the last 72 hours.   Lab Results   Component Value Date    LABAMPH SEE BELOW 03/12/2021    LABAMPH NOT DETECTED 03/12/2021    LABAMPH NOT DETECTED 06/25/2012    BARBSCNU SEE BELOW 03/12/2021    BARBSCNU NOT DETECTED 03/12/2021    LABBENZ SEE BELOW 03/12/2021    LABBENZ NOT DETECTED 03/12/2021    LABBENZ SEE BELOW 04/28/2014    CANNAB POSITIVE  06/25/2012    LABMETH SEE BELOW 03/12/2021    LABMETH NOT DETECTED 03/12/2021    OPIATESCREENURINE SEE BELOW 03/12/2021    OPIATESCREENURINE NOT DETECTED 03/12/2021    PHENCYCLIDINESCREENURINE SEE BELOW 03/12/2021    PHENCYCLIDINESCREENURINE NOT DETECTED 03/12/2021    ETOH <10 03/12/2021     Lab Results   Component Value Date    TSH 1.400 11/18/2019     Lab Results   Component Value Date    LITHIUM 0.19 (L) 06/30/2014     Lab Results   Component Value Date    VALPROATE 86 04/28/2014       RISK ASSESSMENT  Treatment Plan:  Reviewed current Medications with the patient. Risks, benefits, side effects, drug-to-drug interactions and alternatives to treatment were discussed. Collateral information:CD evaluation  Encourage patient to attend group and other milieu activities.   Discharge planning discussed with the patient and treatment team.    Continue Abilify 30 mg daily  Continue BuSpar 30 mg twice daily  Continue Remeron 45 mg at bedtime    PSYCHOTHERAPY/COUNSELING:  [x] Therapeutic interview  [x] Supportive  [] CBT  [] Ongoing  [] Other    [x] Patient continues to need, on a daily basis, active treatment furnished directly by or requiring the supervision of inpatient psychiatric personnel      Anticipated Length of stay:            Electronically signed by ELAINE Buenrostro CNP on 5/42/3691 at 1:27 PM

## 2021-03-17 NOTE — GROUP NOTE
Group Therapy Note    Date: 3/17/2021    Group Start Time: 1248  Group End Time: 1100  Group Topic: Psychoeducation    [facility]    [place], [address]        Group Therapy Note    Date: 3/17/2021    Wellness Binder Information  Module Name: healthy vs unhealthy relationships  Patient's Goal: patient will be able to id characteristics of healthy vs unhealthy relationships. Notes:  pleasant and sharing in group. Status After Intervention:  Improved  Participation Level:  Active Listener and Interactive  Participation Quality: Appropriate, Attentive, Sharing, and Supportive  Speech:  normal   Thought Process/Content: Logical  Affective Functioning: Congruent  Mood: euthymic  Level of consciousness:  Alert, Oriented x4, and Attentive  Response to Learning: Able to verbalize/acknowledge new learning, Able to retain information, and Progressing to goal  Endings: None Reported  Modes of Intervention: Education, Support, Socialization, and Exploration  Discipline Responsible: Psychoeducational Specialist  Signature:  Deepa Hill

## 2021-03-17 NOTE — CARE COORDINATION
CLAUDE spoke with the pt's father Zonia Reaves for collateral. Mr Jimmy Samuel stated  He believes the pt has made progress since his admission . Mr Jimmy Samuel states he believes the pt would be ready for discharge late Thursday or Friday of this week. Mr Jimmy Samuel reports no safety concerns once the pt is discharged. He also noted the pt does not have access to weapons.

## 2021-03-17 NOTE — PROGRESS NOTES
Attended afternoon meet and greet. Updated on evening staffing and expectations. Pleasant and engaged in group. Participated in birthday trivia. Patient 1 of 14.

## 2021-03-18 VITALS
TEMPERATURE: 98 F | DIASTOLIC BLOOD PRESSURE: 85 MMHG | HEIGHT: 68 IN | HEART RATE: 74 BPM | BODY MASS INDEX: 22.73 KG/M2 | RESPIRATION RATE: 16 BRPM | OXYGEN SATURATION: 96 % | SYSTOLIC BLOOD PRESSURE: 139 MMHG | WEIGHT: 150 LBS

## 2021-03-18 PROCEDURE — 6370000000 HC RX 637 (ALT 250 FOR IP): Performed by: PSYCHIATRY & NEUROLOGY

## 2021-03-18 PROCEDURE — 99239 HOSP IP/OBS DSCHRG MGMT >30: CPT | Performed by: NURSE PRACTITIONER

## 2021-03-18 RX ORDER — MIRTAZAPINE 45 MG/1
45 TABLET, FILM COATED ORAL NIGHTLY
Qty: 30 TABLET | Refills: 0 | Status: ON HOLD | OUTPATIENT
Start: 2021-03-18 | End: 2021-04-05 | Stop reason: HOSPADM

## 2021-03-18 RX ORDER — ARIPIPRAZOLE 30 MG/1
30 TABLET ORAL DAILY
Qty: 30 TABLET | Refills: 0 | Status: ON HOLD | OUTPATIENT
Start: 2021-03-19 | End: 2021-04-05 | Stop reason: HOSPADM

## 2021-03-18 RX ORDER — BUSPIRONE HYDROCHLORIDE 30 MG/1
30 TABLET ORAL 2 TIMES DAILY
Qty: 60 TABLET | Refills: 0 | Status: ON HOLD | OUTPATIENT
Start: 2021-03-18 | End: 2021-04-05 | Stop reason: HOSPADM

## 2021-03-18 RX ADMIN — ARIPIPRAZOLE 30 MG: 15 TABLET ORAL at 09:47

## 2021-03-18 RX ADMIN — BUSPIRONE HYDROCHLORIDE 30 MG: 10 TABLET ORAL at 09:47

## 2021-03-18 ASSESSMENT — PAIN SCALES - GENERAL
PAINLEVEL_OUTOF10: 0
PAINLEVEL_OUTOF10: 0

## 2021-03-18 NOTE — PROGRESS NOTES
[facility]  Discharge Note    Pt discharged with followings belongings:   Dentures: None  Vision - Corrective Lenses: None  Hearing Aid: None  Jewelry: None  Body Piercings Removed: N/A  Clothing: Footwear, Pants, Shirt, Socks, Other (Comment)(hoodie and shorts with string)  Other Valuables: Wallet   Valuables sent home with pt. Valuables retrieved from safe, Security envelope number:  [id number] and returned to patient. Patient education on aftercare instructions:  Given, along with suicide crisis management plan. Pt. Received no medications at discharge, for all were too soon to fill and pt. confirmed had all at home. Information faxed to  [facility] by  . Patient verbalize understanding of AVS:   Yes with stated potential to comply to same.  .    Status EXAM upon discharge:  Status and Exam  Normal: yes  Facial Expression: Elevated  Affect: Congruent  Level of Consciousness: Alert  Mood: pleasant  Motor Activity:Normal: yes  Interview Behavior: Cooperative  Preception: [place] to Person, Jassi Lever to Time, [place] to Place, [place] to Situation  Attention: improved  Thought Processes: more organized  Thought Content:Normal: yes  Thought Content: appropriate to circumstances  Hallucinations: None  Delusions: No  Memory:Normal: Yes  Insight and Judgment: improved  Present Suicidal Ideation: No  Present Homicidal Ideation: No      Metabolic Screening:    Lab Results   Component Value Date    LABA1C 5.0 03/14/2021       Lab Results   Component Value Date    CHOL 100 03/14/2021     Lab Results   Component Value Date    TRIG 64 03/14/2021     Lab Results   Component Value Date    HDL 29 03/14/2021     No components found for: [facility] [place]  Lab Results   Component Value Date    LABVLDL 13 03/14/2021       Holly White RN

## 2021-03-18 NOTE — DISCHARGE SUMMARY
DISCHARGE SUMMARY      Patient ID:  Sharlene Coyne  [id number]  77 y.o.  1995    Admit date: 3/13/2021    Discharge date and time: 3/18/2021    Admitting Physician: Cynthia Rosenthal MD     Discharge Physician: Dr Rosemary Baugh MD    Discharge Diagnoses:   Patient Active Problem List   Diagnosis    Mood disorder ([facility]    Cannabis use disorder, moderate, dependence ([facility]    Tobacco use disorder, continuous    Severe recurrent major depression with psychotic features ([facility]    Depression    Gender dysphoria in adult    Suicidal ideation    Depression, major, single episode    Bipolar I disorder, most recent episode depressed, severe with psychotic features ([facility]       Admission Condition: poor    Discharged Condition: stable    Admission Circumstance: Patient presented to ED with depressed mood and suicidal ideations      PAST MEDICAL/PSYCHIATRIC HISTORY:   Past Medical History:   Diagnosis Date    ADD (attention deficit disorder with hyperactivity)     Anxiety     Cerebral hemorrhage ([facility]     Cerebral palsy ([facility]     Depression        FAMILY/SOCIAL HISTORY:  No family history on file.   Social History     Socioeconomic History    Marital status: Single     Spouse name: Not on file    Number of children: 0    Years of education: 15    Highest education level: Not on file   Occupational History    Occupation: STUDENT     Comment: 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   Social Needs    Financial resource strain: Not on file    Food insecurity     Worry: Not on file     Inability: Not on file   Performable needs     Medical: Not on file     Non-medical: Not on file   Tobacco Use    Smoking status: Current Every Day Smoker     Packs/day: 1.00     Types: Cigarettes    Smokeless tobacco: Former User   Substance and Sexual Activity    Alcohol use: No    Drug use: Yes     Types: Marijuana    Sexual activity: Never   Lifestyle    Physical activity     Days per week: Not on file     Minutes per session: Not on file    Stress: Not on file   Relationships    Social connections     Talks on phone: Not on file     Gets together: Not on file     Attends Hindu service: Not on file     Active member of club or organization: Not on file     Attends meetings of clubs or organizations: Not on file     Relationship status: Not on file    Intimate partner violence     Fear of current or ex partner: Not on file     Emotionally abused: Not on file     Physically abused: Not on file     Forced sexual activity: Not on file   Other Topics Concern    Not on file   Social History Narrative    Not on file       MEDICATIONS:    Current Facility-Administered Medications:     ondansetron (ZOFRAN-ODT) disintegrating tablet 4 mg, 4 mg, Oral, Q8H PRN, Iker Johnson MD    ketoconazole (NIZORAL) 2 % shampoo, , Topical, See Admin Instructions, Iker Johnson MD    acetaminophen (TYLENOL) tablet 650 mg, 650 mg, Oral, Q6H PRN, Gearldine Crystal, APRN - CNP, 650 mg at 03/17/21 0835    magnesium hydroxide (MILK OF MAGNESIA) 400 MG/5ML suspension 30 mL, 30 mL, Oral, Daily PRN, Gearldine Crystal, APRN - CNP    aluminum & magnesium hydroxide-simethicone (MAALOX) 200-200-20 MG/5ML suspension 30 mL, 30 mL, Oral, PRN, Gearldine Crystal, APRN - CNP, 30 mL at 03/16/21 1746    hydrOXYzine (VISTARIL) capsule 50 mg, 50 mg, Oral, TID PRN, Gearldine Crystal, APRN - CNP, 50 mg at 03/17/21 1930    haloperidol lactate (HALDOL) injection 5 mg, 5 mg, Intramuscular, Q6H PRN, 5 mg at 03/13/21 2146 **OR** haloperidol (HALDOL) tablet 5 mg, 5 mg, Oral, Q6H PRN, Gearldine Crystal, APRN - CNP    traZODone (DESYREL) tablet 50 mg, 50 mg, Oral, Nightly PRN, Gearldine Crystal, APRN - CNP, 50 mg at 03/17/21 2050    loperamide (IMODIUM) capsule 2 mg, 2 mg, Oral, 4x Daily PRN, Gearldine Crystal, APRN - CNP, 2 mg at 03/13/21 0908    ARIPiprazole (ABILIFY) tablet 30 mg, 30 mg, Oral, Daily, Carlos Bowles MD, 30 mg at 03/18/21 0947    mirtazapine (REMERON) tablet 45 mg, 45 mg, Oral, Nightly, Magdalena Browne MD, 45 mg at 03/17/21 2050    busPIRone (BUSPAR) tablet 30 mg, 30 mg, Oral, BID, Magdalena Browne MD, 30 mg at 03/18/21 0982    Current Outpatient Medications:     busPIRone (BUSPAR) 30 MG tablet, Take 30 mg by mouth 2 times daily, Disp: 60 tablet, Rfl: 0    [START ON 3/19/2021] ARIPiprazole (ABILIFY) 30 MG tablet, Take 1 tablet by mouth daily, Disp: 30 tablet, Rfl: 0    mirtazapine (REMERON) 45 MG tablet, Take 1 tablet by mouth nightly, Disp: 30 tablet, Rfl: 0    Examination:  /85   Pulse 74   Temp 98 °F (36.7 °C) (Temporal)   Resp 16   Ht 5' 8\" (1.727 m)   Wt 150 lb (68 kg)   SpO2 96%   BMI 22.81 kg/m²   Gait - steady    HOSPITAL COURSE[de-identified]   Patient was admitted to the unit on 3/13/2021 was closely monitored for suicidal ideations and psychosis. He was evaluated was treated with Abilify 30 mg daily and BuSpar 30 mg twice daily. Medical events were insignificant and patient continued to improve on the floor. He start coming out of his room he is attending groups to socializing with peers. He never made any suicidal statements or any suicidal gestures while in the unit. He was no longer having any paranoid thoughts and felt that his medication is working very well for him. Social workers obtain collateral information from patient's father who was able to voice any concerns that he had. He reported no safety concerns no access and felt patient was ready for discharge and no access to any weapons treatment team felt the patient obtain the maximum benefit from his hospitalization he was set up with an outpatient mental health agency for outpatient follow-up services. At the time of discharge patient did not show any impulsive behavior. He stated he had been talking to his father on the phone and that they had a better relationship now and felt much better about going home. He was up on the unit he was attending groups and socializing with peers.   He vehemently denied any suicidal or homicidal ideations intent or plan. He was eating well and sleeping well there are no neurovegetative signs or symptoms of depression he denied any auditory or visual hallucinations. There are no overt or covert signs of psychosis. He was appreciative of the help that he received here. This patient no longer meets criteria for inpatient hospitalization. No AVH or paranoid thoughts  No hopeless or worthless feeling  No active SI/HI  Appetite:  [x] Normal  [] Increased  [] Decreased    Sleep:       [x] Normal  [] Fair       [] Poor            Energy:    [x] Normal  [] Increased  [] Decreased     SI [] Present  [x] Absent  HI  []Present  [x] Absent   Aggression:  [] yes  [x] no  Patient is [x] able  [] unable to CONTRACT FOR SAFETY   Medication side effects(SE):  [x] None(Psych. Meds.) [] Other      Mental Status Examination on discharge:    Level of consciousness:  within normal limits   Appearance:  well-appearing  Behavior/Motor:  no abnormalities noted  Attitude toward examiner:  attentive and good eye contact  Speech:  spontaneous, normal rate and normal volume   Mood: \" I feel a lot better. \"  Affect: Appropriate and pleasant  Thought processes: Linear without flight of ideas or loose associations  Thought content: Devoid of any auditory visual hallucinations delusions or other perceptual denies.   Denies SI/HI intent or plan  Cognition:  oriented to person, place, and time   Concentration intact  Memory intact  Insight good   Judgement fair   Fund of Knowledge adequate      ASSESSMENT:  Patient symptoms are:  [x] Well controlled  [x] Improving  [] Worsening  [] No change    Reason for more than one antipsychotic:  [x] N/A  [] 3 Failed Monotherapy attempts (Drugs tried:)  [] Crossover to a new antipsychotic  [] Taper to Monotherapy from Polypharmacy  [] Augmentation of clozapine therapy due to treatment resistance to single therapy    Diagnosis:  Principal Problem: Bipolar I disorder, most recent episode depressed, severe with psychotic features ([address]  Active Problems:    Cannabis use disorder, moderate, dependence ([address]  Resolved Problems:    * No resolved hospital problems. *      LABS:    No results for input(s): WBC, HGB, PLT in the last 72 hours. No results for input(s): NA, K, CL, CO2, BUN, CREATININE, GLUCOSE in the last 72 hours. No results for input(s): BILITOT, ALKPHOS, AST, ALT in the last 72 hours. Lab Results   Component Value Date    LABAMPH SEE BELOW 03/12/2021    LABAMPH NOT DETECTED 03/12/2021    LABAMPH NOT DETECTED 06/25/2012    BARBSCNU SEE BELOW 03/12/2021    BARBSCNU NOT DETECTED 03/12/2021    LABBENZ SEE BELOW 03/12/2021    LABBENZ NOT DETECTED 03/12/2021    LABBENZ SEE BELOW 04/28/2014    CANNAB POSITIVE  06/25/2012    LABMETH SEE BELOW 03/12/2021    LABMETH NOT DETECTED 03/12/2021    OPIATESCREENURINE SEE BELOW 03/12/2021    OPIATESCREENURINE NOT DETECTED 03/12/2021    PHENCYCLIDINESCREENURINE SEE BELOW 03/12/2021    PHENCYCLIDINESCREENURINE NOT DETECTED 03/12/2021    ETOH <10 03/12/2021     Lab Results   Component Value Date    TSH 1.400 11/18/2019     Lab Results   Component Value Date    LITHIUM 0.19 (L) 06/30/2014     Lab Results   Component Value Date    VALPROATE 86 04/28/2014       RISK ASSESSMENT AT DISCHARGE: Low risk for suicide and homicide. Treatment Plan:  Reviewed current Medications with the patient. Education provided on the complaince with treatment. Risks, benefits, side effects, drug-to-drug interactions and alternatives to treatment were discussed. Encourage patient to attend outpatient follow up appointment and therapy. Patient was advised to call the outpatient provider, visit the nearest ED or call 911 if symptoms are not manageable. Patient's family member was contacted prior to the discharge.          Medication List      START taking these medications    ARIPiprazole 30 MG tablet  Commonly known as: ABILIFY Take 1 tablet by mouth daily  Start taking on: March 19, 2021     mirtazapine 45 MG tablet  Commonly known as: REMERON  Take 1 tablet by mouth nightly        CHANGE how you take these medications    busPIRone 30 MG tablet  Commonly known as: BUSPAR  Take 30 mg by mouth 2 times daily  What changed:   · medication strength  · how much to take        STOP taking these medications    ibuprofen 800 MG tablet  Commonly known as: ADVIL;MOTRIN     QUEtiapine 300 MG extended release tablet  Commonly known as: SEROQUEL XR           Where to Get Your Medications      These medications were sent to [facility] [address] [address]    Phone: [phone]   · ARIPiprazole 30 MG tablet  · busPIRone 30 MG tablet  · mirtazapine 45 MG tablet       Patient is counseled if he continues to abuse drugs or alcohol he could act out impulsively causing serious harm to himself or others even though may be unintentional.  He demonstrated understanding of this and has the capacity understand this    Patient is counseled hisr mental health treatment will be difficult to optimize with ongoing use of drugs or alcohol he demonstrate understanding of this as the past understand this     Patient is counseled he must remain compliant with all medications outpatient follow-up ointments    Patient is discharged home in stable condition    TIME SPEND - 35 MINUTES TO COMPLETE THE EVALUATION, DISCHARGE SUMMARY, MEDICATION RECONCILIATION AND FOLLOW UP CARE     Signed:  Philly Shaver  4/97/6255  6:90 PM

## 2021-03-18 NOTE — PLAN OF CARE
Problem: Suicide risk  Goal: Provide patient with safe environment  Description: Provide patient with safe environment  Outcome: Ongoing     Problem: Anger Management/Homicidal Ideation:  Goal: Able to display appropriate communication and problem solving  Description: Able to display appropriate communication and problem solving  Outcome: Ongoing     Problem: Anger Management/Homicidal Ideation:  Goal: Ability to verbalize frustrations and anger appropriately will improve  Description: Ability to verbalize frustrations and anger appropriately will improve  3/17/2021 2103 by Triston Hernandez RN  Outcome: Met This Shift     Problem: Anger Management/Homicidal Ideation:  Goal: Absence of angry outbursts  Description: Absence of angry outbursts  3/17/2021 2103 by Triston Hernandez RN  Outcome: Met This Shift     Problem: Altered Mood, Depressive Behavior:  Goal: Able to verbalize acceptance of life and situations over which he or she has no control  Description: Able to verbalize acceptance of life and situations over which he or she has no control  Outcome: Ongoing     Problem: Altered Mood, Depressive Behavior:  Goal: Able to verbalize and/or display a decrease in depressive symptoms  Description: Able to verbalize and/or display a decrease in depressive symptoms  Outcome: Ongoing     Problem: Altered Mood, Depressive Behavior:  Goal: Ability to disclose and discuss suicidal ideas will improve  Description: Ability to disclose and discuss suicidal ideas will improve  Outcome: Ongoing     Problem: Altered Mood, Depressive Behavior:  Goal: Able to verbalize support systems  Description: Able to verbalize support systems  Outcome: Ongoing     Problem: Altered Mood, Depressive Behavior:  Goal: Ability to disclose and discuss suicidal ideas will improve  Description: Ability to disclose and discuss suicidal ideas will improve  Outcome: Ongoing     Problem: Depressive Behavior With or Without Suicide Precautions:  Goal: Able to verbalize acceptance of life and situations over which he or she has no control  Description: Able to verbalize acceptance of life and situations over which he or she has no control  Outcome: Ongoing     Problem: Depressive Behavior With or Without Suicide Precautions:  Goal: Able to verbalize and/or display a decrease in depressive symptoms  Description: Able to verbalize and/or display a decrease in depressive symptoms  Outcome: Met This Shift     Problem: Depressive Behavior With or Without Suicide Precautions:  Goal: Ability to disclose and discuss suicidal ideas will improve  Description: Ability to disclose and discuss suicidal ideas will improve  Outcome: Met This Shift     Problem: Depressive Behavior With or Without Suicide Precautions:  Goal: Able to verbalize support systems  Description: Able to verbalize support systems  Outcome: Met This Shift     Problem: Altered Mood, Deterioration in Function:  Goal: Ability to perform activities of daily living will improve  Description: Ability to perform activities of daily living will improve  Outcome: Ongoing     Problem: Altered Mood, Deterioration in Function:  Goal: Able to verbalize reality based thinking  Description: Able to verbalize reality based thinking  Outcome: Met This Shift     Problem: Altered Mood, Deterioration in Function:  Goal: Skin appearance normal  Description: Skin appearance normal  Outcome: Met This Shift     Problem: Altered Mood, Manic Behavior:  Goal: Able to sleep  Description: Able to sleep  Outcome: Met This Shift     Problem: Altered Mood, Manic Behavior:  Goal: Able to verbalize decrease in frequency and intensity of racing thoughts  Description: Able to verbalize decrease in frequency and intensity of racing thoughts  Outcome: Ongoing     Problem: Altered Mood, Manic Behavior:  Goal: Ability to disclose and discuss suicidal ideas will improve  Description: Ability to disclose and discuss suicidal ideas will improve Outcome: Met This Shift     Problem: Pain:  Goal: Pain level will decrease  Description: Pain level will decrease  Outcome: Ongoing     Problem: Pain:  Goal: Control of acute pain  Description: Control of acute pain  Outcome: Ongoing     Problem: Pain:  Goal: Control of chronic pain  Description: Control of chronic pain  Outcome: Ongoing

## 2021-03-18 NOTE — GROUP NOTE
Group Therapy Note    Date: 3/18/2021    Group Start Time: 1120  Group End Time: 1200  Group Topic: Cognitive Skills    [facility]    DOROTHY Herrera        Group Therapy Note    Attendees: 13         Patient's Goal:  Pt will be able to recognize negative communication characteristics they engage in and be able to identify positive communication techniques they will use instead. Notes:  Pt active participant in group discussion. Status After Intervention:  Improved    Participation Level:  Active Listener and Interactive    Participation Quality: Appropriate, Attentive, Sharing and Supportive      Speech:  normal      Thought Process/Content: Logical      Affective Functioning: Congruent      Mood: euthymic      Level of consciousness:  Alert, Oriented x4 and Attentive      Response to Learning: Able to verbalize current knowledge/experience, Able to verbalize/acknowledge new learning and Progressing to goal      Endings: None Reported    Modes of Intervention: Education, Support, Socialization and Problem-solving      Discipline Responsible: /Counselor      Signature:  DOROTHY Lehman

## 2021-03-18 NOTE — GROUP NOTE
Group Therapy Note    Date: 3/17/2021    Group Start Time: 2000  Group End Time: 2030  Group Topic: Wrap-Up    [facility] [facility] [address] [address], RN        Group Therapy Note    Attendees: 11

## 2021-03-18 NOTE — SUICIDE SAFETY PLAN
SAFETY PLAN    A suicide Safety Plan is a document that supports someone when they are having thoughts of suicide. Warning Signs that indicate a suicidal crisis may be developing: What (situations, thoughts, feelings, body sensations, behaviors, etc.) do you experience that lets you know you are beginning to think about suicide? 1. Anger/defensive/verbally aggressive  2. paranoia  3. trouble asleep    Internal Coping Strategies:  What things can I do (relaxation techniques, hobbies, physical activities, etc.) to take my mind off my problems without contacting another person? 1. sing  2. draw  3. write    People and social settings that provide distraction: Who can I call or where can I go to distract me? 1. Name: Gearldean Fabry  Phone: in cell phone contacts  2. Name: Marilyn Mckeon  Phone: In cell phone contacts  3. Place: room            4. Place: patio    People whom I can ask for help: Who can I call when I need help - for example, friends, family, clergy, someone else? 1. Name:  mom        Phone: [phone]  2. Name:  dad  Phone: [phone]  3. Name: Todd Mae  Phone: in cell phone contacts    Professionals or [employer] agencies I can contact during a crisis: Who can I call for help - for example, my doctor, my psychiatrist, my psychologist, a mental health provider, a suicide hotline? 1. Clinician Name: KAZ   Phone: [phone] [facility] Pager or Emergency Contact #: [phone]       7. Suicide Prevention Lifeline: 2-768-102-TALK (6959)    3. [address] [place] [address] Emergency Services -  for example, [facility] suicide hotline, [facility] Hotline: 679      Emergency Services Address: [place], [place]      Emergency Services Phone: [phone]    Making the environment safe: How can I make my environment (house/apartment/living space) safer? For example, can I remove guns, medications, and other items? 1. No guns  2.  No alcohol or drugs

## 2021-03-18 NOTE — GROUP NOTE
Group Therapy Note    Date: 3/18/2021    Group Start Time: 1010  Group End Time: 7561  Group Topic: Psychoeducation    [facility]    Diana Suarez, JAE        Group Therapy Note      Type of Group: Psychoeducation    Wellness Binder Information  Module Name:  self esteem self worth     Patient's Goal: patient will be able to id steps to increase ones own wellness on a daily basis    Notes:  pleasant and engaged in group     Status After Intervention:  Improved    Participation Level:  Active Listener and Interactive    Participation Quality: Appropriate, Attentive, Sharing, and Supportive      Speech:  normal       Thought Process/Content: Logical      Affective Functioning: Congruent      Mood: euthymic      Level of consciousness:  Alert, Oriented x4, and Attentive      Response to Learning: Able to verbalize/acknowledge new learning, Able to retain information, and Progressing to goal      Endings: None Reported    Modes of Intervention: Education, Support, Socialization, Exploration, and Problem-solving      Discipline Responsible: Psychoeducational Specialist      Signature:  Francia Alanis

## 2021-03-18 NOTE — CARE COORDINATION
In order to ensure appropriate transition and discharge planning is in place, the following documents have been transmitted to [facility] , as the new outpatient provider:     The d/c diagnosis was transmitted to the next care provider   The reason for hospitalization was transmitted to the next care provider   The d/c medications (dosage and indication) were transmitted to the next care provider    The continuing care plan was transmitted to the next care provider

## 2021-03-19 NOTE — FLOWSHEET NOTE
SW contacted pt for post follow up post discharge phone call. Pt was reminded of their upcoming appointment.  Pt stated he is doing well

## 2021-03-30 ENCOUNTER — HOSPITAL ENCOUNTER (INPATIENT)
Age: 26
LOS: 6 days | Discharge: HOME | DRG: 753 | End: 2021-04-05
Attending: EMERGENCY MEDICINE | Admitting: PSYCHIATRY & NEUROLOGY
Payer: COMMERCIAL

## 2021-03-30 DIAGNOSIS — F23 ACUTE PSYCHOSIS (HCC): Primary | ICD-10-CM

## 2021-03-30 LAB
ACETAMINOPHEN LEVEL: <5 MCG/ML (ref 10–30)
ALBUMIN SERPL-MCNC: 4.3 G/DL (ref 3.5–5.2)
ALP BLD-CCNC: 64 U/L (ref 40–129)
ALT SERPL-CCNC: 39 U/L (ref 0–40)
AMPHETAMINE SCREEN, URINE: NOT DETECTED
ANION GAP SERPL CALCULATED.3IONS-SCNC: 12 MMOL/L (ref 7–16)
AST SERPL-CCNC: 28 U/L (ref 0–39)
BARBITURATE SCREEN URINE: NOT DETECTED
BASOPHILS ABSOLUTE: 0.04 E9/L (ref 0–0.2)
BASOPHILS RELATIVE PERCENT: 0.4 % (ref 0–2)
BENZODIAZEPINE SCREEN, URINE: NOT DETECTED
BILIRUB SERPL-MCNC: 0.5 MG/DL (ref 0–1.2)
BUN BLDV-MCNC: 7 MG/DL (ref 6–20)
CALCIUM SERPL-MCNC: 9.5 MG/DL (ref 8.6–10.2)
CANNABINOID SCREEN URINE: POSITIVE
CHLORIDE BLD-SCNC: 103 MMOL/L (ref 98–107)
CO2: 27 MMOL/L (ref 22–29)
COCAINE METABOLITE SCREEN URINE: NOT DETECTED
CREAT SERPL-MCNC: 1 MG/DL (ref 0.7–1.2)
EKG ATRIAL RATE: 85 BPM
EKG P AXIS: 59 DEGREES
EKG P-R INTERVAL: 156 MS
EKG Q-T INTERVAL: 368 MS
EKG QRS DURATION: 90 MS
EKG QTC CALCULATION (BAZETT): 437 MS
EKG R AXIS: 97 DEGREES
EKG T AXIS: 71 DEGREES
EKG VENTRICULAR RATE: 85 BPM
EOSINOPHILS ABSOLUTE: 0.07 E9/L (ref 0.05–0.5)
EOSINOPHILS RELATIVE PERCENT: 0.7 % (ref 0–6)
ETHANOL: <10 MG/DL (ref 0–0.08)
FENTANYL SCREEN, URINE: NOT DETECTED
GFR AFRICAN AMERICAN: >60
GFR NON-AFRICAN AMERICAN: >60 ML/MIN/1.73
GLUCOSE BLD-MCNC: 92 MG/DL (ref 74–99)
HCT VFR BLD CALC: 48.8 % (ref 37–54)
HEMOGLOBIN: 17 G/DL (ref 12.5–16.5)
IMMATURE GRANULOCYTES #: 0.04 E9/L
IMMATURE GRANULOCYTES %: 0.4 % (ref 0–5)
LYMPHOCYTES ABSOLUTE: 2.73 E9/L (ref 1.5–4)
LYMPHOCYTES RELATIVE PERCENT: 26.6 % (ref 20–42)
Lab: ABNORMAL
MCH RBC QN AUTO: 31.7 PG (ref 26–35)
MCHC RBC AUTO-ENTMCNC: 34.8 % (ref 32–34.5)
MCV RBC AUTO: 91 FL (ref 80–99.9)
METHADONE SCREEN, URINE: NOT DETECTED
MONOCYTES ABSOLUTE: 0.5 E9/L (ref 0.1–0.95)
MONOCYTES RELATIVE PERCENT: 4.9 % (ref 2–12)
NEUTROPHILS ABSOLUTE: 6.89 E9/L (ref 1.8–7.3)
NEUTROPHILS RELATIVE PERCENT: 67 % (ref 43–80)
OPIATE SCREEN URINE: NOT DETECTED
OXYCODONE URINE: NOT DETECTED
PDW BLD-RTO: 12.4 FL (ref 11.5–15)
PHENCYCLIDINE SCREEN URINE: NOT DETECTED
PLATELET # BLD: 246 E9/L (ref 130–450)
PMV BLD AUTO: 10.4 FL (ref 7–12)
POTASSIUM REFLEX MAGNESIUM: 4 MMOL/L (ref 3.5–5)
RBC # BLD: 5.36 E12/L (ref 3.8–5.8)
SALICYLATE, SERUM: <0.3 MG/DL (ref 0–30)
SARS-COV-2, NAAT: NOT DETECTED
SODIUM BLD-SCNC: 142 MMOL/L (ref 132–146)
T4 TOTAL: 7.5 MCG/DL (ref 4.5–11.7)
TOTAL PROTEIN: 6.8 G/DL (ref 6.4–8.3)
TRICYCLIC ANTIDEPRESSANTS SCREEN SERUM: NEGATIVE NG/ML
TSH SERPL DL<=0.05 MIU/L-ACNC: 0.89 UIU/ML (ref 0.27–4.2)
WBC # BLD: 10.3 E9/L (ref 4.5–11.5)

## 2021-03-30 PROCEDURE — 87635 SARS-COV-2 COVID-19 AMP PRB: CPT

## 2021-03-30 PROCEDURE — 6370000000 HC RX 637 (ALT 250 FOR IP): Performed by: PSYCHIATRY & NEUROLOGY

## 2021-03-30 PROCEDURE — 80179 DRUG ASSAY SALICYLATE: CPT

## 2021-03-30 PROCEDURE — 80307 DRUG TEST PRSMV CHEM ANLYZR: CPT

## 2021-03-30 PROCEDURE — 1240000000 HC EMOTIONAL WELLNESS R&B

## 2021-03-30 PROCEDURE — 80053 COMPREHEN METABOLIC PANEL: CPT

## 2021-03-30 PROCEDURE — 82077 ASSAY SPEC XCP UR&BREATH IA: CPT

## 2021-03-30 PROCEDURE — 80143 DRUG ASSAY ACETAMINOPHEN: CPT

## 2021-03-30 PROCEDURE — 99282 EMERGENCY DEPT VISIT SF MDM: CPT

## 2021-03-30 PROCEDURE — 84436 ASSAY OF TOTAL THYROXINE: CPT

## 2021-03-30 PROCEDURE — 84443 ASSAY THYROID STIM HORMONE: CPT

## 2021-03-30 PROCEDURE — 85025 COMPLETE CBC W/AUTO DIFF WBC: CPT

## 2021-03-30 PROCEDURE — 93005 ELECTROCARDIOGRAM TRACING: CPT | Performed by: EMERGENCY MEDICINE

## 2021-03-30 RX ORDER — HALOPERIDOL 5 MG
5 TABLET ORAL EVERY 6 HOURS PRN
Status: DISCONTINUED | OUTPATIENT
Start: 2021-03-30 | End: 2021-04-05 | Stop reason: HOSPADM

## 2021-03-30 RX ORDER — TRAZODONE HYDROCHLORIDE 50 MG/1
50 TABLET ORAL NIGHTLY PRN
Status: DISCONTINUED | OUTPATIENT
Start: 2021-03-30 | End: 2021-04-05 | Stop reason: HOSPADM

## 2021-03-30 RX ORDER — ACETAMINOPHEN 325 MG/1
650 TABLET ORAL EVERY 6 HOURS PRN
Status: DISCONTINUED | OUTPATIENT
Start: 2021-03-30 | End: 2021-04-05 | Stop reason: HOSPADM

## 2021-03-30 RX ORDER — MAGNESIUM HYDROXIDE/ALUMINUM HYDROXICE/SIMETHICONE 120; 1200; 1200 MG/30ML; MG/30ML; MG/30ML
30 SUSPENSION ORAL PRN
Status: DISCONTINUED | OUTPATIENT
Start: 2021-03-30 | End: 2021-04-05 | Stop reason: HOSPADM

## 2021-03-30 RX ORDER — HYDROXYZINE PAMOATE 50 MG/1
50 CAPSULE ORAL 3 TIMES DAILY PRN
Status: DISCONTINUED | OUTPATIENT
Start: 2021-03-30 | End: 2021-04-05 | Stop reason: HOSPADM

## 2021-03-30 RX ORDER — TRAZODONE HYDROCHLORIDE 50 MG/1
50 TABLET ORAL DAILY
Status: ON HOLD | COMMUNITY
End: 2021-04-05 | Stop reason: HOSPADM

## 2021-03-30 RX ORDER — HALOPERIDOL 5 MG/ML
5 INJECTION INTRAMUSCULAR EVERY 6 HOURS PRN
Status: DISCONTINUED | OUTPATIENT
Start: 2021-03-30 | End: 2021-04-05 | Stop reason: HOSPADM

## 2021-03-30 RX ADMIN — TRAZODONE HYDROCHLORIDE 50 MG: 50 TABLET ORAL at 21:48

## 2021-03-30 ASSESSMENT — PAIN DESCRIPTION - FREQUENCY: FREQUENCY: INTERMITTENT

## 2021-03-30 ASSESSMENT — ENCOUNTER SYMPTOMS
EYE REDNESS: 0
ABDOMINAL PAIN: 0
VOMITING: 0
SHORTNESS OF BREATH: 0
NAUSEA: 0

## 2021-03-30 ASSESSMENT — SLEEP AND FATIGUE QUESTIONNAIRES
DIFFICULTY ARISING: NO
DO YOU USE A SLEEP AID: YES
RESTFUL SLEEP: YES
DIFFICULTY FALLING ASLEEP: NO
AVERAGE NUMBER OF SLEEP HOURS: 4

## 2021-03-30 ASSESSMENT — PATIENT HEALTH QUESTIONNAIRE - PHQ9: SUM OF ALL RESPONSES TO PHQ QUESTIONS 1-9: 4

## 2021-03-30 NOTE — ED PROVIDER NOTES
that he has been smoking cigarettes. He has been smoking about 1.00 pack per day. He has quit using smokeless tobacco. He reports current drug use. Frequency: 7.00 times per week. Drug: Marijuana. He reports that he does not drink alcohol. Family History: family history includes High Blood Pressure in his father; Mental Illness in his brother and mother; Other in his father. The patients home medications have been reviewed. Allergies: Patient has no known allergies. ---------------------------------------------------PHYSICAL EXAM--------------------------------------  Constitutional/General: Alert and oriented x3, well appearing, non toxic in NAD  Head: Normocephalic and atraumatic  Mouth: Oropharynx clear, handling secretions, no trismus  Neck: Supple, full ROM,  Pulmonary: Lungs clear to auscultation bilaterally, no wheezes, rales, or rhonchi. Not in respiratory distress  Cardiovascular:  Regular rate. Regular rhythm. No murmurs  Chest: no chest wall tenderness  Abdomen: Soft. Non tender. Non distended. No rebound, guarding, or rigidity. No pulsatile masses appreciated. Musculoskeletal: Moves all extremities x 4. Warm and well perfused, no clubbing, cyanosis, or edema. Capillary refill <3 seconds  Skin: warm and dry. No rashes. Neurologic: GCS 15, no gross focal neurologic deficits  Psych: Delusions present, no suicidal or homicidal ideation, positive visual hallucinations, no auditory hallucination    -------------------------------------------------- RESULTS -------------------------------------------------  I have personally reviewed all laboratory and imaging results for this patient. Results are listed below.      LABS:  Results for orders placed or performed during the hospital encounter of 03/30/21   COVID-19, Rapid   Result Value Ref Range    SARS-CoV-2, NAAT Not Detected Not Detected   CBC Auto Differential   Result Value Ref Range    WBC 10.3 4.5 - 11.5 E9/L    RBC 5.36 3.80 - 5.80 < 300ng/mL    PCP Screen, Urine NOT DETECTED Negative < 25 ng/mL    Methadone Screen, Urine NOT DETECTED Negative <300 ng/mL    Oxycodone Urine NOT DETECTED Negative <100 ng/mL    FENTANYL SCREEN, URINE NOT DETECTED Negative <1 ng/mL    Drug Screen Comment: see below    TSH without Reflex   Result Value Ref Range    TSH 0.885 0.270 - 4.200 uIU/mL   T4   Result Value Ref Range    T4, Total 7.5 4.5 - 11.7 mcg/dL   EKG 12 Lead   Result Value Ref Range    Ventricular Rate 85 BPM    Atrial Rate 85 BPM    P-R Interval 156 ms    QRS Duration 90 ms    Q-T Interval 368 ms    QTc Calculation (Bazett) 437 ms    P Axis 59 degrees    R Axis 97 degrees    T Axis 71 degrees       RADIOLOGY:  Interpreted by Radiologist.  [address] [place] [address]    (Results Pending)         EKG: This EKG is signed and interpreted by me. Normal sinus rhythm, rate of 85, no ST segment elevation or depression, ND interval 156, some QRS 90 MS, ,  Interpreted by me      ------------------------- NURSING NOTES AND VITALS REVIEWED ---------------------------   The nursing notes within the ED encounter and vital signs as below have been reviewed by myself. /82   Pulse 92   Temp 98.4 °F (36.9 °C) (Oral)   Resp 15   Ht 5' 8\" (1.727 m)   Wt 150 lb (68 kg)   SpO2 100%   BMI 22.81 kg/m²   Oxygen Saturation Interpretation: Normal    The patients available past medical records and past encounters were reviewed.         ------------------------------ ED COURSE/MEDICAL DECISION MAKING----------------------  Medications   acetaminophen (TYLENOL) tablet 650 mg (has no administration in time range)   magnesium hydroxide (MILK OF MAGNESIA) 400 MG/5ML suspension 30 mL (has no administration in time range)   aluminum & magnesium hydroxide-simethicone (MAALOX) 200-200-20 MG/5ML suspension 30 mL (has no administration in time range)   hydrOXYzine (VISTARIL) capsule 50 mg (has no administration in time range)   haloperidol lactate (HALDOL) injection 5 mg (has no administration in time range)     Or   haloperidol (HALDOL) tablet 5 mg (has no administration in time range)   traZODone (DESYREL) tablet 50 mg (50 mg Oral Given 3/30/21 2148)   nicotine polacrilex (NICORETTE) gum 4 mg (has no administration in time range)   ARIPiprazole (ABILIFY) tablet 20 mg (has no administration in time range)   mirtazapine (REMERON) tablet 15 mg (has no administration in time range)   divalproex (DEPAKOTE) DR tablet 250 mg (has no administration in time range)             Medical Decision Making:   I, Dr. Chelsy Diop am the primary physician of record. Lorena Ceron is a 27 y.o. male who presents to the ED for hallucinations. Patient not suicidal or homicidal.  Patient had CBC, CMP, serum drug and urine drug which were fairly unremarkable aside positive for cannabis. The patient was medically cleared. Social work consultation. Patient will be admitted to psychiatry. Re-Evaluations/Consultations: The patient is medically cleared. This patient's ED course included: History, physical examination, reevaluation prior to disposition, labs, social work consultation      This patient has remained hemodynamically stable during their ED course. Counseling: The emergency provider has spoken with the patient and discussed todays results, in addition to providing specific details for the plan of care and counseling regarding the diagnosis and prognosis. Questions are answered at this time and they are agreeable with the plan.       --------------------------------- IMPRESSION AND DISPOSITION ---------------------------------    IMPRESSION  1. Acute psychosis ([facility] [address]        DISPOSITION  Disposition: Admit to mental health unit - medically cleared for admission  Patient condition is stable        NOTE: This report was transcribed using voice recognition software.  Every effort was made to ensure accuracy; however, inadvertent computerized transcription errors may be present         Linette Blue DO  03/31/21 1119

## 2021-03-30 NOTE — ED NOTES
Patient stated that he had purchased a healing crystal from [facility] and had used it last night. Pt stated that he had thought that the crystals possibly had gone in his finger and when looked up side effects he had seen that if finger pricked to deep, a person could have shortness of breath,pins and needles all through out body.      Haily Schuster RN  03/30/21 7750

## 2021-03-30 NOTE — ED NOTES
ASSIGNED [id number]  [facility] [facility]  03/30/21 0279

## 2021-03-31 ENCOUNTER — APPOINTMENT (OUTPATIENT)
Dept: CT IMAGING | Age: 26
DRG: 753 | End: 2021-03-31
Payer: COMMERCIAL

## 2021-03-31 PROBLEM — F31.2 SEVERE MANIC BIPOLAR 1 DISORDER WITH PSYCHOTIC BEHAVIOR (HCC): Status: ACTIVE | Noted: 2021-03-31

## 2021-03-31 PROBLEM — F31.63 SEVERE MIXED BIPOLAR 1 DISORDER WITHOUT PSYCHOSIS (HCC): Status: ACTIVE | Noted: 2021-03-31

## 2021-03-31 PROCEDURE — 99222 1ST HOSP IP/OBS MODERATE 55: CPT | Performed by: NURSE PRACTITIONER

## 2021-03-31 PROCEDURE — 6370000000 HC RX 637 (ALT 250 FOR IP): Performed by: PSYCHIATRY & NEUROLOGY

## 2021-03-31 PROCEDURE — 70450 CT HEAD/BRAIN W/O DYE: CPT

## 2021-03-31 PROCEDURE — 6370000000 HC RX 637 (ALT 250 FOR IP): Performed by: NURSE PRACTITIONER

## 2021-03-31 PROCEDURE — 1240000000 HC EMOTIONAL WELLNESS R&B

## 2021-03-31 RX ORDER — MIRTAZAPINE 15 MG/1
15 TABLET, FILM COATED ORAL NIGHTLY
Status: DISCONTINUED | OUTPATIENT
Start: 2021-03-31 | End: 2021-04-05 | Stop reason: HOSPADM

## 2021-03-31 RX ORDER — DIVALPROEX SODIUM 250 MG/1
250 TABLET, DELAYED RELEASE ORAL EVERY 12 HOURS SCHEDULED
Status: DISCONTINUED | OUTPATIENT
Start: 2021-03-31 | End: 2021-04-05 | Stop reason: HOSPADM

## 2021-03-31 RX ORDER — ARIPIPRAZOLE 10 MG/1
20 TABLET ORAL DAILY
Status: DISCONTINUED | OUTPATIENT
Start: 2021-03-31 | End: 2021-04-05 | Stop reason: HOSPADM

## 2021-03-31 RX ADMIN — TRAZODONE HYDROCHLORIDE 50 MG: 50 TABLET ORAL at 20:43

## 2021-03-31 RX ADMIN — MIRTAZAPINE 15 MG: 15 TABLET, FILM COATED ORAL at 20:43

## 2021-03-31 RX ADMIN — ARIPIPRAZOLE 20 MG: 10 TABLET ORAL at 12:07

## 2021-03-31 RX ADMIN — DIVALPROEX SODIUM 250 MG: 250 TABLET, DELAYED RELEASE ORAL at 12:07

## 2021-03-31 RX ADMIN — DIVALPROEX SODIUM 250 MG: 250 TABLET, DELAYED RELEASE ORAL at 20:43

## 2021-03-31 ASSESSMENT — PAIN SCALES - GENERAL: PAINLEVEL_OUTOF10: 0

## 2021-03-31 ASSESSMENT — LIFESTYLE VARIABLES: HISTORY_ALCOHOL_USE: NO

## 2021-03-31 ASSESSMENT — PATIENT HEALTH QUESTIONNAIRE - PHQ9: SUM OF ALL RESPONSES TO PHQ QUESTIONS 1-9: 3

## 2021-03-31 ASSESSMENT — SLEEP AND FATIGUE QUESTIONNAIRES
DO YOU HAVE DIFFICULTY SLEEPING: YES
DIFFICULTY STAYING ASLEEP: YES
DIFFICULTY ARISING: NO

## 2021-03-31 NOTE — PROGRESS NOTES
Attended afternoon meet and greet. Updated on staffing changes and evening expectations. Participated in movie trivia. Patient 1 of 8 in attendance.
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
## 2021-03-31 NOTE — GROUP NOTE
Group Therapy Note    Date: 3/31/2021    Group Start Time: 1120  Group End Time: 1200  Group Topic: Cognitive Skills    [facility]    DOROTHY Herrera        Group Therapy Note    Attendees: 14       Patient's Goal: Pt will be able to identify what active listening is, discuss its importance, and demonstrate adequate ability to make a reflective listening response. Notes: Pt was an active participant in group. Status After Intervention:  Improved    Participation Level:  Active Listener and Interactive    Participation Quality: Appropriate, Attentive and Supportive      Speech:  normal      Thought Process/Content: Logical      Affective Functioning: Flat      Mood: depressed      Level of consciousness:  Alert, Oriented x4 and Attentive      Response to Learning: Able to verbalize current knowledge/experience, Able to verbalize/acknowledge new learning and Progressing to goal      Endings: None Reported    Modes of Intervention: Education, Support, Socialization and Problem-solving      Discipline Responsible: /Counselor      Signature:  DOROTHY Harris
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
## 2021-03-31 NOTE — PROGRESS NOTES
Concerned about the result of his Ct scan . Pointed to front of head and stated \"he had a small headache\" obsessive somatic ideations. Encouraged to stop use of MJ  And informed can make him paranoid. States he knows that he needs to cut down. States he gets Mj from his father. Gives his SSI check to family and father uses MJ and gives him  Some.

## 2021-03-31 NOTE — PROGRESS NOTES
PT. IS UP ON UNIT, MOOD ANXIOUS. PT. DENIES SUICIDAL IDEATIONS, HOMICIDAL IDEATIONS AND HALLUCINATIONS. PT. VOICED NO DELUSIONS AT THIS TIME. MOOD ANXIOUS, BUT IS PLEASANT WITH NO VOICED COMPLAINTS AT PRESENT OTHER THAN CONCERN THAT PHARMACY DID NOT SEND REMERON OR TRAZODONE TO PT.'S HOME. PT. REPORTS POOR SLEEP PRIOR TO ADMIT R/T SAME. GROUPS AND MEDICATION COMPLIANCE ENCOURAGED.

## 2021-03-31 NOTE — PLAN OF CARE
Problem: Altered Mood, Manic Behavior:  Goal: Able to sleep  Description: Able to sleep  Outcome: Ongoing     Problem: Altered Mood, Manic Behavior:  Goal: Able to verbalize decrease in frequency and intensity of racing thoughts  Description: Able to verbalize decrease in frequency and intensity of racing thoughts  3/31/2021 1654 by Tammi Campbell RN  Outcome: Ongoing     Problem: Altered Mood, Manic Behavior:  Goal: Ability to disclose and discuss suicidal ideas will improve  Description: Ability to disclose and discuss suicidal ideas will improve  3/31/2021 1654 by Tammi Campbell RN  Outcome: Met This Shift     Problem: Altered Mood, Psychotic Behavior:  Goal: Able to demonstrate trust by eating, participating in treatment and following staff's direction  Description: Able to demonstrate trust by eating, participating in treatment and following staff's direction  Outcome: Met This Shift     Problem: Altered Mood, Psychotic Behavior:  Goal: Able to verbalize decrease in frequency and intensity of hallucinations  Description: Able to verbalize decrease in frequency and intensity of hallucinations  Outcome: Met This Shift     Problem: Altered Mood, Psychotic Behavior:  Goal: Able to verbalize reality based thinking  Description: Able to verbalize reality based thinking  Outcome: Ongoing     Problem: Altered Mood, Psychotic Behavior:  Goal: Able to verbalize reality based thinking  Description: Able to verbalize reality based thinking  Outcome: Ongoing     Problem: Altered Mood, Psychotic Behavior:  Goal: Ability to achieve adequate nutritional intake will improve  Description: Ability to achieve adequate nutritional intake will improve  Outcome: Met This Shift

## 2021-03-31 NOTE — PLAN OF CARE
Problem: Altered Mood, Manic Behavior:  Goal: Able to verbalize decrease in frequency and intensity of racing thoughts  Description: Able to verbalize decrease in frequency and intensity of racing thoughts  Outcome: Ongoing  THOUGHTS REMAIN RACING, SPEECH PRESSURED. Goal: Ability to disclose and discuss suicidal ideas will improve  Description: Ability to disclose and discuss suicidal ideas will improve  Outcome: Met This Shift  PT. DENIES ACTIVE SUICIDAL IDEATIONS AT THIS TIME.

## 2021-04-01 PROCEDURE — 6370000000 HC RX 637 (ALT 250 FOR IP): Performed by: NURSE PRACTITIONER

## 2021-04-01 PROCEDURE — 1240000000 HC EMOTIONAL WELLNESS R&B

## 2021-04-01 PROCEDURE — 6370000000 HC RX 637 (ALT 250 FOR IP): Performed by: PSYCHIATRY & NEUROLOGY

## 2021-04-01 PROCEDURE — 99232 SBSQ HOSP IP/OBS MODERATE 35: CPT | Performed by: NURSE PRACTITIONER

## 2021-04-01 RX ADMIN — DIVALPROEX SODIUM 250 MG: 250 TABLET, DELAYED RELEASE ORAL at 20:27

## 2021-04-01 RX ADMIN — NICOTINE POLACRILEX 4 MG: 2 GUM, CHEWING BUCCAL at 18:08

## 2021-04-01 RX ADMIN — DIVALPROEX SODIUM 250 MG: 250 TABLET, DELAYED RELEASE ORAL at 09:38

## 2021-04-01 RX ADMIN — NICOTINE POLACRILEX 4 MG: 2 GUM, CHEWING BUCCAL at 09:39

## 2021-04-01 RX ADMIN — TRAZODONE HYDROCHLORIDE 50 MG: 50 TABLET ORAL at 20:27

## 2021-04-01 RX ADMIN — MIRTAZAPINE 15 MG: 15 TABLET, FILM COATED ORAL at 20:27

## 2021-04-01 RX ADMIN — ARIPIPRAZOLE 20 MG: 10 TABLET ORAL at 09:39

## 2021-04-01 ASSESSMENT — PAIN SCALES - GENERAL: PAINLEVEL_OUTOF10: 0

## 2021-04-01 NOTE — PROGRESS NOTES
Spiritual Support Group Note    Number of Participants in Group:    10                    Time:   2    Goal: Relief from isolation and loneliness             Luz Maria Sharing             Self-understanding and gain insight              Acceptance and belonging            Recognize they are not alone                Socialization             Empowerment       Encouragement    Topic:  [x] Spiritual Wellness and Self Care                  [x] Hope                     [] Connecting with Divine/Others        [] Thankfulness and Gratitude               [x]  Meaningfulness and Purpose               [] Forgiveness               [] Peace               [] Connect to [employer]      [] Other    Participation Level:   [x] Active Listener   [] Minimal   [] Monopolizing   [] Interactive   [] No Participation   []  Other:     Attention:   [x] Alert   [] Distractible   [] Drowsy   [] Poor   [] Other:    Manner:   [x] Cooperative   [] Suspicious   [] Withdrawn   [] Guarded   [] Irritable   [] Inhospitable   [] Other:     Others Comments from Group:

## 2021-04-01 NOTE — GROUP NOTE
Group Therapy Note    Date: 4/1/2021    Group Start Time: 0100  Group End Time: 0200  Group Topic: Cognitive Skills    [facility]    PACHECO Medina, [facility]    Number of participants: 14  Type of group: Cognitive Skills  Mode of intervention: Education, Support, Socialization, Exploration, Clarifying, and Problem-solving  Topic: Cognitive distortions  Objective: To improve communication Skills    Group Therapy Note           Notes: Pt was an active participant in cognitive skills group focusing on communication skills and improving interpersonal relationships. Pt was able to share personal information and provide supportive feedback to group members. Status After Intervention:  Improved    Participation Level:  Active Listener and Interactive    Participation Quality: Appropriate, Attentive, Sharing and Supportive      Speech:  normal      Thought Process/Content: Logical      Affective Functioning: Congruent      Mood: euthymic      Level of consciousness:  Alert, Oriented x4 and Attentive      Response to Learning: Progressing to goal      Endings: None Reported    Modes of Intervention: Education, Support, Socialization, Exploration, Clarifying and Problem-solving      Discipline Responsible: /Counselor      Signature:  PACHECO Medina, [place]

## 2021-04-01 NOTE — GROUP NOTE
Group Therapy Note    Date: 4/1/2021    Group Start Time: 0945  Group End Time: 1100  Group Topic: Psychoeducation    [facility]    Akua Hoffman, CTRS        Group Therapy Note    Pt attended and participated in the student's group on self esteem/mindfulness/color therapy. Pt able to share 1 way on how to implement each topic in recovery. Pt was 1 out of 12 in attendance.

## 2021-04-01 NOTE — PROGRESS NOTES
Out on unit pleasant upon approach no c/o or voiced somatic delusions WEI done for Father emotional support given

## 2021-04-01 NOTE — PROGRESS NOTES
BEHAVIORAL HEALTH FOLLOW-UP NOTE     4/1/2021     Patient was seen and examined in person, Chart reviewed   Patient's case discussed with staff/team    Chief Complaint: \" Everything is okay\"    Interim History: Patient is up on the unit attending groups. He seems guarded and suspicious. Although he denies all symptoms. He was relieved to know that the CT of his head was negative. He is medication compliant denies SI/HI intent or plan denies any auditory visual hallucinations he is not voicing any delusions today.       Appetite:   [x] Normal/Unchanged  [] Increased  [] Decreased      Sleep:       [x] Normal/Unchanged  [] Fair       [] Poor              Energy:    [x] Normal/Unchanged  [] Increased  [] Decreased        SI [] Present  [x] Absent    HI  []Present  [x] Absent     Aggression:  [] yes  [x] no    Patient is [x] able  [] unable to CONTRACT FOR SAFETY     PAST MEDICAL/PSYCHIATRIC HISTORY:   Past Medical History:   Diagnosis Date    ADD (attention deficit disorder with hyperactivity)     Anxiety     Cerebral hemorrhage (HCC)     Cerebral palsy (HCC)     Depression     Schizophrenia (HCC)        FAMILY/SOCIAL HISTORY:  Family History   Problem Relation Age of Onset    Mental Illness Mother     High Blood Pressure Father     Other Father     Mental Illness Brother      Social History     Socioeconomic History    Marital status: Single     Spouse name: Not on file    Number of children: 0    Years of education: 15    Highest education level: Not on file   Occupational History    Occupation: STUDENT     Comment: 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   Social Needs    Financial resource strain: Not on file    Food insecurity     Worry: Not on file     Inability: Not on file   [employer] needs     Medical: Not on file     Non-medical: Not on file   Tobacco Use    Smoking status: Current Every Day Smoker     Packs/day: 1.00     Types: Cigarettes    Smokeless tobacco: Former User Substance and Sexual Activity    Alcohol use: No    Drug use: Yes     Frequency: 7.0 times per week     Types: Marijuana    Sexual activity: Never   Lifestyle    Physical activity     Days per week: Not on file     Minutes per session: Not on file    Stress: Not on file   Relationships    Social connections     Talks on phone: Not on file     Gets together: Not on file     Attends [employer] service: Not on file     Active member of club or organization: Not on file     Attends meetings of clubs or organizations: Not on file     Relationship status: Not on file    Intimate partner violence     Fear of current or ex partner: Not on file     Emotionally abused: Not on file     Physically abused: Not on file     Forced sexual activity: Not on file   Other Topics Concern    Not on file   Social History Narrative    Not on file           ROS:  [x] All negative/unchanged except if checked.  Explain positive(checked items) below:  [] Constitutional  [] Eyes  [] Ear/Nose/Mouth/Throat  [] Respiratory  [] CV  [] GI  []   [] Musculoskeletal  [] Skin/Breast  [] Neurological  [] Endocrine  [] Heme/Lymph  [] Allergic/Immunologic    Explanation:     MEDICATIONS:    Current Facility-Administered Medications:     ARIPiprazole (ABILIFY) tablet 20 mg, 20 mg, Oral, Daily, Gabrielle Loveless Dellick, APRN - CNP, 20 mg at 03/31/21 1207    mirtazapine (REMERON) tablet 15 mg, 15 mg, Oral, Nightly, Gabrielle Loveless Dellick, APRN - CNP, 15 mg at 03/31/21 2043    divalproex (DEPAKOTE) DR tablet 250 mg, 250 mg, Oral, 2 times per day, [facility], APRN - CNP, 623 mg at 03/31/21 2043    acetaminophen (TYLENOL) tablet 650 mg, 650 mg, Oral, Q6H PRN, Ana Flores MD    magnesium hydroxide (MILK OF MAGNESIA) 400 MG/5ML suspension 30 mL, 30 mL, Oral, Daily PRN, Ana Flores MD    aluminum & magnesium hydroxide-simethicone (MAALOX) 200-200-20 MG/5ML suspension 30 mL, 30 mL, Oral, PRN, Ana Flores MD    hydrOXYzine (VISTARIL) capsule 50 mg, 50 mg, Oral, TID PRN, Arturo White MD    haloperidol lactate (HALDOL) injection 5 mg, 5 mg, Intramuscular, Q6H PRN **OR** haloperidol (HALDOL) tablet 5 mg, 5 mg, Oral, Q6H PRN, Arturo White MD    traZODone (DESYREL) tablet 50 mg, 50 mg, Oral, Nightly PRN, Artruo White MD, 50 mg at 03/31/21 2043    nicotine polacrilex (NICORETTE) gum 4 mg, 4 mg, Oral, PRN, Marc Snider, APRN - CNP      Examination:  /74   Pulse 84   Temp 98.3 °F (36.8 °C) (Temporal)   Resp 14   Ht 5' 8\" (1.727 m)   Wt 150 lb (68 kg)   SpO2 95%   BMI 22.81 kg/m²   Gait - steady  Medication side effects(SE): Denies    Mental Status Examination:    Level of consciousness:  within normal limits   Appearance:  fair grooming and fair hygiene  Behavior/Motor:  no abnormalities noted  Attitude toward examiner:  cooperative  Speech:  spontaneous, normal rate and normal volume   Mood: \" I am feeling better. \"  Affect: Guarded  Thought processes: Linear without flight of ideas loose associations  Thought content: Devoid any auditory visualizations delusions or perceptual normalities. Denies SI/HI intent or plan  Cognition:  oriented to person, place, and time   Concentration intact  Insight fair   Judgement fair     ASSESSMENT:   Patient symptoms are:  [] Well controlled  [x] Improving  [] Worsening  [] No change      Diagnosis:   Principal Problem:    Severe manic bipolar 1 disorder with psychotic behavior ([facility] [address]  Active Problems:    Cannabis use disorder, severe, dependence ([facility] [address]  Resolved Problems:    * No resolved hospital problems.  *      LABS:    Recent Labs     03/30/21  1416   WBC 10.3   HGB 17.0*        Recent Labs     03/30/21  1417      K 4.0      CO2 27   BUN 7   CREATININE 1.0   GLUCOSE 92     Recent Labs     03/30/21  1417   BILITOT 0.5   ALKPHOS 64   AST 28   ALT 39     Lab Results   Component Value Date    LABAMPH NOT DETECTED 03/30/2021    LABAMPH NOT DETECTED 06/25/2012    BARBSCNU NOT DETECTED 03/30/2021    LABBENZ NOT DETECTED 03/30/2021    LABBENZ SEE BELOW 04/28/2014    CANNAB POSITIVE  06/25/2012    LABMETH NOT DETECTED 03/30/2021    OPIATESCREENURINE NOT DETECTED 03/30/2021    PHENCYCLIDINESCREENURINE NOT DETECTED 03/30/2021    ETOH <10 03/30/2021     Lab Results   Component Value Date    TSH 0.885 03/30/2021     Lab Results   Component Value Date    LITHIUM 0.19 (L) 06/30/2014     Lab Results   Component Value Date    VALPROATE 86 04/28/2014           Treatment Plan:  Reviewed current Medications with the patient. Risks, benefits, side effects, drug-to-drug interactions and alternatives to treatment were discussed. Collateral information:   CD evaluation  Encourage patient to attend group and other milieu activities.   Discharge planning discussed with the patient and treatment team.    Continue Depakote 250 mg twice daily for mood stabilization  Continue Abilify 20 mg daily for psychosis  Continue Remeron 15 mg at bedtime for depression and sleep    We will plan for the Abilify maintain injection    PSYCHOTHERAPY/COUNSELING:  [x] Therapeutic interview  [x] Supportive  [] CBT  [] Ongoing  [] Other    [x] Patient continues to need, on a daily basis, active treatment furnished directly by or requiring the supervision of inpatient psychiatric personnel      Anticipated Length of stay: 3 to 5 days based on stability            Electronically signed by ELAINE Haines CNP on 8/0/0166 at 9:03 AM

## 2021-04-01 NOTE — PROGRESS NOTES
Attended afternoon meet and greet. Patient pleasant and engaged in dice trivia. Students facilitated group. Patient 1 of 13 in attendance .

## 2021-04-01 NOTE — PLAN OF CARE
Patient is pleasant and cooperative. Patient is no behavioral issues. Patient makes needs known and presents in better control. Patient does present anxious. Concentration is impaired, as is judgement and insight. Patient is free from delusional behaviors and paranoia at this time. Denies SI, HI, and AVH.  Will monitor closely

## 2021-04-02 PROCEDURE — 1240000000 HC EMOTIONAL WELLNESS R&B

## 2021-04-02 PROCEDURE — 6370000000 HC RX 637 (ALT 250 FOR IP): Performed by: PSYCHIATRY & NEUROLOGY

## 2021-04-02 PROCEDURE — 6370000000 HC RX 637 (ALT 250 FOR IP): Performed by: NURSE PRACTITIONER

## 2021-04-02 PROCEDURE — 99232 SBSQ HOSP IP/OBS MODERATE 35: CPT | Performed by: NURSE PRACTITIONER

## 2021-04-02 RX ADMIN — MIRTAZAPINE 15 MG: 15 TABLET, FILM COATED ORAL at 21:04

## 2021-04-02 RX ADMIN — DIVALPROEX SODIUM 250 MG: 250 TABLET, DELAYED RELEASE ORAL at 08:45

## 2021-04-02 RX ADMIN — TRAZODONE HYDROCHLORIDE 50 MG: 50 TABLET ORAL at 21:04

## 2021-04-02 RX ADMIN — ARIPIPRAZOLE 20 MG: 10 TABLET ORAL at 08:45

## 2021-04-02 RX ADMIN — DIVALPROEX SODIUM 250 MG: 250 TABLET, DELAYED RELEASE ORAL at 21:04

## 2021-04-02 RX ADMIN — NICOTINE POLACRILEX 4 MG: 2 GUM, CHEWING BUCCAL at 08:45

## 2021-04-02 ASSESSMENT — PAIN SCALES - GENERAL: PAINLEVEL_OUTOF10: 0

## 2021-04-02 NOTE — PROGRESS NOTES
[address] [place] [address]  Day 3 Interdisciplinary Treatment Plan NOTE    Review Date & Time: 4/2/21 0900    Patient was in treatment team    Admission Type:   Admission Type: Involuntary    Reason for admission:  Reason for Admission: \"Physical problem, wanted to get checked out and go home. But they heard I was schizophrenic and now I'm up here. \"  Estimated Length of Stay Update:  1-3 days  Estimated Discharge Date Update: 4/5/21    PATIENT STRENGTHS:  Patient Strengths Strengths: Communication, Medication Compliance, No significant Physical Illness  Patient Strengths and Limitations:Limitations: Tendency to isolate self, Difficulty problem solving/relies on others to help solve problems  Addictive Behavior:Addictive Behavior  In the past 3 months, have you felt or has someone told you that you have a problem with:  : Excessive Fluid intake(energy drinks)  Do you have a history of Chemical Use?: No  Do you have a history of Alcohol Use?: No  Do you have a history of Street Drug Abuse?: Yes  Histroy of Prescripton Drug Abuse?: No  Medical Problems:  Past Medical History:   Diagnosis Date    ADD (attention deficit disorder with hyperactivity)     Anxiety     Cerebral hemorrhage (HCC)     Cerebral palsy ([facility] [address]     Depression     Schizophrenia ([facility] [address]        Risk:  Fall RiskTotal: 73  Jamison Scale Jamison Scale Score: 22  BVC Total: 0  Change in scores no.  Changes to plan of Care  No     Status EXAM:   Status and Exam  Normal: No  Facial Expression: Worried  Affect: Congruent  Level of Consciousness: Alert  Mood:Normal: No  Mood: Anxious  Motor Activity:Normal: Yes  Motor Activity: Decreased  Interview Behavior: Cooperative  Preception: [place] to Person, [place] to Time, [place] to Situation, [place] to Place  Attention:Normal: No  Attention: Others(See comment)(impaired concentration)  Thought Processes: Other(See comment)(improving)  Thought Content:Normal: No  Thought Content: Other(See Comment)(impaired frame for Short-Term Goals: daily      Luis Felipe Gibbs RN

## 2021-04-02 NOTE — PLAN OF CARE
Patient is pleasant and cooperative, patient is medication compliant and presents in good control. Patient makes needs known. Patient does present with impaired concentration but with additional education and explanation patient is able to grasp education. Patient anxiety is improving. Depression is decreased. Patient is free from paranoia and delusional beliefs. Patient insight and judgement are improving. Will monitor closely.

## 2021-04-02 NOTE — PROGRESS NOTES
BEHAVIORAL HEALTH FOLLOW-UP NOTE     4/2/2021     Patient was seen and examined in person, Chart reviewed   Patient's case discussed with staff/team    Chief Complaint: \" Everything is okay\"    Interim History: Patient seen during treatment team.  He states the medications are working well for him. He denies SI/HI intent or plan denies any auditory visual hallucinations. She has limited insight and judgment to hospitalization need for treatment. He appears guarded and somewhat paranoid. He does attend groups he tends to keep to himself on the unit.     Appetite:   [x] Normal/Unchanged  [] Increased  [] Decreased      Sleep:       [x] Normal/Unchanged  [] Fair       [] Poor              Energy:    [x] Normal/Unchanged  [] Increased  [] Decreased        SI [] Present  [x] Absent    HI  []Present  [x] Absent     Aggression:  [] yes  [x] no    Patient is [x] able  [] unable to CONTRACT FOR SAFETY     PAST MEDICAL/PSYCHIATRIC HISTORY:   Past Medical History:   Diagnosis Date    ADD (attention deficit disorder with hyperactivity)     Anxiety     Cerebral hemorrhage (HCC)     Cerebral palsy (HCC)     Depression     Schizophrenia (HCC)        FAMILY/SOCIAL HISTORY:  Family History   Problem Relation Age of Onset    Mental Illness Mother     High Blood Pressure Father     Other Father     Mental Illness Brother      Social History     Socioeconomic History    Marital status: Single     Spouse name: Not on file    Number of children: 0    Years of education: 15    Highest education level: Not on file   Occupational History    Occupation: STUDENT     Comment: 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   Social Needs    Financial resource strain: Not on file    Food insecurity     Worry: Not on file     Inability: Not on file   [employer] needs     Medical: Not on file     Non-medical: Not on file   Tobacco Use    Smoking status: Current Every Day Smoker     Packs/day: 1.00     Types: Cigarettes    hydrOXYzine (VISTARIL) capsule 50 mg, 50 mg, Oral, TID PRN, Lyric Bailey MD    haloperidol lactate (HALDOL) injection 5 mg, 5 mg, Intramuscular, Q6H PRN **OR** haloperidol (HALDOL) tablet 5 mg, 5 mg, Oral, Q6H PRN, Lyric Bailey MD    traZODone (DESYREL) tablet 50 mg, 50 mg, Oral, Nightly PRN, Lyric Bailey MD, 50 mg at 04/01/21 2027    nicotine polacrilex (NICORETTE) gum 4 mg, 4 mg, Oral, PRN, Laura Pool APRN - CNP, 4 mg at 04/02/21 0845      Examination:  BP (!) 136/59   Pulse 70   Temp 98.4 °F (36.9 °C) (Temporal)   Resp 13   Ht 5' 8\" (1.727 m)   Wt 150 lb (68 kg)   SpO2 95%   BMI 22.81 kg/m²   Gait - steady  Medication side effects(SE): Denies    Mental Status Examination:    Level of consciousness:  within normal limits   Appearance:  fair grooming and fair hygiene  Behavior/Motor:  no abnormalities noted  Attitude toward examiner:  cooperative  Speech:  spontaneous, normal rate and normal volume   Mood: \" I am feeling better. \"  Affect: Guarded  Thought processes: Linear without flight of ideas loose associations  Thought content: Devoid any auditory visualizations delusions or perceptual normalities. Denies SI/HI intent or plan  Cognition:  oriented to person, place, and time   Concentration intact  Insight fair   Judgement fair     ASSESSMENT:   Patient symptoms are:  [] Well controlled  [x] Improving  [] Worsening  [] No change      Diagnosis:   Principal Problem:    Severe manic bipolar 1 disorder with psychotic behavior ([facility] [address]  Active Problems:    Cannabis use disorder, severe, dependence ([facility] [address]  Resolved Problems:    * No resolved hospital problems.  *      LABS:    Recent Labs     03/30/21  1416   WBC 10.3   HGB 17.0*        Recent Labs     03/30/21  1417      K 4.0      CO2 27   BUN 7   CREATININE 1.0   GLUCOSE 92     Recent Labs     03/30/21  1417   BILITOT 0.5   ALKPHOS 64   AST 28   ALT 39     Lab Results   Component Value Date    LABAMPH NOT DETECTED 03/30/2021    LABAMPH NOT DETECTED 06/25/2012    BARBSCNU NOT DETECTED 03/30/2021    LABBENZ NOT DETECTED 03/30/2021    LABBENZ SEE BELOW 04/28/2014    CANNAB POSITIVE  06/25/2012    LABMETH NOT DETECTED 03/30/2021    OPIATESCREENURINE NOT DETECTED 03/30/2021    PHENCYCLIDINESCREENURINE NOT DETECTED 03/30/2021    ETOH <10 03/30/2021     Lab Results   Component Value Date    TSH 0.885 03/30/2021     Lab Results   Component Value Date    LITHIUM 0.19 (L) 06/30/2014     Lab Results   Component Value Date    VALPROATE 86 04/28/2014           Treatment Plan:  Reviewed current Medications with the patient. Risks, benefits, side effects, drug-to-drug interactions and alternatives to treatment were discussed. Collateral information:   CD evaluation  Encourage patient to attend group and other milieu activities.   Discharge planning discussed with the patient and treatment team.    Continue Depakote 250 mg twice daily for mood stabilization  Continue Abilify 20 mg daily for psychosis  Continue Remeron 15 mg at bedtime for depression and sleep    We will plan for the Abilify maintain injection    PSYCHOTHERAPY/COUNSELING:  [x] Therapeutic interview  [x] Supportive  [] CBT  [] Ongoing  [] Other    [x] Patient continues to need, on a daily basis, active treatment furnished directly by or requiring the supervision of inpatient psychiatric personnel      Anticipated Length of stay: 3 to 5 days based on stability            Electronically signed by ELAINE Nair CNP on 6/0/4297 at 9:04 AM

## 2021-04-02 NOTE — GROUP NOTE
Group Therapy Note    Date: 4/2/2021    Group Start Time: 1000  Group End Time: 2475  Group Topic: Psychoeducation    [facility]    Akua Hoffman, [facility] [place]        Group Therapy Note    Number of participants: 17  Type of group: Psychoeducation  Mode of intervention: Education, Support, Socialization, Exploration, Clarifying, and Problem-solving  Topic: Creating Your Own Happiness  Objective: Pt will identify physical, intellectual, emotional, and spiritual ways (PIES) to cultivate own happiness. Patient's Goal:  \"Keep progress going\"     Notes:  Pt was interactive during group sharing 1 way to utilize the PIES acronym in recovery. Pt gave support and feedback to others. Status After Intervention:  Improved    Participation Level:  Active Listener and Interactive    Participation Quality: Appropriate, Attentive, Sharing and Supportive      Speech:  normal      Thought Process/Content: Logical      Affective Functioning: Congruent      Mood: euthymic      Level of consciousness:  Alert, Oriented x4 and Attentive      Response to Learning: Able to verbalize current knowledge/experience, Able to verbalize/acknowledge new learning, Able to retain information, Capable of insight, Able to change behavior and Progressing to goal      Endings: None Reported    Modes of Intervention: Education, Support, Socialization, Exploration, Clarifying and Problem-solving

## 2021-04-02 NOTE — PROGRESS NOTES
Pt attended afternoon meet/greet and is aware of staff updates. Pt attended leisure group of apples to apples and or puzzles. Pt was 1 out of 10 in attendance.

## 2021-04-03 LAB — VALPROIC ACID LEVEL: 36 MCG/ML (ref 50–100)

## 2021-04-03 PROCEDURE — 6370000000 HC RX 637 (ALT 250 FOR IP): Performed by: NURSE PRACTITIONER

## 2021-04-03 PROCEDURE — 80164 ASSAY DIPROPYLACETIC ACD TOT: CPT

## 2021-04-03 PROCEDURE — 6370000000 HC RX 637 (ALT 250 FOR IP): Performed by: PSYCHIATRY & NEUROLOGY

## 2021-04-03 PROCEDURE — 99232 SBSQ HOSP IP/OBS MODERATE 35: CPT | Performed by: NURSE PRACTITIONER

## 2021-04-03 PROCEDURE — 1240000000 HC EMOTIONAL WELLNESS R&B

## 2021-04-03 PROCEDURE — 36415 COLL VENOUS BLD VENIPUNCTURE: CPT

## 2021-04-03 RX ADMIN — DIVALPROEX SODIUM 250 MG: 250 TABLET, DELAYED RELEASE ORAL at 21:06

## 2021-04-03 RX ADMIN — TRAZODONE HYDROCHLORIDE 50 MG: 50 TABLET ORAL at 21:06

## 2021-04-03 RX ADMIN — MIRTAZAPINE 15 MG: 15 TABLET, FILM COATED ORAL at 21:06

## 2021-04-03 RX ADMIN — NICOTINE POLACRILEX 4 MG: 2 GUM, CHEWING BUCCAL at 09:25

## 2021-04-03 RX ADMIN — ARIPIPRAZOLE 20 MG: 10 TABLET ORAL at 09:25

## 2021-04-03 RX ADMIN — DIVALPROEX SODIUM 250 MG: 250 TABLET, DELAYED RELEASE ORAL at 09:25

## 2021-04-03 ASSESSMENT — PAIN SCALES - GENERAL: PAINLEVEL_OUTOF10: 0

## 2021-04-03 NOTE — PROGRESS NOTES
BEHAVIORAL HEALTH FOLLOW-UP NOTE     4/3/2021     Patient was seen and examined in person, Chart reviewed   Patient's case discussed with staff/team    Chief Complaint: \"I am feeling better. \"  Interim History: Patient up on the unit states he is feeling better. He denies SI/HI intent or plan he denies any auditory visual hallucinations. He is agreeable to the Aristada injection. States he is eating well and sleeping well there are no neurovegetative signs of depression.   Denies any auditory visualizations no overt overt signs of psychosis      Appetite:   [x] Normal/Unchanged  [] Increased  [] Decreased      Sleep:       [x] Normal/Unchanged  [] Fair       [] Poor              Energy:    [x] Normal/Unchanged  [] Increased  [] Decreased        SI [] Present  [x] Absent    HI  []Present  [x] Absent     Aggression:  [] yes  [x] no    Patient is [x] able  [] unable to CONTRACT FOR SAFETY     PAST MEDICAL/PSYCHIATRIC HISTORY:   Past Medical History:   Diagnosis Date    ADD (attention deficit disorder with hyperactivity)     Anxiety     Cerebral hemorrhage (HCC)     Cerebral palsy (HCC)     Depression     Schizophrenia (HCC)        FAMILY/SOCIAL HISTORY:  Family History   Problem Relation Age of Onset    Mental Illness Mother     High Blood Pressure Father     Other Father     Mental Illness Brother      Social History     Socioeconomic History    Marital status: Single     Spouse name: Not on file    Number of children: 0    Years of education: 15    Highest education level: Not on file   Occupational History    Occupation: STUDENT     Comment: 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   Social Needs    Financial resource strain: Not on file    Food insecurity     Worry: Not on file     Inability: Not on file   [employer] needs     Medical: Not on file     Non-medical: Not on file   Tobacco Use    Smoking status: Current Every Day Smoker     Packs/day: 1.00     Types: Cigarettes    Smokeless tobacco: Former User   Substance and Sexual Activity    Alcohol use: No    Drug use: Yes     Frequency: 7.0 times per week     Types: Marijuana    Sexual activity: Never   Lifestyle    Physical activity     Days per week: Not on file     Minutes per session: Not on file    Stress: Not on file   Relationships    Social connections     Talks on phone: Not on file     Gets together: Not on file     Attends Pentecostal service: Not on file     Active member of club or organization: Not on file     Attends meetings of clubs or organizations: Not on file     Relationship status: Not on file    Intimate partner violence     Fear of current or ex partner: Not on file     Emotionally abused: Not on file     Physically abused: Not on file     Forced sexual activity: Not on file   Other Topics Concern    Not on file   Social History Narrative    Not on file           ROS:  [x] All negative/unchanged except if checked.  Explain positive(checked items) below:  [] Constitutional  [] Eyes  [] Ear/Nose/Mouth/Throat  [] Respiratory  [] CV  [] GI  []   [] Musculoskeletal  [] Skin/Breast  [] Neurological  [] Endocrine  [] Heme/Lymph  [] Allergic/Immunologic    Explanation:     MEDICATIONS:    Current Facility-Administered Medications:     ARIPiprazole (ABILIFY) tablet 20 mg, 20 mg, Oral, Daily, ELAINE Madsen CNP, 20 mg at 04/02/21 0845    mirtazapine (REMERON) tablet 15 mg, 15 mg, Oral, Nightly, ELAINE Madsen CNP, 15 mg at 04/02/21 2104    divalproex (DEPAKOTE) DR tablet 250 mg, 250 mg, Oral, 2 times per day, ELAINE Healy CNP, 302 mg at 04/02/21 2104    acetaminophen (TYLENOL) tablet 650 mg, 650 mg, Oral, Q6H PRN, Juliana Delgado MD    magnesium hydroxide (MILK OF MAGNESIA) 400 MG/5ML suspension 30 mL, 30 mL, Oral, Daily PRN, Juliana Delgado MD    aluminum & magnesium hydroxide-simethicone (MAALOX) 200-200-20 MG/5ML suspension 30 mL, 30 mL, Oral, PRN, Juliana Delgado MD    hydrOXYzine (VISTARIL) capsule 50 mg, 50 mg, Oral, TID PRN, Tammy Riedel, MD    haloperidol lactate (HALDOL) injection 5 mg, 5 mg, Intramuscular, Q6H PRN **OR** haloperidol (HALDOL) tablet 5 mg, 5 mg, Oral, Q6H PRN, Tammy Riedel, MD    traZODone (DESYREL) tablet 50 mg, 50 mg, Oral, Nightly PRN, Tammy Riedel, MD, 50 mg at 04/02/21 2104    nicotine polacrilex (NICORETTE) gum 4 mg, 4 mg, Oral, PRN, Ani Reddy APRN - CNP, 4 mg at 04/02/21 0845      Examination:  /71   Pulse 64   Temp 98.3 °F (36.8 °C)   Resp 14   Ht 5' 8\" (1.727 m)   Wt 150 lb (68 kg)   SpO2 95%   BMI 22.81 kg/m²   Gait - steady  Medication side effects(SE): Denies    Mental Status Examination:    Level of consciousness:  within normal limits   Appearance:  fair grooming and fair hygiene  Behavior/Motor:  no abnormalities noted  Attitude toward examiner:  cooperative  Speech:  spontaneous, normal rate and normal volume   Mood: \" I am feeling better. \"  Affect: Guarded  Thought processes: Linear without flight of ideas loose associations  Thought content: Devoid any auditory visualizations delusions or perceptual normalities. Denies SI/HI intent or plan  Cognition:  oriented to person, place, and time   Concentration intact  Insight fair   Judgement fair     ASSESSMENT:   Patient symptoms are:  [] Well controlled  [x] Improving  [] Worsening  [] No change      Diagnosis:   Principal Problem:    Severe manic bipolar 1 disorder with psychotic behavior ([facility] [address]  Active Problems:    Cannabis use disorder, severe, dependence ([facility] [address]  Resolved Problems:    * No resolved hospital problems. *      LABS:    No results for input(s): WBC, HGB, PLT in the last 72 hours. No results for input(s): NA, K, CL, CO2, BUN, CREATININE, GLUCOSE in the last 72 hours. No results for input(s): BILITOT, ALKPHOS, AST, ALT in the last 72 hours.   Lab Results   Component Value Date    LABAMPH NOT DETECTED 03/30/2021    [address] NOT DETECTED 06/25/2012    MERCEDEZ NOT DETECTED 03/30/2021    LABBENZ NOT DETECTED 03/30/2021    LABBENZ SEE BELOW 04/28/2014    CANNAB POSITIVE  06/25/2012    LABMETH NOT DETECTED 03/30/2021    OPIATESCREENURINE NOT DETECTED 03/30/2021    PHENCYCLIDINESCREENURINE NOT DETECTED 03/30/2021    ETOH <10 03/30/2021     Lab Results   Component Value Date    TSH 0.885 03/30/2021     Lab Results   Component Value Date    LITHIUM 0.19 (L) 06/30/2014     Lab Results   Component Value Date    VALPROATE 86 04/28/2014           Treatment Plan:  Reviewed current Medications with the patient. Risks, benefits, side effects, drug-to-drug interactions and alternatives to treatment were discussed. Collateral information:   CD evaluation  Encourage patient to attend group and other milieu activities.   Discharge planning discussed with the patient and treatment team.    Continue Depakote 250 mg twice daily for mood stabilization  Continue Abilify 20 mg daily for psychosis  Continue Remeron 15 mg at bedtime for depression and sleep  Give Aristada 662 mg IM every 30 days patient is agreeable        PSYCHOTHERAPY/COUNSELING:  [x] Therapeutic interview  [x] Supportive  [] CBT  [] Ongoing  [] Other    [x] Patient continues to need, on a daily basis, active treatment furnished directly by or requiring the supervision of inpatient psychiatric personnel      Anticipated Length of stay: 3 to 5 days based on stability            Electronically signed by ELAINE Healy CNP on 9/7/6329 at 9:01 AM

## 2021-04-03 NOTE — PLAN OF CARE
Patient resting quiet to self at this time, respirations are even and unlabored, no signs or symptoms of distress or discomfort. PRN medications given thus far this shift for sleep. Staff will continue to conduct environmental rounds to ensure the safety of everyone on the unit. Staff will provide support and interventions as requested or required.

## 2021-04-03 NOTE — BH NOTE
Pt denies suicidal or homicidal ideations. Pt denies hallucinations. Pt cooperative, pleasant. No other distress at this time. Will follow and monitor.

## 2021-04-03 NOTE — GROUP NOTE
Group Therapy Note    Date: 4/3/2021    Group Start Time: 1115  Group End Time: 1200  Group Topic: Cognitive Skills    [facility]    DOROTHY Herrera        Group Therapy Note    Attendees: 17         Patient's Goal:  Pt will be able to discuss importance of engaging in healthy coping skills and be able to identify at least 3 healthy coping skills. Notes:  Pt was active participant in class discussion and activity. Status After Intervention:  Improved    Participation Level:  Active Listener and Interactive    Participation Quality: Appropriate, Attentive, Sharing and Supportive      Speech:  normal      Thought Process/Content: Logical      Affective Functioning: Congruent      Mood: euthymic      Level of consciousness:  Alert, Oriented x4 and Attentive      Response to Learning: Able to verbalize current knowledge/experience, Able to verbalize/acknowledge new learning and Progressing to goal      Endings: None Reported    Modes of Intervention: Education, Support, Socialization, Problem-solving and Activity      Discipline Responsible: /Counselor      Signature:  DOROTHY Lai

## 2021-04-03 NOTE — PLAN OF CARE
Pt is stable and alert. Pt is peasant and has an appropriate affect at this time. Pt denies suicidal / homicidal ideations. Pt denies hallucinations. Pt has reported a goal of, \"To stay productive\" pr pt. Pt. Appears to have organized thought processes at this time. Will follow and monitor.

## 2021-04-03 NOTE — PROGRESS NOTES
Active and engaged during discussion on coping skills. Able to identify one skill to apply today. Attended afternoon leisure activity. Enjoyed game stations of Apples to apples, RAMBO, and Life. Pleasant and social with peers.

## 2021-04-04 PROCEDURE — 1240000000 HC EMOTIONAL WELLNESS R&B

## 2021-04-04 PROCEDURE — 6370000000 HC RX 637 (ALT 250 FOR IP): Performed by: PSYCHIATRY & NEUROLOGY

## 2021-04-04 PROCEDURE — 99232 SBSQ HOSP IP/OBS MODERATE 35: CPT | Performed by: NURSE PRACTITIONER

## 2021-04-04 PROCEDURE — 6370000000 HC RX 637 (ALT 250 FOR IP): Performed by: NURSE PRACTITIONER

## 2021-04-04 RX ADMIN — TRAZODONE HYDROCHLORIDE 50 MG: 50 TABLET ORAL at 20:38

## 2021-04-04 RX ADMIN — ARIPIPRAZOLE 20 MG: 10 TABLET ORAL at 08:20

## 2021-04-04 RX ADMIN — DIVALPROEX SODIUM 250 MG: 250 TABLET, DELAYED RELEASE ORAL at 20:38

## 2021-04-04 RX ADMIN — DIVALPROEX SODIUM 250 MG: 250 TABLET, DELAYED RELEASE ORAL at 08:21

## 2021-04-04 RX ADMIN — MIRTAZAPINE 15 MG: 15 TABLET, FILM COATED ORAL at 20:38

## 2021-04-04 NOTE — PROGRESS NOTES
status: Current Every Day Smoker     Packs/day: 1.00     Types: Cigarettes    Smokeless tobacco: Former User   Substance and Sexual Activity    Alcohol use: No    Drug use: Yes     Frequency: 7.0 times per week     Types: Marijuana    Sexual activity: Never   Lifestyle    Physical activity     Days per week: Not on file     Minutes per session: Not on file    Stress: Not on file   Relationships    Social connections     Talks on phone: Not on file     Gets together: Not on file     Attends Taoist service: Not on file     Active member of club or organization: Not on file     Attends meetings of clubs or organizations: Not on file     Relationship status: Not on file    Intimate partner violence     Fear of current or ex partner: Not on file     Emotionally abused: Not on file     Physically abused: Not on file     Forced sexual activity: Not on file   Other Topics Concern    Not on file   Social History Narrative    Not on file           ROS:  [x] All negative/unchanged except if checked.  Explain positive(checked items) below:  [] Constitutional  [] Eyes  [] Ear/Nose/Mouth/Throat  [] Respiratory  [] CV  [] GI  []   [] Musculoskeletal  [] Skin/Breast  [] Neurological  [] Endocrine  [] Heme/Lymph  [] Allergic/Immunologic    Explanation:     MEDICATIONS:    Current Facility-Administered Medications:     ARIPiprazole lauroxil (ARISTADA) injection 662 mg, 662 mg, Intramuscular, Q30 Days, ELAINE Madsen CNP, 955 mg at 04/03/21 1300    ARIPiprazole (ABILIFY) tablet 20 mg, 20 mg, Oral, Daily, ELAINE Elizabeth CNP, 20 mg at 04/04/21 0820    mirtazapine (REMERON) tablet 15 mg, 15 mg, Oral, Nightly, ELAINE Madsen CNP, 15 mg at 04/03/21 2106    divalproex (DEPAKOTE) DR tablet 250 mg, 250 mg, Oral, 2 times per day, ELAINE Bridges CNP, 736 mg at 04/04/21 8560    acetaminophen (TYLENOL) tablet 650 mg, 650 mg, Oral, Q6H PRN, Mark Loredo MD    magnesium hydroxide (MILK OF MAGNESIA) 400 MG/5ML suspension 30 mL, 30 mL, Oral, Daily PRN, Shruthi Fry MD    aluminum & magnesium hydroxide-simethicone (MAALOX) 200-200-20 MG/5ML suspension 30 mL, 30 mL, Oral, PRN, Shruthi Fry MD    hydrOXYzine (VISTARIL) capsule 50 mg, 50 mg, Oral, TID PRN, Shruthi Fry MD    haloperidol lactate (HALDOL) injection 5 mg, 5 mg, Intramuscular, Q6H PRN **OR** haloperidol (HALDOL) tablet 5 mg, 5 mg, Oral, Q6H PRN, Shruthi Fry MD    traZODone (DESYREL) tablet 50 mg, 50 mg, Oral, Nightly PRN, Shruthi Fry MD, 50 mg at 04/03/21 2106    nicotine polacrilex (NICORETTE) gum 4 mg, 4 mg, Oral, PRN, Arslan Cancel, APRN - CNP, 4 mg at 04/03/21 9284      Examination:  /77   Pulse 81   Temp 97.9 °F (36.6 °C)   Resp 15   Ht 5' 8\" (1.727 m)   Wt 150 lb (68 kg)   SpO2 98%   BMI 22.81 kg/m²   Gait - steady  Medication side effects(SE): Denies    Mental Status Examination:    Level of consciousness:  within normal limits   Appearance:  fair grooming and fair hygiene  Behavior/Motor:  no abnormalities noted  Attitude toward examiner:  cooperative  Speech:  spontaneous, normal rate and normal volume   Mood: \" I am feeling better. \"  Affect: Guarded  Thought processes: Linear without flight of ideas loose associations  Thought content: Devoid any auditory visualizations delusions or perceptual normalities. Denies SI/HI intent or plan  Cognition:  oriented to person, place, and time   Concentration intact  Insight fair   Judgement fair     ASSESSMENT:   Patient symptoms are:  [] Well controlled  [x] Improving  [] Worsening  [] No change      Diagnosis:   Principal Problem:    Severe manic bipolar 1 disorder with psychotic behavior ([facility] [address]  Active Problems:    Cannabis use disorder, severe, dependence ([facility] [address]  Resolved Problems:    * No resolved hospital problems. *      LABS:    No results for input(s): WBC, HGB, PLT in the last 72 hours.   No results for input(s): NA, K, CL, CO2, BUN, CREATININE, GLUCOSE in the last 72 hours. No results for input(s): BILITOT, ALKPHOS, AST, ALT in the last 72 hours. Lab Results   Component Value Date    LABAMPH NOT DETECTED 03/30/2021    LABAMPH NOT DETECTED 06/25/2012    BARBSCNU NOT DETECTED 03/30/2021    LABBENZ NOT DETECTED 03/30/2021    LABBENZ SEE BELOW 04/28/2014    CANNAB POSITIVE  06/25/2012    LABMETH NOT DETECTED 03/30/2021    OPIATESCREENURINE NOT DETECTED 03/30/2021    PHENCYCLIDINESCREENURINE NOT DETECTED 03/30/2021    ETOH <10 03/30/2021     Lab Results   Component Value Date    TSH 0.885 03/30/2021     Lab Results   Component Value Date    LITHIUM 0.19 (L) 06/30/2014     Lab Results   Component Value Date    VALPROATE 36 (L) 04/03/2021           Treatment Plan:  Reviewed current Medications with the patient. Risks, benefits, side effects, drug-to-drug interactions and alternatives to treatment were discussed. Collateral information:   CD evaluation  Encourage patient to attend group and other milieu activities.   Discharge planning discussed with the patient and treatment team.    Continue Depakote 250 mg twice daily for mood stabilization  Continue Abilify 20 mg daily for psychosis  Continue Remeron 15 mg at bedtime for depression and sleep  Give Aristada 662 mg IM every 30 days patient is agreeable        PSYCHOTHERAPY/COUNSELING:  [x] Therapeutic interview  [x] Supportive  [] CBT  [] Ongoing  [] Other    [x] Patient continues to need, on a daily basis, active treatment furnished directly by or requiring the supervision of inpatient psychiatric personnel      Anticipated Length of stay: 3 to 5 days based on stability            Electronically signed by ELAINE Maher CNP on 1/5/2976 at 10:17 AM

## 2021-04-04 NOTE — GROUP NOTE
Group Therapy Note    Date: 4/4/2021    Group Start Time: 1110  Group End Time: 1944  Group Topic: Cognitive Skills    [facility]    DOROTHY Herrera        Group Therapy Note    Attendees: 19         Patient's Goal:  Pt will be able to identify importance of having a caring community, productive activities, and high quality relaxation/hobbies. Notes: Pt participated actively in class activity and made positive connections with others by socializing with other peers. Status After Intervention:  Improved    Participation Level:  Active Listener and Interactive    Participation Quality: Appropriate, Attentive, Sharing and Supportive      Speech:  normal      Thought Process/Content: Logical      Affective Functioning: Congruent      Mood: euthymic      Level of consciousness:  Alert, Oriented x4 and Attentive      Response to Learning: Able to verbalize current knowledge/experience, Able to verbalize/acknowledge new learning and Progressing to goal      Endings: None Reported    Modes of Intervention: Education, Support, Socialization and Problem-solving and activity      Discipline Responsible: /Counselor      Signature:  DOROTHY Dash

## 2021-04-04 NOTE — PROGRESS NOTES
Patient was out on unit social   Denies SI, HI or hallucinations. Denies anxiety or depression  Appetite is great  Sleep- getting REM sleep  No longer seeing worse fear and anxiety has subsided. Compliant with medications and attends groups. Will continue to monitor.

## 2021-04-04 NOTE — PLAN OF CARE
Pt is stable and cooperative. Pt denies suicidal or homicidal ideations. Pt is cooperative. Pt has a pleasant affect. Will follow and monitor. No other distress observed.

## 2021-04-04 NOTE — PLAN OF CARE
Problem: Altered Mood, Manic Behavior:  Goal: Able to sleep  Description: Able to sleep  4/4/2021 1650 by Eliazar Lynn RN  Outcome: Met This Shift     Problem: Altered Mood, Manic Behavior:  Goal: Able to verbalize decrease in frequency and intensity of racing thoughts  Description: Able to verbalize decrease in frequency and intensity of racing thoughts  Outcome: Met This Shift     Problem: Altered Mood, Manic Behavior:  Goal: Ability to disclose and discuss suicidal ideas will improve  Description: Ability to disclose and discuss suicidal ideas will improve  Outcome: Met This Shift     Problem: Altered Mood, Psychotic Behavior:  Goal: Able to demonstrate trust by eating, participating in treatment and following staff's direction  Description: Able to demonstrate trust by eating, participating in treatment and following staff's direction  4/4/2021 1650 by Eliazar Lynn RN  Outcome: Met This Shift     Problem: Altered Mood, Psychotic Behavior:  Goal: Able to verbalize decrease in frequency and intensity of hallucinations  Description: Able to verbalize decrease in frequency and intensity of hallucinations  Outcome: Met This Shift     Problem: Altered Mood, Psychotic Behavior:  Goal: Able to verbalize reality based thinking  Description: Able to verbalize reality based thinking  Outcome: Met This Shift     Problem: Altered Mood, Psychotic Behavior:  Goal: Ability to achieve adequate nutritional intake will improve  Description: Ability to achieve adequate nutritional intake will improve  Outcome: Met This Shift

## 2021-04-05 VITALS
HEART RATE: 75 BPM | HEIGHT: 68 IN | OXYGEN SATURATION: 100 % | TEMPERATURE: 98.5 F | DIASTOLIC BLOOD PRESSURE: 72 MMHG | BODY MASS INDEX: 22.73 KG/M2 | SYSTOLIC BLOOD PRESSURE: 131 MMHG | WEIGHT: 150 LBS | RESPIRATION RATE: 14 BRPM

## 2021-04-05 PROCEDURE — 99239 HOSP IP/OBS DSCHRG MGMT >30: CPT | Performed by: NURSE PRACTITIONER

## 2021-04-05 PROCEDURE — 6370000000 HC RX 637 (ALT 250 FOR IP): Performed by: NURSE PRACTITIONER

## 2021-04-05 RX ORDER — MIRTAZAPINE 15 MG/1
15 TABLET, FILM COATED ORAL NIGHTLY
Qty: 30 TABLET | Refills: 3 | Status: SHIPPED | OUTPATIENT
Start: 2021-04-05

## 2021-04-05 RX ORDER — ARIPIPRAZOLE 20 MG/1
20 TABLET ORAL DAILY
Qty: 30 TABLET | Refills: 0 | Status: SHIPPED | OUTPATIENT
Start: 2021-04-05 | End: 2022-09-28

## 2021-04-05 RX ORDER — DIVALPROEX SODIUM 250 MG/1
250 TABLET, DELAYED RELEASE ORAL EVERY 12 HOURS SCHEDULED
Qty: 60 TABLET | Refills: 0 | Status: ON HOLD | OUTPATIENT
Start: 2021-04-05 | End: 2022-10-04 | Stop reason: HOSPADM

## 2021-04-05 RX ADMIN — DIVALPROEX SODIUM 250 MG: 250 TABLET, DELAYED RELEASE ORAL at 09:00

## 2021-04-05 RX ADMIN — ARIPIPRAZOLE 20 MG: 10 TABLET ORAL at 09:00

## 2021-04-05 ASSESSMENT — PAIN SCALES - GENERAL
PAINLEVEL_OUTOF10: 0
PAINLEVEL_OUTOF10: 0

## 2021-04-05 NOTE — GROUP NOTE
Attended community meeting shared goal for the day as to be patient. Group Therapy Note    Date: 4/5/2021    Group Start Time: 1010  Group End Time: 1365  Group Topic: Psychoeducation    [facility]  [id number] [address], [address]                                                                            Group Therapy Note    Date: 4/5/2021  Number of Participants: 21  Type of Group: Psychoeducation    Wellness Binder Information  Module Name:7 cardinal rules  Patient's Goal: patient will be able to id 7 cardinal rules and how they apply to his her life. Notes: pleasant and able to share when prompted. Status After Intervention:  Improved  Participation Level:  Active Listener and Interactive  Participation Quality: Appropriate, Attentive, Sharing, and Supportive  Speech: normal   Thought Process/Content: Logical  Affective Functioning: Congruent  Mood: euthymic  Level of consciousness:  Alert, Oriented x4, and Attentive  Response to Learning: Able to verbalize/acknowledge new learning, Able to retain information, and Progressing to goal  Endings: None Reported  Modes of Intervention: Education, Support, Socialization, Exploration, and Problem-solving  Discipline Responsible: Psychoeducational Specialist  Signature:  Carlos Payne

## 2021-04-05 NOTE — PLAN OF CARE
[id number] [facility] Interdisciplinary Treatment Plan Note     Review Date & Time: 4/5/21 0900    Patient was in treatment team.    Admission Type:   Admission Type: Involuntary    Reason for admission:  Reason for Admission: \"Physical problem, wanted to get checked out and go home. But they heard I was schizophrenic and now I'm up here. \"    Estimated Length of Stay Update:   1 week   Estimated Discharge Date Update:  today    PATIENT STRENGTHS:  Patient Strengths:Strengths: Communication, Medication Compliance, No significant Physical Illness  Patient Strengths and Limitations:Limitations: Tendency to isolate self, Difficulty problem solving/relies on others to help solve problems  Addictive Behavior:Addictive Behavior  In the past 3 months, have you felt or has someone told you that you have a problem with:  : Excessive Fluid intake(energy drinks)  Do you have a history of Chemical Use?: No  Do you have a history of Alcohol Use?: No  Do you have a history of Street Drug Abuse?: Yes  Histroy of Prescripton Drug Abuse?: No  Medical Problems:   Past Medical History:   Diagnosis Date    ADD (attention deficit disorder with hyperactivity)     Anxiety     Cerebral hemorrhage (HCC)     [facility] palsy ([facility]     Depression     Schizophrenia ([facility]        Risk:  Fall RiskTotal: 73  Jamison Scale Jamison Scale Score: 22  BVC Total: 0  Change in scores: no risk identified .  Changes to plan of Care : home today    Status EXAM:   Status and Exam  Normal: Yes(Resting in bed apparently asleep)  Facial Expression: Worried  Affect: Congruent  Level of Consciousness: Alert  Mood:Normal: No  Mood: Anxious  Motor Activity:Normal: Yes  Motor Activity: Increased  Interview Behavior: Cooperative  Preception: [place] to Person, Myrla Lighter to Time, [place] to Place, [place] to Situation  Attention:Normal: Yes  Attention: Others(See comment)(impaired concentration)  Thought Processes: Circumstantial  Thought Content:Normal: Yes  Thought Content: Obsessions  Hallucinations: None  Delusions: No  Delusions: Obsessions  Memory:Normal: Yes  Memory: Poor Recent  Insight and Judgment: No  Insight and Judgment: Poor Judgment, Poor Insight, Unmotivated  Present Suicidal Ideation: No  Present Homicidal Ideation: No    Daily Assessment Last Entry:   Daily Sleep (WDL): Within Defined Limits         Patient Currently in Pain: Other (comment)(Resting in bed apparently asleep)  Daily Nutrition (WDL): Within Defined Limits    Patient Monitoring:  Frequency of Checks: 4 times per hour, close    Psychiatric Symptoms:   Depression Symptoms  Depression Symptoms: No problems reported or observed. Anxiety Symptoms  Anxiety Symptoms: Generalized  Breana Symptoms  Breana Symptoms: No problems reported or observed. Psychosis Symptoms  Hallucination Type: No problems reported or observed. Delusion Type: No problems reported or observed. Suicide Risk CSSR-S:  1) Within the past month, have you wished you were dead or wished you could go to sleep and not wake up? : No  2) Have you actually had any thoughts of killing yourself? : No  3) Have you been thinking about how you might kill yourself? : No  5) Have you started to work out or worked out the details of how to kill yourself?  Do you intend to carry out this plan? : No  6) Have you ever done anything, started to do anything, or prepared to do anything to end your life?: No  Change in Result: no risk identified  Change in Plan of care :  Home today    EDUCATION:   Learner Progress Toward Treatment Goals: Reviewed results and recommendations of this team    Method: Small group    Outcome: Verbalized understanding    PATIENT GOALS: \"I am ready to go home\" \"be patient\"    PLAN/TREATMENT RECOMMENDATIONS UPDATE:  Discharged with new medications and follow up at [address] [place] [address]:  Time frame for Short-Term Goals:  1 week      Rupert Dawson RN

## 2021-04-05 NOTE — PROGRESS NOTES
[facility]  Discharge Note    Pt discharged with followings belongings:   Dentures: None  Vision - Corrective Lenses: None  Hearing Aid: None  Jewelry: None  Body Piercings Removed: N/A  Clothing: Shirt, Socks, Pants, Undergarments (Comment)  Were All Patient Medications Collected?: Not Applicable  Other Valuables: Money (Comment), Wallet   Valuables sent home with pt. Valuables retrieved from locker and returned to patient. Patient education on aftercare instructions:  Given, along with filled prescriptions and suicide crisis management plan. Information faxed to out pt. provider by  . Patient verbalize understanding of AVS:   Yes with stated potential to comply to same.  .    Status EXAM upon discharge:  Status and Exam  Normal: Yes(Resting in bed apparently asleep)  Facial Expression: Worried  Affect: Blunt  Level of Consciousness: Alert  Mood:Normal: yes  Mood: pleasant  Motor Activity:Normal: Yes  Interview Behavior: Cooperative  Preception: [place] to Person, Emaline Elma to Time, [place] to Place, [place] to Situation  Attention:Normal: yes  Thought Processes: clear  Thought Content:Normal: yes  Hallucinations: None  Delusions: No  Memory:Normal: Yes  Insight and Judgment: impaired  Present Suicidal Ideation: No  Present Homicidal Ideation: No      Metabolic Screening:    Lab Results   Component Value Date    LABA1C 5.0 03/14/2021       Lab Results   Component Value Date    CHOL 100 03/14/2021     Lab Results   Component Value Date    TRIG 64 03/14/2021     Lab Results   Component Value Date    HDL 29 03/14/2021     No components found for: [facility]  Lab Results   Component Value Date    LABVLDL 13 03/14/2021       Tayla Hahn RN

## 2021-04-05 NOTE — SUICIDE SAFETY PLAN
SAFETY PLAN    A suicide Safety Plan is a document that supports someone when they are having thoughts of suicide. Warning Signs that indicate a suicidal crisis may be developing: What (situations, thoughts, feelings, body sensations, behaviors, etc.) do you experience that lets you know you are beginning to think about suicide? 1. delusions  2. anxiety  3. paranoia    Internal Coping Strategies:  What things can I do (relaxation techniques, hobbies, physical activities, etc.) to take my mind off my problems without contacting another person? 1. music  2. meditation  3. Video games    People and social settings that provide distraction: Who can I call or where can I go to distract me? 1. Name: mom  Phone: [phone]  2. Name: dad  Phone: [phone]  3. Place: retail food store                People whom I can ask for help: Who can I call when I need help - for example, friends, family, clergy, someone else? 1. Name: mom                Phone: as above  2. Name: aunt  Phone: [phone]  3. Call Andres or Bia Ramirez [phone]      Professionals or [facility] agencies I can contact during a crisis: Who can I call for help - for example, my doctor, my psychiatrist, my psychologist, a mental health provider, a suicide hotline? 1. Clinician Name: Bia Ramirez   Phone: [phone] [facility] Pager or Emergency Contact #: [phone]          0. Suicide Prevention Lifeline: 7-577-567-TALK (0189)    3. [address] [place] [address] Emergency Services -  for example, [facility] suicide hotline, [address] [place] [address] Avenue: [facility]      Emergency Services Address: [address] [place] [address]      Emergency Services Phone: [phone]    Making the environment safe: How can I make my environment (house/apartment/living space) safer? For example, can I remove guns, medications, and other items? 1. NO GUNS  2.  NO ALCOHOL OR STREET DRUGS

## 2021-04-05 NOTE — DISCHARGE SUMMARY
DISCHARGE SUMMARY  Patient personally seen and examined by me and mental status exam performed. I agree the below assessment by the medical student. Psychomotor evaluation revealed no agitation retardation any abnormal movements. His eye contact is fair his speech is normal rate rhythm and tone. His mood is \"I feel a lot better\"  Affect is mood congruent bright pleasant appropriate. His thought process is linear without flight of ideas loose associations. Thought contents devoid of any auditory visual hallucinations delusions or other perceptual normalities. He denies suicidal homicidal ideations intent or plan impulse control is adequate cognitive function peers to be his baseline his insight judgment is fair he is alert oriented time place and person        Patient ID:  Suma Henley  [id number]  27 y.o.  1995    Admit date: 3/30/2021    Discharge date and time: 4/5/2021    Admitting Physician: Romero Soni MD     Discharge Physician: Dr Raul Sinha MD    Discharge Diagnoses:   Patient Active Problem List   Diagnosis    Mood disorder ([address]    Cannabis use disorder, severe, dependence ([address]    Tobacco use disorder, continuous    Severe recurrent major depression with psychotic features ([address]    Depression    Gender dysphoria in adult    Suicidal ideation    Depression, major, single episode    Severe manic bipolar 1 disorder with psychotic behavior ([address]       Admission Condition: poor    Discharged Condition: stable    Admission Circumstance:     Patient presented to ED with psychosis and paranoid delusions related to a crystal necklace.         PAST MEDICAL/PSYCHIATRIC HISTORY:   Past Medical History:   Diagnosis Date    ADD (attention deficit disorder with hyperactivity)     Anxiety     Cerebral hemorrhage (HCC)     Cerebral palsy (HCC)     Depression     Schizophrenia (HCC)        FAMILY/SOCIAL HISTORY:  Family History   Problem Relation Age of Onset    Mental Illness Mother    [facility] High Blood Pressure Father     Other Father     Mental Illness Brother      Social History     Socioeconomic History    Marital status: Single     Spouse name: Not on file    Number of children: 0    Years of education: 15    Highest education level: Not on file   Occupational History    Occupation: STUDENT     Comment: [employer]   Social Needs    Financial resource strain: Not on file    Food insecurity     Worry: Not on file     Inability: Not on file   [employer] needs     Medical: Not on file     Non-medical: Not on file   Tobacco Use    Smoking status: Current Every Day Smoker     Packs/day: 1.00     Types: Cigarettes    Smokeless tobacco: Former User   Substance and Sexual Activity    Alcohol use: No    Drug use: Yes     Frequency: 7.0 times per week     Types: Marijuana    Sexual activity: Never   Lifestyle    Physical activity     Days per week: Not on file     Minutes per session: Not on file    Stress: Not on file   Relationships    Social connections     Talks on phone: Not on file     Gets together: Not on file     Attends Anabaptism service: Not on file     Active member of club or organization: Not on file     Attends meetings of clubs or organizations: Not on file     Relationship status: Not on file    Intimate partner violence     Fear of current or ex partner: Not on file     Emotionally abused: Not on file     Physically abused: Not on file     Forced sexual activity: Not on file   Other Topics Concern    Not on file   Social History Narrative    Not on file       MEDICATIONS:    Current Facility-Administered Medications:     ARIPiprazole lauroxil (ARISTADA) injection 662 mg, 662 mg, Intramuscular, Q30 Days, ELAINE Madsen CNP, 414 mg at 04/03/21 1300    ARIPiprazole (ABILIFY) tablet 20 mg, 20 mg, Oral, Daily, ELAINE Madsen CNP, 20 mg at 04/05/21 0900    mirtazapine (REMERON) tablet 15 mg, 15 mg, Oral, Nightly, Willa GAITAN He sleep and appetite are good and he shares that his sense of self worth is better than it has ever been and that he plans to build on good habits of self care. We discussed the benefits of medication compliance, exercise, walking, and engaging with people. He reports feeling well stabilized on his current medications and expresses no adverse side effects. He is ready to go home and arrangements have been made for his uncle to provide transportation. He no longer meets criteria for inpatient hospitalization. Appetite:  [x] Normal  [] Increased  [] Decreased    Sleep:       [x] Normal  [] Fair       [] Poor            Energy:    [x] Normal  [] Increased  [] Decreased     SI [] Present  [x] Absent  HI  []Present  [x] Absent   Aggression:  [] yes  [x] no  Patient is [x] able  [] unable to CONTRACT FOR SAFETY   Medication side effects(SE):  [x] None(Psych.  Meds.) [] Other      Mental Status Examination on discharge:    Level of consciousness:  within normal limits   Appearance:  well-appearing  Behavior/Motor:  no abnormalities noted  Attitude toward examiner:  attentive and good eye contact  Speech:  spontaneous, normal rate and normal volume   Mood: expressing hope and optimism  Affect:  mood congruent  Thought processes:  linear, goal directed and coherent   Thought content:  Devoid AVH, perceptual abnormalities, SI, HI  Cognition:  oriented to person, place, and time   Concentration intact  Memory intact  Insight good   Judgement fair   Fund of Knowledge adequate      ASSESSMENT:  Patient symptoms are:  [x] Well controlled  [x] Improving  [] Worsening  [] No change    Reason for more than one antipsychotic:  [x] N/A  [] 3 Failed Monotherapy attempts (Drugs tried:)  [] Crossover to a new antipsychotic  [] Taper to Monotherapy from Polypharmacy  [] Augmentation of clozapine therapy due to treatment resistance to single therapy    Diagnosis:  Principal Problem:    Severe manic bipolar 1 disorder with psychotic behavior ([facility]  Active Problems:    Cannabis use disorder, severe, dependence ([facility]  Resolved Problems:    * No resolved hospital problems. *      LABS:    No results for input(s): WBC, HGB, PLT in the last 72 hours. No results for input(s): NA, K, CL, CO2, BUN, CREATININE, GLUCOSE in the last 72 hours. No results for input(s): BILITOT, ALKPHOS, AST, ALT in the last 72 hours. Lab Results   Component Value Date    LABAMPH NOT DETECTED 03/30/2021    LABAMPH NOT DETECTED 06/25/2012    BARBSCNU NOT DETECTED 03/30/2021    LABBENZ NOT DETECTED 03/30/2021    LABBENZ SEE BELOW 04/28/2014    CANNAB POSITIVE  06/25/2012    LABMETH NOT DETECTED 03/30/2021    OPIATESCREENURINE NOT DETECTED 03/30/2021    PHENCYCLIDINESCREENURINE NOT DETECTED 03/30/2021    ETOH <10 03/30/2021     Lab Results   Component Value Date    TSH 0.885 03/30/2021     Lab Results   Component Value Date    LITHIUM 0.19 (L) 06/30/2014     Lab Results   Component Value Date    VALPROATE 36 (L) 04/03/2021       RISK ASSESSMENT AT DISCHARGE: Low risk for suicide and homicide. Treatment Plan:  Reviewed current Medications with the patient. Education provided on the complaince with treatment. Risks, benefits, side effects, drug-to-drug interactions and alternatives to treatment were discussed. Encourage patient to attend outpatient follow up appointment and therapy. Patient was advised to call the outpatient provider, visit the nearest ED or call 911 if symptoms are not manageable. Patient's family member was contacted prior to the discharge. Medication List      START taking these medications    ARIPiprazole lauroxil 662 MG/2.4ML Prsy injection  Commonly known as: ARISTADA  Inject 2.4 mLs into the muscle every 30 days for 1 dose Aristada 662 mg IM q 30 days next injection due 5/3/21  Start taking on:  May 3, 2021     divalproex 250 MG DR tablet  Commonly known as: DEPAKOTE  Take 1 tablet by mouth every 12 hours     nicotine polacrilex 4 MG gum  Commonly known as: NICORETTE  Take 1 each by mouth as needed for Smoking cessation        CHANGE how you take these medications    ARIPiprazole 20 MG tablet  Commonly known as: ABILIFY  Take 1 tablet by mouth daily  What changed:   · medication strength  · how much to take     mirtazapine 15 MG tablet  Commonly known as: REMERON  Take 1 tablet by mouth nightly  What changed:   · medication strength  · how much to take        STOP taking these medications    busPIRone 30 MG tablet  Commonly known as: BUSPAR     traZODone 50 MG tablet  Commonly known as: DESYREL           Where to Get Your Medications      These medications were sent to [facility] [address] [address]    Phone: [phone]   · ARIPiprazole 20 MG tablet  · ARIPiprazole lauroxil 662 MG/2.4ML Prsy injection  · divalproex 250 MG DR tablet  · mirtazapine 15 MG tablet     Information about where to get these medications is not yet available    Ask your nurse or doctor about these medications  · nicotine polacrilex 4 MG gum           Patient is counseled he must remain compliant with all medications outpatient follow-up ointments    Patient is discharged home in stable condition      TIME SPEND - Jaziel Malloy 1841, DISCHARGE SUMMARY, MEDICATION RECONCILIATION AND FOLLOW UP CARE     Signed:  Chuck Mcknight  4/5/2021  10:11 AM

## 2021-04-05 NOTE — CARE COORDINATION
In order to ensure appropriate transition and discharge planning is in place, the following documents have been transmitted to [facility] , as the new outpatient provider:     The d/c diagnosis was transmitted to the next care provider   The reason for hospitalization was transmitted to the next care provider   The d/c medications (dosage and indication) were transmitted to the next care provider    The continuing care plan was transmitted to the next care provider

## 2021-04-05 NOTE — GROUP NOTE
Group Therapy Note    Date: 4/5/2021    Group Start Time: 1115  Group End Time: 1200  Group Topic: Cognitive Skills    [facility]    DOROTHY Herrera        Group Therapy Note    Attendees: 19         Patient's Goal: Pt will be able to discuss how thoughts influence moods and behaviors. Pt will be able to state rational thoughts vs negative thoughts for class discussion and give specific examples. Notes:  Pt active in class discussion. Status After Intervention:  Improved    Participation Level:  Active Listener    Participation Quality: Appropriate, Attentive, Sharing and Supportive      Speech:  normal      Thought Process/Content: Logical      Affective Functioning: Congruent      Mood: euthymic      Level of consciousness:  Alert, Oriented x4 and Attentive      Response to Learning: Able to verbalize current knowledge/experience, Able to verbalize/acknowledge new learning and Progressing to goal      Endings: None Reported    Modes of Intervention: Education, Support, Socialization and Problem-solving      Discipline Responsible: /Counselor      Signature:  DOROTHY Caballero

## 2021-12-14 NOTE — PROGRESS NOTES
Wanting to go home and misses his dog. Affect is brighter and less worried and behavior has been appropriate. Interacting more with others on the unit. Told everybody good night before going to bed tonight. no concerns

## 2022-09-27 ENCOUNTER — HOSPITAL ENCOUNTER (INPATIENT)
Age: 27
LOS: 7 days | Discharge: HOME | DRG: 753 | End: 2022-10-04
Attending: STUDENT IN AN ORGANIZED HEALTH CARE EDUCATION/TRAINING PROGRAM | Admitting: PSYCHIATRY & NEUROLOGY
Payer: COMMERCIAL

## 2022-09-27 DIAGNOSIS — R45.851 SUICIDAL IDEATIONS: Primary | ICD-10-CM

## 2022-09-27 PROBLEM — F31.9 BIPOLAR 1 DISORDER (HCC): Status: ACTIVE | Noted: 2022-09-27

## 2022-09-27 LAB
ACETAMINOPHEN LEVEL: <5 MCG/ML (ref 10–30)
ALBUMIN SERPL-MCNC: 4.4 G/DL (ref 3.5–5.2)
ALP BLD-CCNC: 76 U/L (ref 40–129)
ALT SERPL-CCNC: 49 U/L (ref 0–40)
AMPHETAMINE SCREEN, URINE: NOT DETECTED
ANION GAP SERPL CALCULATED.3IONS-SCNC: 13 MMOL/L (ref 7–16)
AST SERPL-CCNC: 31 U/L (ref 0–39)
BACTERIA: ABNORMAL /HPF
BARBITURATE SCREEN URINE: NOT DETECTED
BASOPHILS ABSOLUTE: 0.03 E9/L (ref 0–0.2)
BASOPHILS RELATIVE PERCENT: 0.3 % (ref 0–2)
BENZODIAZEPINE SCREEN, URINE: NOT DETECTED
BILIRUB SERPL-MCNC: 0.4 MG/DL (ref 0–1.2)
BILIRUBIN URINE: NEGATIVE
BLOOD, URINE: NEGATIVE
BUN BLDV-MCNC: 7 MG/DL (ref 6–20)
CALCIUM SERPL-MCNC: 9.5 MG/DL (ref 8.6–10.2)
CANNABINOID SCREEN URINE: POSITIVE
CHLORIDE BLD-SCNC: 104 MMOL/L (ref 98–107)
CLARITY: CLEAR
CO2: 24 MMOL/L (ref 22–29)
COCAINE METABOLITE SCREEN URINE: NOT DETECTED
COLOR: YELLOW
CREAT SERPL-MCNC: 1 MG/DL (ref 0.7–1.2)
EOSINOPHILS ABSOLUTE: 0.03 E9/L (ref 0.05–0.5)
EOSINOPHILS RELATIVE PERCENT: 0.3 % (ref 0–6)
EPITHELIAL CELLS, UA: ABNORMAL /HPF
ETHANOL: <10 MG/DL (ref 0–0.08)
FENTANYL SCREEN, URINE: NOT DETECTED
GFR AFRICAN AMERICAN: >60
GFR NON-AFRICAN AMERICAN: >60 ML/MIN/1.73
GLUCOSE BLD-MCNC: 155 MG/DL (ref 74–99)
GLUCOSE URINE: NEGATIVE MG/DL
HBA1C MFR BLD: 5.2 % (ref 4–5.6)
HCT VFR BLD CALC: 49.8 % (ref 37–54)
HEMOGLOBIN: 17.3 G/DL (ref 12.5–16.5)
IMMATURE GRANULOCYTES #: 0.01 E9/L
IMMATURE GRANULOCYTES %: 0.1 % (ref 0–5)
INFLUENZA A: NOT DETECTED
INFLUENZA B: NOT DETECTED
KETONES, URINE: ABNORMAL MG/DL
LEUKOCYTE ESTERASE, URINE: NEGATIVE
LYMPHOCYTES ABSOLUTE: 1.95 E9/L (ref 1.5–4)
LYMPHOCYTES RELATIVE PERCENT: 20 % (ref 20–42)
Lab: ABNORMAL
MCH RBC QN AUTO: 30.2 PG (ref 26–35)
MCHC RBC AUTO-ENTMCNC: 34.7 % (ref 32–34.5)
MCV RBC AUTO: 87.1 FL (ref 80–99.9)
METHADONE SCREEN, URINE: NOT DETECTED
MONOCYTES ABSOLUTE: 0.25 E9/L (ref 0.1–0.95)
MONOCYTES RELATIVE PERCENT: 2.6 % (ref 2–12)
MUCUS: PRESENT /LPF
NEUTROPHILS ABSOLUTE: 7.5 E9/L (ref 1.8–7.3)
NEUTROPHILS RELATIVE PERCENT: 76.7 % (ref 43–80)
NITRITE, URINE: NEGATIVE
OPIATE SCREEN URINE: NOT DETECTED
OXYCODONE URINE: NOT DETECTED
PDW BLD-RTO: 12.8 FL (ref 11.5–15)
PH UA: 6 (ref 5–9)
PHENCYCLIDINE SCREEN URINE: NOT DETECTED
PLATELET # BLD: 224 E9/L (ref 130–450)
PMV BLD AUTO: 11.2 FL (ref 7–12)
POTASSIUM SERPL-SCNC: 4.2 MMOL/L (ref 3.5–5)
PROTEIN UA: NEGATIVE MG/DL
RBC # BLD: 5.72 E12/L (ref 3.8–5.8)
RBC UA: ABNORMAL /HPF (ref 0–2)
SALICYLATE, SERUM: <0.3 MG/DL (ref 0–30)
SARS-COV-2 RNA, RT PCR: NOT DETECTED
SODIUM BLD-SCNC: 141 MMOL/L (ref 132–146)
SPECIFIC GRAVITY UA: 1.01 (ref 1–1.03)
TOTAL PROTEIN: 7.3 G/DL (ref 6.4–8.3)
TRICYCLIC ANTIDEPRESSANTS SCREEN SERUM: NEGATIVE NG/ML
UROBILINOGEN, URINE: 1 E.U./DL
WBC # BLD: 9.8 E9/L (ref 4.5–11.5)
WBC UA: ABNORMAL /HPF (ref 0–5)

## 2022-09-27 PROCEDURE — 85025 COMPLETE CBC W/AUTO DIFF WBC: CPT

## 2022-09-27 PROCEDURE — 93005 ELECTROCARDIOGRAM TRACING: CPT | Performed by: PHYSICIAN ASSISTANT

## 2022-09-27 PROCEDURE — 87636 SARSCOV2 & INF A&B AMP PRB: CPT

## 2022-09-27 PROCEDURE — 1240000000 HC EMOTIONAL WELLNESS R&B

## 2022-09-27 PROCEDURE — 80143 DRUG ASSAY ACETAMINOPHEN: CPT

## 2022-09-27 PROCEDURE — 83036 HEMOGLOBIN GLYCOSYLATED A1C: CPT

## 2022-09-27 PROCEDURE — 80179 DRUG ASSAY SALICYLATE: CPT

## 2022-09-27 PROCEDURE — 6370000000 HC RX 637 (ALT 250 FOR IP): Performed by: PSYCHIATRY & NEUROLOGY

## 2022-09-27 PROCEDURE — 82077 ASSAY SPEC XCP UR&BREATH IA: CPT

## 2022-09-27 PROCEDURE — 81001 URINALYSIS AUTO W/SCOPE: CPT

## 2022-09-27 PROCEDURE — 80053 COMPREHEN METABOLIC PANEL: CPT

## 2022-09-27 PROCEDURE — 99285 EMERGENCY DEPT VISIT HI MDM: CPT

## 2022-09-27 PROCEDURE — 80307 DRUG TEST PRSMV CHEM ANLYZR: CPT

## 2022-09-27 PROCEDURE — 36415 COLL VENOUS BLD VENIPUNCTURE: CPT

## 2022-09-27 RX ORDER — HALOPERIDOL 5 MG/ML
5 INJECTION INTRAMUSCULAR EVERY 6 HOURS PRN
Status: DISCONTINUED | OUTPATIENT
Start: 2022-09-27 | End: 2022-10-04 | Stop reason: HOSPADM

## 2022-09-27 RX ORDER — HALOPERIDOL 5 MG
5 TABLET ORAL EVERY 6 HOURS PRN
Status: DISCONTINUED | OUTPATIENT
Start: 2022-09-27 | End: 2022-10-04 | Stop reason: HOSPADM

## 2022-09-27 RX ORDER — NICOTINE 21 MG/24HR
1 PATCH, TRANSDERMAL 24 HOURS TRANSDERMAL DAILY
Status: DISCONTINUED | OUTPATIENT
Start: 2022-09-28 | End: 2022-09-27 | Stop reason: ALTCHOICE

## 2022-09-27 RX ORDER — LANOLIN ALCOHOL/MO/W.PET/CERES
3 CREAM (GRAM) TOPICAL NIGHTLY
Status: DISCONTINUED | OUTPATIENT
Start: 2022-09-27 | End: 2022-09-28

## 2022-09-27 RX ORDER — MAGNESIUM HYDROXIDE/ALUMINUM HYDROXICE/SIMETHICONE 120; 1200; 1200 MG/30ML; MG/30ML; MG/30ML
30 SUSPENSION ORAL PRN
Status: DISCONTINUED | OUTPATIENT
Start: 2022-09-27 | End: 2022-10-04 | Stop reason: HOSPADM

## 2022-09-27 RX ORDER — ACETAMINOPHEN 325 MG/1
650 TABLET ORAL EVERY 6 HOURS PRN
Status: DISCONTINUED | OUTPATIENT
Start: 2022-09-27 | End: 2022-10-04 | Stop reason: HOSPADM

## 2022-09-27 RX ORDER — HYDROXYZINE PAMOATE 50 MG/1
50 CAPSULE ORAL 3 TIMES DAILY PRN
Status: DISCONTINUED | OUTPATIENT
Start: 2022-09-27 | End: 2022-10-04 | Stop reason: HOSPADM

## 2022-09-27 RX ADMIN — MELATONIN 3 MG ORAL TABLET 3 MG: 3 TABLET ORAL at 21:34

## 2022-09-27 ASSESSMENT — LIFESTYLE VARIABLES
HOW OFTEN DO YOU HAVE A DRINK CONTAINING ALCOHOL: MONTHLY OR LESS
HOW MANY STANDARD DRINKS CONTAINING ALCOHOL DO YOU HAVE ON A TYPICAL DAY: 1 OR 2

## 2022-09-27 ASSESSMENT — SLEEP AND FATIGUE QUESTIONNAIRES
AVERAGE NUMBER OF SLEEP HOURS: 11
DO YOU USE A SLEEP AID: YES
SLEEP PATTERN: DIFFICULTY ARISING;DIFFICULTY FALLING ASLEEP
DO YOU HAVE DIFFICULTY SLEEPING: YES

## 2022-09-27 ASSESSMENT — PATIENT HEALTH QUESTIONNAIRE - PHQ9: SUM OF ALL RESPONSES TO PHQ QUESTIONS 1-9: 22

## 2022-09-27 ASSESSMENT — PAIN - FUNCTIONAL ASSESSMENT: PAIN_FUNCTIONAL_ASSESSMENT: NONE - DENIES PAIN
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
## 2022-09-27 NOTE — ED NOTES
Department of Emergency Medicine  FIRST PROVIDER TRIAGE NOTE             Independent MLP           9/27/22  2:39 PM EDT    Date of Encounter: 9/27/22   MRN: [id number]      HPI: Vincenzo Friday is a 32 y.o. male who presents to the ED for No chief complaint on file. Patient is a 25-year-old struggling with suicidal ideations with a plan. Patient states he would overdose at home on his meds. Patient states Delaney Share is the only thing I could do. \"  Patient sees [facility] and sees a psychiatrist/counselor every month and sees the nurse practitioner every 3 months. Patient states he is compliant in taking his medication. ROS: Negative for cp, sob, abd pain, back pain, or fever. PE: Gen Appearance/Constitutional: alert  HEENT: NC/NT. PERRLA,  Air[employer]. Initial Plan of Care: All treatment areas with department are currently occupied.  Plan to order/Initiate the following while awaiting opening in ED: labs, EKG, and Social Work Eval.  Initiate Treatment-Testing, Proceed toTreatment Area When [employer] Available for ED Attending/MLP to [employer] signed by Miller Viveros PA-C   DD: 9/27/22       Miller Viveros PA-C  09/27/22 7721

## 2022-09-27 NOTE — ED NOTES
Assigned [id number] to [facility] in admitting     [facility]  09/27/22 4513

## 2022-09-27 NOTE — ED NOTES
Nurse to nurse report given to [facility] on [address].      Matti Giron RN  09/27/22 0728

## 2022-09-27 NOTE — ED NOTES
Belongings collected, placed in plastic belongings bag, and put in locker [facility]  09/27/22 0808

## 2022-09-27 NOTE — ED NOTES
Spoke with ED MD regarding CSSRS score and risk assessment. Patient placed on 15 minute checks.       Carmen Huynh RN  09/27/22 3443

## 2022-09-28 PROBLEM — F31.12 BIPOLAR 1 DISORDER, MANIC, MODERATE (HCC): Status: ACTIVE | Noted: 2022-09-28

## 2022-09-28 LAB
CHOLESTEROL, TOTAL: 154 MG/DL (ref 0–199)
EKG ATRIAL RATE: 74 BPM
EKG P AXIS: 56 DEGREES
EKG P-R INTERVAL: 158 MS
EKG Q-T INTERVAL: 376 MS
EKG QRS DURATION: 92 MS
EKG QTC CALCULATION (BAZETT): 417 MS
EKG R AXIS: 90 DEGREES
EKG T AXIS: 50 DEGREES
EKG VENTRICULAR RATE: 74 BPM
HBA1C MFR BLD: 5.3 % (ref 4–5.6)
HDLC SERPL-MCNC: 28 MG/DL
LDL CHOLESTEROL CALCULATED: 107 MG/DL (ref 0–99)
TRIGL SERPL-MCNC: 97 MG/DL (ref 0–149)
VLDLC SERPL CALC-MCNC: 19 MG/DL

## 2022-09-28 PROCEDURE — 36415 COLL VENOUS BLD VENIPUNCTURE: CPT

## 2022-09-28 PROCEDURE — 6370000000 HC RX 637 (ALT 250 FOR IP): Performed by: PSYCHIATRY & NEUROLOGY

## 2022-09-28 PROCEDURE — 80061 LIPID PANEL: CPT

## 2022-09-28 PROCEDURE — 83036 HEMOGLOBIN GLYCOSYLATED A1C: CPT

## 2022-09-28 PROCEDURE — 1240000000 HC EMOTIONAL WELLNESS R&B

## 2022-09-28 PROCEDURE — 6370000000 HC RX 637 (ALT 250 FOR IP): Performed by: NURSE PRACTITIONER

## 2022-09-28 PROCEDURE — 90792 PSYCH DIAG EVAL W/MED SRVCS: CPT | Performed by: NURSE PRACTITIONER

## 2022-09-28 RX ORDER — MIRTAZAPINE 15 MG/1
15 TABLET, FILM COATED ORAL NIGHTLY
Status: DISCONTINUED | OUTPATIENT
Start: 2022-09-28 | End: 2022-10-04 | Stop reason: HOSPADM

## 2022-09-28 RX ORDER — DIVALPROEX SODIUM 250 MG/1
250 TABLET, DELAYED RELEASE ORAL EVERY 12 HOURS SCHEDULED
Status: DISCONTINUED | OUTPATIENT
Start: 2022-09-28 | End: 2022-10-03

## 2022-09-28 RX ORDER — ARIPIPRAZOLE LAUROXIL 662 MG/2.4ML
662 INJECTION, SUSPENSION, EXTENDED RELEASE INTRAMUSCULAR
Status: ON HOLD | COMMUNITY
End: 2022-10-04 | Stop reason: HOSPADM

## 2022-09-28 RX ORDER — FLUOXETINE HYDROCHLORIDE 20 MG/1
20 CAPSULE ORAL DAILY
Status: ON HOLD | COMMUNITY
End: 2022-10-04 | Stop reason: HOSPADM

## 2022-09-28 RX ORDER — ARIPIPRAZOLE 10 MG/1
20 TABLET ORAL DAILY
Status: DISCONTINUED | OUTPATIENT
Start: 2022-09-28 | End: 2022-10-04 | Stop reason: HOSPADM

## 2022-09-28 RX ORDER — HYDROXYZINE PAMOATE 50 MG/1
50 CAPSULE ORAL NIGHTLY PRN
COMMUNITY

## 2022-09-28 RX ADMIN — DIVALPROEX SODIUM 250 MG: 250 TABLET, DELAYED RELEASE ORAL at 20:51

## 2022-09-28 RX ADMIN — HYDROXYZINE PAMOATE 50 MG: 50 CAPSULE ORAL at 20:01

## 2022-09-28 RX ADMIN — MIRTAZAPINE 15 MG: 15 TABLET, FILM COATED ORAL at 20:51

## 2022-09-28 RX ADMIN — ARIPIPRAZOLE 20 MG: 10 TABLET ORAL at 17:08

## 2022-09-28 RX ADMIN — NICOTINE POLACRILEX 4 MG: 2 GUM, CHEWING BUCCAL at 17:42

## 2022-09-28 ASSESSMENT — LIFESTYLE VARIABLES
HOW MANY STANDARD DRINKS CONTAINING ALCOHOL DO YOU HAVE ON A TYPICAL DAY: PATIENT DOES NOT DRINK
HOW OFTEN DO YOU HAVE A DRINK CONTAINING ALCOHOL: NEVER

## 2022-09-28 ASSESSMENT — SLEEP AND FATIGUE QUESTIONNAIRES
SLEEP PATTERN: DIFFICULTY ARISING;DIFFICULTY FALLING ASLEEP
DO YOU HAVE DIFFICULTY SLEEPING: YES
DO YOU USE A SLEEP AID: YES
AVERAGE NUMBER OF SLEEP HOURS: 11

## 2022-09-28 ASSESSMENT — PAIN SCALES - GENERAL
PAINLEVEL_OUTOF10: 0
PAINLEVEL_OUTOF10: 0

## 2022-09-28 ASSESSMENT — PATIENT HEALTH QUESTIONNAIRE - PHQ9: SUM OF ALL RESPONSES TO PHQ QUESTIONS 1-9: 22

## 2022-09-28 NOTE — PROGRESS NOTES
Attended morning community meeting. Updated on staffing and daily expectations. Shared goal for the day as to stay positive. Recreation assessment completed.
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
## 2022-09-28 NOTE — PLAN OF CARE
Problem: Anxiety  Goal: Will report anxiety at manageable levels  Description: INTERVENTIONS:  1. Administer medication as ordered  2. Teach and rehearse alternative coping skills  3. Provide emotional support with 1:1 interaction with staff  Outcome: Progressing     Problem: Coping  Goal: Pt/Family able to verbalize concerns and demonstrate effective coping strategies  Description: INTERVENTIONS:  1. Assist patient/family to identify coping skills, available support systems and cultural and spiritual values  2. Provide emotional support, including active listening and acknowledgement of concerns of patient and caregivers  3. Reduce environmental stimuli, as able  4. Instruct patient/family in relaxation techniques, as appropriate  5. Assess for spiritual pain/suffering and initiate Spiritual Care, Psychosocial Clinical Specialist consults as needed  9/28/2022 1722 by Yaima Magdaleno RN  Outcome: Progressing     Problem: Behavior  Goal: Pt/Family maintain appropriate behavior and adhere to behavioral management agreement, if implemented  Description: INTERVENTIONS:  1. Assess patient/family's coping skills and  non-compliant behavior (including use of illegal substances)  2. Notify security of behavior or suspected illegal substances which indicate the need for search of the family and/or belongings  3. Encourage verbalization of thoughts and concerns in a socially appropriate manner  4. Utilize positive, consistent limit setting strategies supporting safety of patient, staff and others  5. Encourage participation in the decision making process about the behavioral management agreement  6. If a visitor's behavior poses a threat to safety call refer to organization policy.   7. Initiate consult with , Psychosocial CNS, Spiritual Care as appropriate  Outcome: Progressing     Problem: Depression/Self Harm  Goal: Effect of psychiatric condition will be minimized and patient will be protected from self harm  Description: INTERVENTIONS:  1. Assess impact of patient's symptoms on level of functioning, self care needs and offer support as indicated  2. Assess patient/family knowledge of depression, impact on illness and need for teaching  3. Provide emotional support, presence and reassurance  4. Assess for possible suicidal thoughts or ideation. If patient expresses suicidal thoughts or statements do not leave alone, initiate Suicide Precautions, move to a room close to the nursing station and obtain sitter  5. Initiate consults as appropriate with Mental Health Professional, Spiritual Care, Psychosocial CNS, and consider a recommendation to the LIP for a Psychiatric Consultation  9/28/2022 1722 by Amber Clemente RN  Outcome: Progressing   Pt has been out on the unit but mainly keeps to himself. He is pleasant and cooperative on approach but appears somewhat anxious. He states that he does feel anxious and rates it 4/10 but states that the anxiety is better today than when he came in yesterday. He denies any depression but states that he has fleeting suicidal ideations with no plan. He denies any hallucinations.
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
## 2022-09-28 NOTE — CARE COORDINATION
Paulina contacted pt mom [id number]  (WEI signed). She reports that her only concern is to make sure that pt is taking his medications like he is supposed to and that he is feeling better. She reports that pt was being mean and saying that he wanted to kill himself prior to his admission. She reports that pt can return home and that someone will pick him up upon discharge. She denies pt access to weapons.
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
## 2022-09-28 NOTE — GROUP NOTE
Group Therapy Note    Date: 9/28/2022    Group Start Time: 1000  Group End Time: 3389  Group Topic: Education Group - Inpatient    [facility]  [address] [facility]                                                                        Group Therapy Note      Wellness Binder Information  Module Name:  keeping a good perspective. Patient's Goal:  Patient will be able to id why it is important to keep a positive perspective when things feel out of ones control. Notes:  Pleasant and sharing in group, willing to share with others. Status After Intervention:  Improved    Participation Level:  Active Listener and Interactive    Participation Quality: Appropriate, Attentive, Sharing, and Supportive      Speech:  normal      Thought Process/Content: Logical      Affective Functioning: Congruent      Mood: euthymic      Level of consciousness:  Alert, Oriented x4, and Attentive      Response to Learning: Able to verbalize/acknowledge new learning, Able to retain information, and Progressing to goal      Endings: None Reported    Modes of Intervention: Education, Support, Socialization, Exploration, and Problem-solving      Discipline Responsible: Psychoeducational Specialist      Signature:  Kunal Pryor

## 2022-09-28 NOTE — PLAN OF CARE
Problem: Coping  Goal: Pt/Family able to verbalize concerns and demonstrate effective coping strategies  Description: INTERVENTIONS:  1. Assist patient/family to identify coping skills, available support systems and cultural and spiritual values  2. Provide emotional support, including active listening and acknowledgement of concerns of patient and caregivers  3. Reduce environmental stimuli, as able  4. Instruct patient/family in relaxation techniques, as appropriate  5. Assess for spiritual pain/suffering and initiate Spiritual Care, Psychosocial Clinical Specialist consults as needed  Outcome: Progressing  Pt. Is anxious, but is cooperative and is in good control. Problem: Depression/Self Harm  Goal: Effect of psychiatric condition will be minimized and patient will be protected from self harm  Description: INTERVENTIONS:  1. Assess impact of patient's symptoms on level of functioning, self care needs and offer support as indicated  2. Assess patient/family knowledge of depression, impact on illness and need for teaching  3. Provide emotional support, presence and reassurance  4. Assess for possible suicidal thoughts or ideation. If patient expresses suicidal thoughts or statements do not leave alone, initiate Suicide Precautions, move to a room close to the nursing station and obtain sitter  5. Initiate consults as appropriate with Mental Health Professional, Spiritual Care, Psychosocial CNS, and consider a recommendation to the LIP for a Psychiatric Consultation  Outcome: Progressing   No self harm. Pt. denies suicidal ideations.
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
## 2022-09-29 PROCEDURE — 99232 SBSQ HOSP IP/OBS MODERATE 35: CPT | Performed by: NURSE PRACTITIONER

## 2022-09-29 PROCEDURE — 6370000000 HC RX 637 (ALT 250 FOR IP): Performed by: NURSE PRACTITIONER

## 2022-09-29 PROCEDURE — 6370000000 HC RX 637 (ALT 250 FOR IP): Performed by: PSYCHIATRY & NEUROLOGY

## 2022-09-29 PROCEDURE — 1240000000 HC EMOTIONAL WELLNESS R&B

## 2022-09-29 RX ADMIN — DIVALPROEX SODIUM 250 MG: 250 TABLET, DELAYED RELEASE ORAL at 21:07

## 2022-09-29 RX ADMIN — DIVALPROEX SODIUM 250 MG: 250 TABLET, DELAYED RELEASE ORAL at 09:06

## 2022-09-29 RX ADMIN — HYDROXYZINE PAMOATE 50 MG: 50 CAPSULE ORAL at 21:07

## 2022-09-29 RX ADMIN — ARIPIPRAZOLE 20 MG: 10 TABLET ORAL at 09:06

## 2022-09-29 RX ADMIN — MIRTAZAPINE 15 MG: 15 TABLET, FILM COATED ORAL at 21:07

## 2022-09-29 ASSESSMENT — PAIN SCALES - GENERAL
PAINLEVEL_OUTOF10: 0

## 2022-09-29 NOTE — PROGRESS NOTES
Attended morning community meeting. Updated on staffing and daily expectations. Able to share goal as to stay focused on my mental health.

## 2022-09-29 NOTE — PLAN OF CARE
Problem: Anxiety  Goal: Will report anxiety at manageable levels  Description: INTERVENTIONS:  1. Administer medication as ordered  2. Teach and rehearse alternative coping skills  3. Provide emotional support with 1:1 interaction with staff  9/29/2022 0259 by Briana Dalton RN  Outcome: Progressing  MOOD ANXIOUS, BUT IS IN GOOD CONTROL. Problem: Depression/Self Harm  Goal: Effect of psychiatric condition will be minimized and patient will be protected from self harm  Description: INTERVENTIONS:  1. Assess impact of patient's symptoms on level of functioning, self care needs and offer support as indicated  2. Assess patient/family knowledge of depression, impact on illness and need for teaching  3. Provide emotional support, presence and reassurance  4. Assess for possible suicidal thoughts or ideation. If patient expresses suicidal thoughts or statements do not leave alone, initiate Suicide Precautions, move to a room close to the nursing station and obtain sitter  5. Initiate consults as appropriate with Mental Health Professional, Spiritual Care, Psychosocial CNS, and consider a recommendation to the LIP for a Psychiatric Consultation  9/29/2022 1019 by Radha El RN  Outcome: Progressing  9/29/2022 0259 by Briana Dalton RN  Outcome: Progressing   NO SELF HARM, BUT REPORTS FLEETING SUICIDAL IDEATIONS WITH NO PLAN. Problem: Behavior  Goal: Pt/Family maintain appropriate behavior and adhere to behavioral management agreement, if implemented  Description: INTERVENTIONS:  1. Assess patient/family's coping skills and  non-compliant behavior (including use of illegal substances)  2. Notify security of behavior or suspected illegal substances which indicate the need for search of the family and/or belongings  3. Encourage verbalization of thoughts and concerns in a socially appropriate manner  4. Utilize positive, consistent limit setting strategies supporting safety of patient, staff and others  5.  Encourage participation in the decision making process about the behavioral management agreement  6. If a visitor's behavior poses a threat to safety call refer to organization policy. 7. Initiate consult with , Psychosocial CNS, Spiritual Care as appropriate  9/29/2022 0259 by Awilda Mabry, RN  Outcome: Progressing  NO UNIT PROBLEMS THIS A. M..

## 2022-09-29 NOTE — PLAN OF CARE
Out on the unit staying busy. Reports anxiety feelings. Reports intrusive, vague thoughts. Noted minimal interaction with peers. Taking po foods and fluids well. Denies suicidal thoughts or intent to harm self or others. No voiced delusions. Denies hallucinations.

## 2022-09-29 NOTE — PROGRESS NOTES
PT. IS UP ON UNIT. MEDICATION COMPLIANT. ATTENDS GROUPS. PT. REPORTS FLEETING SUICIDAL IDEATIONS, NO PLAN OR INTENT, CONTRACTS FOR SAFETY. PT. DENIES HALLUCINATIONS. PT. REPORTS FEELS ANXIOUS AND PACES R/T SAME. PT. REPORTS HAS INTRUSIVE AND OBSESSIVE THOUGHTS, BUT IS VAGUE WHEN DESCRIPTION OF CONTENT IS REQUESTED.

## 2022-09-29 NOTE — PROGRESS NOTES
BEHAVIORAL HEALTH FOLLOW-UP NOTE     9/29/2022     Patient was seen and examined in person, Chart reviewed   Patient's case discussed with staff/team    Chief Complaint: \"I am still having some suicidal thoughts they come and go. \"    Interim History: Patient seen up on the unit states he is still having suicidal thoughts that \"come and go. \"  He denies home homicidal ideation intent or plan denies auditory or visual hallucinations he has been out visible in the milieu attending groups and socializing with peers. He reported to nursing staff that he has intrusive thoughts but gives a vague description when asked to describe these thoughts are.   He has not complained about any intrusive thoughts to me he is out appears watchful on the unit      Appetite:   [x] Normal/Unchanged  [] Increased  [] Decreased      Sleep:       [x] Normal/Unchanged  [] Fair       [] Poor              Energy:    [x] Normal/Unchanged  [] Increased  [] Decreased        SI [] Present  [x] Absent    HI  []Present  [x] Absent     Aggression:  [] yes  [x] no    Patient is [x] able  [] unable to CONTRACT FOR SAFETY     PAST MEDICAL/PSYCHIATRIC HISTORY:   Past Medical History:   Diagnosis Date    ADD (attention deficit disorder with hyperactivity)     Anxiety     Cerebral hemorrhage (HCC)     Cerebral palsy ([facility] [address]     Depression     Schizophrenia ([facility] [address]        FAMILY/SOCIAL HISTORY:  Family History   Problem Relation Age of Onset    Mental Illness Mother     High Blood Pressure Father     Other Father     Mental Illness Brother      Social History     Socioeconomic History    Marital status: Single     Spouse name: Not on file    Number of children: 0    Years of education: 13    Highest education level: Not on file   Occupational History    Occupation: STUDENT     Comment: 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   Tobacco Use    Smoking status: Every Day     Packs/day: 1.00     Types: Cigarettes    Smokeless tobacco: Former   Vaping Use    Vaping Use: Never used   Substance and Sexual Activity    Alcohol use: No    Drug use: Yes     Frequency: 7.0 times per week     Types: Marijuana Diamante Coad)    Sexual activity: Not Currently   Other Topics Concern    Not on file   Social History Narrative    Not on file     Social Determinants of Health     Financial Resource Strain: Not on file   Food Insecurity: Not on file   Transportation Needs: Not on file   Physical Activity: Not on file   Stress: Not on file   Social Connections: Not on file   Intimate Partner Violence: Not on file   Housing Stability: Not on file           ROS:  [x] All negative/unchanged except if checked.  Explain positive(checked items) below:  [] Constitutional  [] Eyes  [] Ear/Nose/Mouth/Throat  [] Respiratory  [] CV  [] GI  []   [] Musculoskeletal  [] Skin/Breast  [] Neurological  [] Endocrine  [] Heme/Lymph  [] Allergic/Immunologic    Explanation:     MEDICATIONS:    Current Facility-Administered Medications:     ARIPiprazole (ABILIFY) tablet 20 mg, 20 mg, Oral, Daily, ELAINE Hough CNP, 20 mg at 09/29/22 0906    divalproex (DEPAKOTE) DR tablet 250 mg, 250 mg, Oral, 2 times per day, ELAINE Loya - CNP, 452 mg at 09/29/22 6191    mirtazapine (REMERON) tablet 15 mg, 15 mg, Oral, Nightly, ELAINE Hough - CNP, 15 mg at 09/28/22 2051    [START ON 10/28/2022] ARIPiprazole lauroxil (ARISTADA) injection 662 mg, 662 mg, IntraMUSCular, Q28 Days, ELAINE Madsen CNP    acetaminophen (TYLENOL) tablet 650 mg, 650 mg, Oral, Q6H PRN, Chrissy Mcknight MD    magnesium hydroxide (MILK OF MAGNESIA) 400 MG/5ML suspension 30 mL, 30 mL, Oral, Daily PRN, Chrissy Mcknight MD    aluminum & magnesium hydroxide-simethicone (MAALOX) 200-200-20 MG/5ML suspension 30 mL, 30 mL, Oral, PRN, Chrissy Mcknight MD    hydrOXYzine pamoate (VISTARIL) capsule 50 mg, 50 mg, Oral, TID PRN, Chrissy Mcknight MD, 50 mg at 09/28/22 2001    haloperidol (HALDOL) tablet 5 mg, 5 mg, Oral, Q6H PRN **OR** haloperidol lactate (HALDOL) injection 5 mg, 5 mg, IntraMUSCular, Q6H PRN, Courtney Burgess MD    nicotine polacrilex (NICORETTE) gum 4 mg, 4 mg, Oral, U2S PRN, ELAINE Felipe - CNP, 4 mg at 09/28/22 1742      Examination:  /74   Pulse 65   Temp 97.8 °F (36.6 °C) (Oral)   Resp 16   Ht 5' 9\" (1.753 m)   Wt 170 lb (77.1 kg)   SpO2 98%   BMI 25.10 kg/m²   Gait - steady  Medication side effects(SE): Denies    Mental Status Examination:    Level of consciousness:  within normal limits   Appearance:  fair grooming and fair hygiene  Behavior/Motor:  no abnormalities noted  Attitude toward examiner:  cooperative  Speech:  spontaneous, normal rate and normal volume   Mood: \" I am okay. \"  Affect: Flat and blunted  Thought processes: Linear without flight of ideas loose associations   thought content: Devoid of any auditory visual hallucinations delusions or other perceptual normalities. Dorsal suicidal ideation denies homicidal ideation intent or plan   cognition:  oriented to person, place, and time   Concentration intact  Insight fair   Judgement fair     ASSESSMENT:   Patient symptoms are:  [] Well controlled  [] Improving  [] Worsening  [x] No change      Diagnosis:   Principal Problem:    Bipolar 1 disorder, manic, moderate (HCC)  Active Problems:    Cannabis use disorder, severe, dependence ([facility] [address]  Resolved Problems:    * No resolved hospital problems.  *      LABS:    Recent Labs     09/27/22  1451   WBC 9.8   HGB 17.3*        Recent Labs     09/27/22  1451      K 4.2      CO2 24   BUN 7   CREATININE 1.0   GLUCOSE 155*     Recent Labs     09/27/22  1451   BILITOT 0.4   ALKPHOS 76   AST 31   ALT 49*     Lab Results   Component Value Date/Time    LABAMPH NOT DETECTED 09/27/2022 02:51 PM    LABAMPH NOT DETECTED 06/25/2012 05:48 PM    BARBSCNU NOT DETECTED 09/27/2022 02:51 PM    LABBENZ NOT DETECTED 09/27/2022 02:51 PM    LABBENZ SEE BELOW 04/28/2014 03:37 PM    CANNAB POSITIVE 06/25/2012 05:48 PM    LABMETH NOT DETECTED 09/27/2022 02:51 PM    OPIATESCREENURINE NOT DETECTED 09/27/2022 02:51 PM    PHENCYCLIDINESCREENURINE NOT DETECTED 09/27/2022 02:51 PM    ETOH <10 09/27/2022 02:51 PM     Lab Results   Component Value Date/Time    TSH 0.885 03/30/2021 02:17 PM     Lab Results   Component Value Date    LITHIUM 0.19 (L) 06/30/2014     Lab Results   Component Value Date    VALPROATE 36 (L) 04/03/2021           Treatment Plan:  Reviewed current Medications with the patient. Risks, benefits, side effects, drug-to-drug interactions and alternatives to treatment were discussed. Collateral information:   CD evaluation  Encourage patient to attend group and other milieu activities.   Discharge planning discussed with the patient and treatment team.    Continue Abilify 20 mg daily  Depakote 250 mg twice daily  Remeron 15 mg at bedtime    PSYCHOTHERAPY/COUNSELING:  [x] Therapeutic interview  [x] Supportive  [] CBT  [] Ongoing  [] Other    [x] Patient continues to need, on a daily basis, active treatment furnished directly by or requiring the supervision of inpatient psychiatric personnel      Anticipated Length of stay: 3 to 7 days based on stability            Electronically signed by ELAINE Jones CNP on 9/76/0352 at 1:28 PM

## 2022-09-29 NOTE — GROUP NOTE
Date: 9/29/2022    Group Start Time: 1010  Group End Time: 1050  Group Topic: Psychoeducation    [facility]    [place], [place]                                                                        Group Therapy Note    Date: 9/29/2022    Wellness Binder Information  Module Name:  dimensions of wellness     Patient's Goal:  Patient will be able to id what is going well and what needs improvement in his/her life. Notes:  Pleasant and sharing when prompted. Able to share but take turns and let others speak. Status After Intervention:  Improved    Participation Level:  Active Listener and Interactive    Participation Quality: Appropriate, Attentive, Sharing, and Supportive      Speech:  normal      Thought Process/Content: Logical      Affective Functioning: Congruent      Mood: euthymic      Level of consciousness:  Alert, Oriented x4, and Attentive      Response to Learning: Able to verbalize/acknowledge new learning, Able to retain information, and Progressing to goal      Endings: None Reported    Modes of Intervention: Education, Support, Socialization, Exploration, and Problem-solving      Discipline Responsible: Psychoeducational Specialist      Signature:  Nilson Perez

## 2022-09-29 NOTE — GROUP NOTE
Group Therapy Note    Date: 9/29/2022    Group Start Time: 1100  Group End Time: 1140  Group Topic: Psychotherapy    [facility]  [address], MSW, LSW        Group Therapy Note    Attendees: 5       Patient's Goal:  To increase socialization and improve interpersonal relationships. Notes:  Pt was an active participant in group discussion. Status After Intervention:  Improved    Participation Level: Active Listener and Interactive    Participation Quality: Appropriate, Attentive, Sharing, and Supportive      Speech:  normal      Thought Process/Content: Logical  Linear      Affective Functioning: Congruent      Mood: anxious and depressed      Level of consciousness:  Alert, Oriented x4, and Attentive      Response to Learning: Able to verbalize current knowledge/experience, Able to verbalize/acknowledge new learning, Able to retain information, Capable of insight, and Progressing to goal      Endings: None Reported    Modes of Intervention: Support, Socialization, and Exploration      Discipline Responsible: /Counselor      Signature:   PACHECO Hurt, [facility]

## 2022-09-30 PROCEDURE — 6370000000 HC RX 637 (ALT 250 FOR IP): Performed by: PSYCHIATRY & NEUROLOGY

## 2022-09-30 PROCEDURE — 99232 SBSQ HOSP IP/OBS MODERATE 35: CPT | Performed by: NURSE PRACTITIONER

## 2022-09-30 PROCEDURE — 1240000000 HC EMOTIONAL WELLNESS R&B

## 2022-09-30 PROCEDURE — 6370000000 HC RX 637 (ALT 250 FOR IP): Performed by: NURSE PRACTITIONER

## 2022-09-30 RX ORDER — LORAZEPAM 0.5 MG/1
0.5 TABLET ORAL 2 TIMES DAILY
Status: DISCONTINUED | OUTPATIENT
Start: 2022-09-30 | End: 2022-09-30

## 2022-09-30 RX ORDER — BENZTROPINE MESYLATE 0.5 MG/1
0.5 TABLET ORAL 2 TIMES DAILY
Status: DISCONTINUED | OUTPATIENT
Start: 2022-09-30 | End: 2022-10-04 | Stop reason: HOSPADM

## 2022-09-30 RX ADMIN — DIVALPROEX SODIUM 250 MG: 250 TABLET, DELAYED RELEASE ORAL at 10:13

## 2022-09-30 RX ADMIN — DIVALPROEX SODIUM 250 MG: 250 TABLET, DELAYED RELEASE ORAL at 21:04

## 2022-09-30 RX ADMIN — BENZTROPINE MESYLATE 0.5 MG: 0.5 TABLET ORAL at 21:04

## 2022-09-30 RX ADMIN — NICOTINE POLACRILEX 4 MG: 2 GUM, CHEWING BUCCAL at 12:59

## 2022-09-30 RX ADMIN — HYDROXYZINE PAMOATE 50 MG: 50 CAPSULE ORAL at 22:08

## 2022-09-30 RX ADMIN — BENZTROPINE MESYLATE 0.5 MG: 0.5 TABLET ORAL at 10:13

## 2022-09-30 RX ADMIN — ARIPIPRAZOLE 20 MG: 10 TABLET ORAL at 10:12

## 2022-09-30 RX ADMIN — MIRTAZAPINE 15 MG: 15 TABLET, FILM COATED ORAL at 21:03

## 2022-09-30 ASSESSMENT — PAIN SCALES - GENERAL: PAINLEVEL_OUTOF10: 0

## 2022-09-30 NOTE — GROUP NOTE
Group Therapy Note    Date: 9/30/2022    Group Start Time: 1000  Group End Time: 1030  Group Topic: Cognitive Skills    [facility] 1    Thankful PACHECO Reina, SHIKHA        Group Therapy Note    Attendees: 13       Patient's Goal:  Patient will be able to identify the different types of boundaries and how they effect themselves and others. Notes:  Patient was an active participant in group therapy. Status After Intervention:  Improved    Participation Level: Active Listener and Interactive    Participation Quality: Appropriate, Attentive, and Sharing      Speech:  normal      Thought Process/Content: Logical      Affective Functioning: Congruent      Mood: euthymic      Level of consciousness:  Alert, Oriented x4, and Attentive      Response to Learning: Able to verbalize current knowledge/experience and Able to verbalize/acknowledge new learning      Endings: None Reported    Modes of Intervention: Education, Support, Socialization, Exploration, Clarifying, and Problem-solving      Discipline Responsible: /Counselor      Signature:   PACHECO Mejia, SHIKHA 82

## 2022-09-30 NOTE — PLAN OF CARE
Problem: Anxiety  Goal: Will report anxiety at manageable levels  Description: INTERVENTIONS:  1. Administer medication as ordered  2. Teach and rehearse alternative coping skills  3. Provide emotional support with 1:1 interaction with staff  9/30/2022 1830 by Mónica Fernandez RN  Outcome: Progressing  9/30/2022 1553 by Taisha Urbina RN  Outcome: Progressing     Problem: Coping  Goal: Pt/Family able to verbalize concerns and demonstrate effective coping strategies  Description: INTERVENTIONS:  1. Assist patient/family to identify coping skills, available support systems and cultural and spiritual values  2. Provide emotional support, including active listening and acknowledgement of concerns of patient and caregivers  3. Reduce environmental stimuli, as able  4. Instruct patient/family in relaxation techniques, as appropriate  5. Assess for spiritual pain/suffering and initiate Spiritual Care, Psychosocial Clinical Specialist consults as needed  9/30/2022 1830 by Mónica Fernandez RN  Outcome: Progressing  9/30/2022 1553 by Taisha Urbina RN  Outcome: Progressing  9/30/2022 1553 by Taisha Urbina RN  Outcome: Progressing     Problem: Behavior  Goal: Pt/Family maintain appropriate behavior and adhere to behavioral management agreement, if implemented  Description: INTERVENTIONS:  1. Assess patient/family's coping skills and  non-compliant behavior (including use of illegal substances)  2. Notify security of behavior or suspected illegal substances which indicate the need for search of the family and/or belongings  3. Encourage verbalization of thoughts and concerns in a socially appropriate manner  4. Utilize positive, consistent limit setting strategies supporting safety of patient, staff and others  5. Encourage participation in the decision making process about the behavioral management agreement  6. If a visitor's behavior poses a threat to safety call refer to organization policy.   7. Initiate consult with Social Worker, Psychosocial CNS, Spiritual Care as appropriate  Outcome: Progressing       Pt denies SI, HI and AVH. Pt is out on the unit. Social with select peers. Pt spoke of wanting to make games and wanting purpose. Pt encouraged to volunteer to which he was positively accepted the suggestion. 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 \" Pt spoke of his paranoia when he went to college. \"I thought the teachers were stealing the students money. I felt that way at [address] too. I thought it was a fake school. \" Pt stated he does not feel that way now. \"Maybe I can go back and take some basic math classes. That's where I need help for my future. \" Pt presents flat and anxious. Pressured speech. Attends groups. Medication compliant. Will continue to monitor.

## 2022-09-30 NOTE — PLAN OF CARE
Problem: Anxiety  Goal: Will report anxiety at manageable levels  Description: INTERVENTIONS:  1. Administer medication as ordered  2. Teach and rehearse alternative coping skills  3. Provide emotional support with 1:1 interaction with staff  Outcome: Progressing   PT. REPORTS NO ANXIETY ON ASSESSMENT THIS A.M., BUT WAS NOTED TO BE STANDING AND STARING IN [place]. Problem: Coping  Goal: Pt/Family able to verbalize concerns and demonstrate effective coping strategies  Description: INTERVENTIONS:  1. Assist patient/family to identify coping skills, available support systems and cultural and spiritual values  2. Provide emotional support, including active listening and acknowledgement of concerns of patient and caregivers  3. Reduce environmental stimuli, as able  4. Instruct patient/family in relaxation techniques, as appropriate  5. Assess for spiritual pain/suffering and initiate Spiritual Care, Psychosocial Clinical Specialist consults as needed  9/30/2022 1553 by Dani Palm RN  Outcome: Progressing  9/30/2022 1553 by Dani Palm RN  Outcome: Progressing  PT. IS MEDICATION COMPLIANT AND ATTENDS SELECT GROUPS. Problem: Depression/Self Harm  Goal: Effect of psychiatric condition will be minimized and patient will be protected from self harm  Description: INTERVENTIONS:  1. Assess impact of patient's symptoms on level of functioning, self care needs and offer support as indicated  2. Assess patient/family knowledge of depression, impact on illness and need for teaching  3. Provide emotional support, presence and reassurance  4. Assess for possible suicidal thoughts or ideation. If patient expresses suicidal thoughts or statements do not leave alone, initiate Suicide Precautions, move to a room close to the nursing station and obtain sitter  5.  Initiate consults as appropriate with Mental Health Professional, Spiritual Care, Psychosocial CNS, and consider a recommendation to the LIP for a Psychiatric Consultation  9/30/2022 1553 by Katie Vidal RN  Outcome: Progressing  9/30/2022 1553 by Katie Vidal RN  Outcome: Progressing  NO SELF HARM. PT. DENIED SUICIDAL IDEATIONS ON A.M. ASSESSMENT. PT. OBSERVED TO BE POSTURING  AS WAS STANDING IN [place] THIS A.M., HOLING ARMS IN DIFFERENT OUTSTRETCHED POSITIONS. PT. DENIES SUICIDAL IDEATIONS, HOMICIDAL IDEATIONS AND HALLUCINATIONS. PT. VOICED NO DELUSIONS. PT. MAKES NEEDS KNOWN, IS MEDICATION COMPLAINT AND ATTENDS SELECT GROUPS. PT. IS PLEASANT WIT STAFF. IN CONTROL. NO UNIT PROBLEMS. NOTED TO BE SOCIAL WITH ROOMMATE. [facility]  Day 3 Interdisciplinary Treatment Plan NOTE    Review Date & Time: 9/30/22 0930    Patient was in treatment team    Estimated Length of Stay Update:   1 WEEK  Estimated Discharge Date Update:  MONDAY    EDUCATION:   Learner Progress Toward Treatment Goals: Reviewed results and recommendations of this team    Method: Small group    Outcome: Verbalized understanding and Needs reinforcement    PATIENT GOALS: \"KEEP UP THE GOOD [address]"    PLAN/TREATMENT RECOMMENDATIONS UPDATE: ADD COGENTIN, CONTINUE OTHER MEDICATIONS, SUPPORTIVE CARE, ENCOURAGE GROUPS, ASSESS FOR SUICIDAL RISK AND PSYCHOTIC SYMPTOMS.  DISCHARGE PLANNING AND FOLLOW UP    GOALS UPDATE:   Time frame for Short-Term Goals:  1 WEEK      Katie Vidal RN

## 2022-09-30 NOTE — FLOWSHEET NOTE
CTTS discussed the tobacco treatment program with group. CTTS discussed the intake process, group therapy and medications used to assist with the quit process. CTTS provided group members with brochures as well.      Electronically signed by Mliss Spurling, MSW on 9/30/2022 at 2:18 PM

## 2022-09-30 NOTE — CARE COORDINATION
Pt attended community meeting. Pt was updated on staffing and daily expectations. Pt shared their daily goal as \"stay positive. \"

## 2022-10-01 PROCEDURE — 99231 SBSQ HOSP IP/OBS SF/LOW 25: CPT | Performed by: NURSE PRACTITIONER

## 2022-10-01 PROCEDURE — 6370000000 HC RX 637 (ALT 250 FOR IP): Performed by: PSYCHIATRY & NEUROLOGY

## 2022-10-01 PROCEDURE — 1240000000 HC EMOTIONAL WELLNESS R&B

## 2022-10-01 PROCEDURE — 6370000000 HC RX 637 (ALT 250 FOR IP): Performed by: NURSE PRACTITIONER

## 2022-10-01 RX ADMIN — DIVALPROEX SODIUM 250 MG: 250 TABLET, DELAYED RELEASE ORAL at 09:15

## 2022-10-01 RX ADMIN — HYDROXYZINE PAMOATE 50 MG: 50 CAPSULE ORAL at 12:55

## 2022-10-01 RX ADMIN — HYDROXYZINE PAMOATE 50 MG: 50 CAPSULE ORAL at 21:18

## 2022-10-01 RX ADMIN — BENZTROPINE MESYLATE 0.5 MG: 0.5 TABLET ORAL at 20:51

## 2022-10-01 RX ADMIN — BENZTROPINE MESYLATE 0.5 MG: 0.5 TABLET ORAL at 09:15

## 2022-10-01 RX ADMIN — MIRTAZAPINE 15 MG: 15 TABLET, FILM COATED ORAL at 20:51

## 2022-10-01 RX ADMIN — NICOTINE POLACRILEX 4 MG: 2 GUM, CHEWING BUCCAL at 10:48

## 2022-10-01 RX ADMIN — ARIPIPRAZOLE 20 MG: 10 TABLET ORAL at 09:15

## 2022-10-01 RX ADMIN — DIVALPROEX SODIUM 250 MG: 250 TABLET, DELAYED RELEASE ORAL at 20:51

## 2022-10-01 ASSESSMENT — PAIN SCALES - GENERAL
PAINLEVEL_OUTOF10: 0
PAINLEVEL_OUTOF10: 0

## 2022-10-01 NOTE — PROGRESS NOTES
BEHAVIORAL HEALTH FOLLOW-UP NOTE     10/1/2022     Patient was seen and examined in person, Chart reviewed   Patient's case discussed with staff/team    Chief Complaint: Preoccupied with posturing on the unit this morning    Interim History: Patient seen in treatment team today. He states that he still having some anxiety. Staff reports he is normally pacing the unit this morning he was noted to be posturing and staring at the nurses station he is not able to explain why he was doing this.   He denies SI/HI intent or plan he denies auditory or visual hallucinations still seems guarded seems preoccupied speech is rapid and pressured    Appetite:   [x] Normal/Unchanged  [] Increased  [] Decreased      Sleep:       [x] Normal/Unchanged  [] Fair       [] Poor              Energy:    [x] Normal/Unchanged  [] Increased  [] Decreased        SI [] Present  [x] Absent    HI  []Present  [x] Absent     Aggression:  [] yes  [x] no    Patient is [x] able  [] unable to CONTRACT FOR SAFETY     PAST MEDICAL/PSYCHIATRIC HISTORY:   Past Medical History:   Diagnosis Date    ADD (attention deficit disorder with hyperactivity)     Anxiety     Cerebral hemorrhage (HCC)     Cerebral palsy ([facility] [address]     Depression     Schizophrenia ([facility]        FAMILY/SOCIAL HISTORY:  Family History   Problem Relation Age of Onset    Mental Illness Mother     High Blood Pressure Father     Other Father     Mental Illness Brother      Social History     Socioeconomic History    Marital status: Single     Spouse name: Not on file    Number of children: 0    Years of education: 13    Highest education level: Not on file   Occupational History    Occupation: STUDENT     Comment: 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   Tobacco Use    Smoking status: Every Day     Packs/day: 1.00     Types: Cigarettes    Smokeless tobacco: Former   Vaping Use    Vaping Use: Never used   Substance and Sexual Activity    Alcohol use: No    Drug use: Yes     Frequency: 7.0 times per week     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  Explain positive(checked items) below:  [] Constitutional  [] Eyes  [] Ear/Nose/Mouth/Throat  [] Respiratory  [] CV  [] GI  []   [] Musculoskeletal  [] Skin/Breast  [] Neurological  [] Endocrine  [] Heme/Lymph  [] Allergic/Immunologic    Explanation:     MEDICATIONS:    Current Facility-Administered Medications:     benztropine (COGENTIN) tablet 0.5 mg, 0.5 mg, Oral, BID, Victor M Snider, APRN - CNP, 0.5 mg at 09/30/22 2104    ARIPiprazole (ABILIFY) tablet 20 mg, 20 mg, Oral, Daily, Victor M Snider, APRN - CNP, 20 mg at 09/30/22 1012    divalproex (DEPAKOTE) DR tablet 250 mg, 250 mg, Oral, 2 times per day, Kirk Mar APRN - CNP, 236 mg at 09/30/22 2104    mirtazapine (REMERON) tablet 15 mg, 15 mg, Oral, Nightly, Victor M Snider, APRN - CNP, 15 mg at 09/30/22 2103    [START ON 10/28/2022] ARIPiprazole lauroxil (ARISTADA) injection 662 mg, 662 mg, IntraMUSCular, Q28 Days, Joan KANDY nSider, APRN - CNP    acetaminophen (TYLENOL) tablet 650 mg, 650 mg, Oral, Q6H PRN, Leonel Hull MD    magnesium hydroxide (MILK OF MAGNESIA) 400 MG/5ML suspension 30 mL, 30 mL, Oral, Daily PRN, Leonel Hull MD    aluminum & magnesium hydroxide-simethicone (MAALOX) 200-200-20 MG/5ML suspension 30 mL, 30 mL, Oral, PRN, Leonel Hull MD    hydrOXYzine pamoate (VISTARIL) capsule 50 mg, 50 mg, Oral, TID PRN, Leonel Hull MD, 50 mg at 09/30/22 2208    haloperidol (HALDOL) tablet 5 mg, 5 mg, Oral, Q6H PRN **OR** haloperidol lactate (HALDOL) injection 5 mg, 5 mg, IntraMUSCular, Q6H PRN, Vania Bernal MD    nicotine polacrilex (NICORETTE) gum 4 mg, 4 mg, Oral, J8Z PRN, ELAINE Scales - CNP, 4 mg at 09/30/22 1259      Examination:  /87   Pulse 83   Temp 99.2 °F (37.3 °C) (Temporal)   Resp 16   Ht 5' 9\" (1.753 m)   Wt 170 lb (77.1 kg)   SpO2 98%   BMI 25.10 kg/m²   Gait - steady  Medication side effects(SE): Denies    Mental Status Examination:    Level of consciousness:  within normal limits   Appearance:  fair grooming and fair hygiene  Behavior/Motor:  no abnormalities noted  Attitude toward examiner:  cooperative  Speech:  spontaneous, normal rate and normal volume   Mood: \" I am okay. \"  Affect: Flat and blunted  Thought processes: Linear without flight of ideas loose associations   thought content: Devoid of any auditory visual hallucinations delusions or other perceptual normalities. Dorsal suicidal ideation denies homicidal ideation intent or plan   cognition:  oriented to person, place, and time   Concentration intact  Insight fair   Judgement fair     ASSESSMENT:   Patient symptoms are:  [] Well controlled  [] Improving  [] Worsening  [x] No change      Diagnosis:   Principal Problem:    Bipolar 1 disorder, manic, moderate (HCC)  Active Problems:    Cannabis use disorder, severe, dependence ([facility] [address]  Resolved Problems:    * No resolved hospital problems. *      LABS:    No results for input(s): WBC, HGB, PLT in the last 72 hours. No results for input(s): NA, K, CL, CO2, BUN, CREATININE, GLUCOSE in the last 72 hours. No results for input(s): BILITOT, ALKPHOS, AST, ALT in the last 72 hours.     Lab Results   Component Value Date/Time    LABAMPH NOT DETECTED 09/27/2022 02:51 PM    [address] NOT DETECTED 06/25/2012 05:48 PM    BARBSCNU NOT DETECTED 09/27/2022 02:51 PM    LABBENZ NOT DETECTED 09/27/2022 02:51 PM    LABBENZ SEE BELOW 04/28/2014 03:37 PM    CANNAB POSITIVE 06/25/2012 05:48 PM LABMETH NOT DETECTED 09/27/2022 02:51 PM    OPIATESCREENURINE NOT DETECTED 09/27/2022 02:51 PM    PHENCYCLIDINESCREENURINE NOT DETECTED 09/27/2022 02:51 PM    ETOH <10 09/27/2022 02:51 PM     Lab Results   Component Value Date/Time    TSH 0.885 03/30/2021 02:17 PM     Lab Results   Component Value Date    LITHIUM 0.19 (L) 06/30/2014     Lab Results   Component Value Date    VALPROATE 36 (L) 04/03/2021           Treatment Plan:  Reviewed current Medications with the patient. Risks, benefits, side effects, drug-to-drug interactions and alternatives to treatment were discussed. Collateral information:   CD evaluation  Encourage patient to attend group and other milieu activities.   Discharge planning discussed with the patient and treatment team.    Continue Abilify 20 mg daily  Depakote 250 mg twice daily  Remeron 15 mg at bedtime  Due for aristada 662 mg IM q 30 days on 10/28/22    PSYCHOTHERAPY/COUNSELING:  [x] Therapeutic interview  [x] Supportive  [] CBT  [] Ongoing  [] Other    [x] Patient continues to need, on a daily basis, active treatment furnished directly by or requiring the supervision of inpatient psychiatric personnel      Anticipated Length of stay: 3 to 7 days based on stability            Electronically signed by ELAINE Casas CNP on 18/7/1338 at 6:20 AM

## 2022-10-01 NOTE — BH NOTE
1138 hours, Pt receives his long acting Arstada injection at [facility] which is closed until Monday, so not able to find out what the dosage is and when he received last dose.

## 2022-10-01 NOTE — GROUP NOTE
Group Therapy Note    Date: 10/1/2022    Group Start Time: 1120  Group End Time: 1200  Group Topic: Cognitive Skills    [facility] 1    PACHECO Angeles, SHIKHA        Group Therapy Note    Attendees: 11       Patient's Goal: to discuss CBT and different coping skills. Notes: pt was an active listener in group discussion. Status After Intervention:  Unchanged    Participation Level:  Active Listener    Participation Quality: Appropriate and Attentive      Speech:  normal      Thought Process/Content: Logical      Affective Functioning: Congruent      Mood: anxious      Level of consciousness:  Alert and Oriented x4      Response to Learning: Able to verbalize current knowledge/experience      Endings: None Reported    Modes of Intervention: Education, Support, Socialization, Exploration, Clarifying, and Problem-solving      Discipline Responsible: /Counselor      Signature:  PACHECO Zhang, SHIKHA

## 2022-10-01 NOTE — PROGRESS NOTES
Attended morning group facilitated by nursing students. Active and engaged during discussion on coping skills. Was 1 of 10 in attendance.

## 2022-10-01 NOTE — PROGRESS NOTES
BEHAVIORAL HEALTH FOLLOW-UP NOTE     10/1/2022     Patient was seen and examined in person, Chart reviewed   Patient's case discussed with staff/team    Chief Complaint: Out standing in unit     Interim History: Patient seen up on the unit, he is very pleasant, he has an odd affect, tells me that he feels he is doing better. He states that he still having some anxiety. Staff reports he is normally pacing the unit or observed standing in one place for long periods of time. He denies SI/HI intent or plan he denies auditory or visual hallucinations still seems guarded seems a little preoccupied.      Appetite:   [x] Normal/Unchanged  [] Increased  [] Decreased      Sleep:       [x] Normal/Unchanged  [] Fair       [] Poor              Energy:    [x] Normal/Unchanged  [] Increased  [] Decreased        SI [] Present  [x] Absent    HI  []Present  [x] Absent     Aggression:  [] yes  [x] no    Patient is [x] able  [] unable to CONTRACT FOR SAFETY     PAST MEDICAL/PSYCHIATRIC HISTORY:   Past Medical History:   Diagnosis Date    ADD (attention deficit disorder with hyperactivity)     Anxiety     Cerebral hemorrhage (HCC)     Cerebral palsy (HCC)     Depression     Schizophrenia ([facility] [address]        FAMILY/SOCIAL HISTORY:  Family History   Problem Relation Age of Onset    Mental Illness Mother     High Blood Pressure Father     Other Father     Mental Illness Brother      Social History     Socioeconomic History    Marital status: Single     Spouse name: Not on file    Number of children: 0    Years of education: 13    Highest education level: Not on file   Occupational History    Occupation: STUDENT     Comment: [employer]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   Tobacco Use    Smoking status: Every Day     Packs/day: 1.00     Types: Cigarettes    Smokeless tobacco: Former   Vaping Use    Vaping Use: Never used   Substance and Sexual Activity    Alcohol use: No    Drug use: Yes     Frequency: 7.0 times per week     Types: Marijuana (Weed) Sexual activity: Not Currently   Other Topics Concern    Not on file   Social History Narrative    Not on file     Social Determinants of Health     Financial Resource Strain: Not on file   Food Insecurity: Not on file   Transportation Needs: Not on file   Physical Activity: Not on file   Stress: Not on file   Social Connections: Not on file   Intimate Partner Violence: Not on file   Housing Stability: Not on file           ROS:  [x] All negative/unchanged except if checked.  Explain positive(checked items) below:  [] Constitutional  [] Eyes  [] Ear/Nose/Mouth/Throat  [] Respiratory  [] CV  [] GI  []   [] Musculoskeletal  [] Skin/Breast  [] Neurological  [] Endocrine  [] Heme/Lymph  [] Allergic/Immunologic    Explanation:     MEDICATIONS:    Current Facility-Administered Medications:     benztropine (COGENTIN) tablet 0.5 mg, 0.5 mg, Oral, BID, ELAINE Fernandez CNP, 0.5 mg at 10/01/22 0915    ARIPiprazole (ABILIFY) tablet 20 mg, 20 mg, Oral, Daily, ELAINE Fernandez CNP, 20 mg at 10/01/22 0915    divalproex (DEPAKOTE) DR tablet 250 mg, 250 mg, Oral, 2 times per day, ELAINE Snow - CNP, 109 mg at 10/01/22 0915    mirtazapine (REMERON) tablet 15 mg, 15 mg, Oral, Nightly, ELAINE Fernandez CNP, 15 mg at 09/30/22 2103    [START ON 10/28/2022] ARIPiprazole lauroxil (ARISTADA) injection 662 mg, 662 mg, IntraMUSCular, Q28 Days, ELAINE Madsen CNP    acetaminophen (TYLENOL) tablet 650 mg, 650 mg, Oral, Q6H PRN, Doreen Anderson MD    magnesium hydroxide (MILK OF MAGNESIA) 400 MG/5ML suspension 30 mL, 30 mL, Oral, Daily PRN, Doreen Anderson MD    aluminum & magnesium hydroxide-simethicone (MAALOX) 200-200-20 MG/5ML suspension 30 mL, 30 mL, Oral, PRN, Doreen Anderson MD    hydrOXYzine pamoate (VISTARIL) capsule 50 mg, 50 mg, Oral, TID PRN, Doreen Anderson MD, 50 mg at 09/30/22 2208    haloperidol (HALDOL) tablet 5 mg, 5 mg, Oral, Q6H PRN **OR** haloperidol lactate (HALDOL) injection 5 mg, 5 mg, IntraMUSCular, Q6H PRN, Gloria West MD    nicotine polacrilex (NICORETTE) gum 4 mg, 4 mg, Oral, Q5S PRN, ELAINE Saenz - CNP, 4 mg at 09/30/22 1259      Examination:  /85   Pulse 66   Temp 97.2 °F (36.2 °C)   Resp 14   Ht 5' 9\" (1.753 m)   Wt 170 lb (77.1 kg)   SpO2 98%   BMI 25.10 kg/m²   Gait - steady  Medication side effects(SE): Denies    Mental Status Examination:    Level of consciousness:  within normal limits   Appearance:  fair grooming and fair hygiene  Behavior/Motor:  no abnormalities noted  Attitude toward examiner:  cooperative  Speech:  spontaneous, normal rate and normal volume   Mood: \" I am okay. \"  Affect: Flat and blunted  Thought processes: Linear without flight of ideas loose associations   thought content: Devoid of any auditory visual hallucinations delusions or other perceptual normalities. Dorsal suicidal ideation denies homicidal ideation intent or plan   cognition:  oriented to person, place, and time   Concentration intact  Insight fair   Judgement fair     ASSESSMENT:   Patient symptoms are:  [] Well controlled  [] Improving  [] Worsening  [x] No change      Diagnosis:   Principal Problem:    Bipolar 1 disorder, manic, moderate (HCC)  Active Problems:    Cannabis use disorder, severe, dependence ([facility] [address]  Resolved Problems:    * No resolved hospital problems. *      LABS:    No results for input(s): WBC, HGB, PLT in the last 72 hours. No results for input(s): NA, K, CL, CO2, BUN, CREATININE, GLUCOSE in the last 72 hours. No results for input(s): BILITOT, ALKPHOS, AST, ALT in the last 72 hours.     Lab Results   Component Value Date/Time    LABAMPH NOT DETECTED 09/27/2022 02:51 PM    [address] NOT DETECTED 06/25/2012 05:48 PM    BARBSCNU NOT DETECTED 09/27/2022 02:51 PM    LABBENZ NOT DETECTED 09/27/2022 02:51 PM    LABBENZ SEE BELOW 04/28/2014 03:37 PM    CANNAB POSITIVE 06/25/2012 05:48 PM    LABMETH NOT DETECTED 09/27/2022 02:51 PM OPIATESCREENURINE NOT DETECTED 09/27/2022 02:51 PM    PHENCYCLIDINESCREENURINE NOT DETECTED 09/27/2022 02:51 PM    ETOH <10 09/27/2022 02:51 PM     Lab Results   Component Value Date/Time    TSH 0.885 03/30/2021 02:17 PM     Lab Results   Component Value Date    LITHIUM 0.19 (L) 06/30/2014     Lab Results   Component Value Date    VALPROATE 36 (L) 04/03/2021           Treatment Plan:  Reviewed current Medications with the patient. Risks, benefits, side effects, drug-to-drug interactions and alternatives to treatment were discussed. Collateral information:   CD evaluation  Encourage patient to attend group and other milieu activities.   Discharge planning discussed with the patient and treatment team.    Continue Abilify 20 mg daily  Depakote 250 mg twice daily  Remeron 15 mg at bedtime  Due for aristada 662 mg IM q 30 days on 10/28/22    PSYCHOTHERAPY/COUNSELING:  [x] Therapeutic interview  [x] Supportive  [] CBT  [] Ongoing  [] Other    [x] Patient continues to need, on a daily basis, active treatment furnished directly by or requiring the supervision of inpatient psychiatric personnel      Anticipated Length of stay: 3 to 7 days based on stability            Electronically signed by ELAINE Calzada CNP on 10/1/2022 at 9:39 AM

## 2022-10-01 NOTE — PROGRESS NOTES
Spiritual Support Group Note    Number of Participants in Group:             7              Time:   1    Goal: Relief from isolation and loneliness             Luz Maria Sharing             Self-understanding and gain insight              Acceptance and belonging            Recognize they are not alone                Socialization             Empowerment       Encouragement    Topic:  [x] Spiritual Wellness and Self Care                  [x] Hope                     [] Connecting with Divine/Others        [] Thankfulness and Gratitude               [x]  Meaningfulness and Purpose               [] Forgiveness               [] Peace               [] Connect to [facility]      [] Other    Participation Level:   [x] Active Listener   [] Minimal   [] Monopolizing   [] Interactive   [] No Participation   []  Other:     Attention:   [x] Alert   [] Distractible   [] Drowsy   [] Poor   [] Other:    Manner:   [x] Cooperative   [] Suspicious   [] Withdrawn   [] Guarded   [] Irritable   [] Inhospitable   [] Other:     Others Comments from Group:

## 2022-10-01 NOTE — PLAN OF CARE
Problem: Anxiety  Goal: Will report anxiety at manageable levels  Description: INTERVENTIONS:  1. Administer medication as ordered  2. Teach and rehearse alternative coping skills  3. Provide emotional support with 1:1 interaction with staff  10/1/2022 0923 by Sultana Milligan RN  Outcome: Progressing  [address] Pedrito Rizzo (Taken 10/1/2022 6066)  Will report anxiety at manageable levels:   Provide emotional support with 1:1 interaction with staff   Teach and rehearse alternative coping skills  10/1/2022 0451 by Jama Cleveland RN  Outcome: Progressing     Problem: Coping  Goal: Pt/Family able to verbalize concerns and demonstrate effective coping strategies  Description: INTERVENTIONS:  1. Assist patient/family to identify coping skills, available support systems and cultural and spiritual values  2. Provide emotional support, including active listening and acknowledgement of concerns of patient and caregivers  3. Reduce environmental stimuli, as able  4. Instruct patient/family in relaxation techniques, as appropriate  5. Assess for spiritual pain/suffering and initiate Spiritual Care, Psychosocial Clinical Specialist consults as needed  10/1/2022 8341 by Sultana Milligan RN  Outcome: Progressing  10/1/2022 0451 by Jama Cleveland RN  Outcome: Progressing     Problem: Behavior  Goal: Pt/Family maintain appropriate behavior and adhere to behavioral management agreement, if implemented  Description: INTERVENTIONS:  1. Assess patient/family's coping skills and  non-compliant behavior (including use of illegal substances)  2. Notify security of behavior or suspected illegal substances which indicate the need for search of the family and/or belongings  3. Encourage verbalization of thoughts and concerns in a socially appropriate manner  4. Utilize positive, consistent limit setting strategies supporting safety of patient, staff and others  5.  Encourage participation in the decision making process about the behavioral management agreement  6. If a visitor's behavior poses a threat to safety call refer to organization policy. 7. Initiate consult with , Psychosocial CNS, Spiritual Care as appropriate  10/1/2022 5671 by Jing Greenfield RN  Outcome: Progressing  10/1/2022 0451 by David Rodriguez RN  Outcome: Progressing     Problem: Depression/Self Harm  Goal: Effect of psychiatric condition will be minimized and patient will be protected from self harm  Description: INTERVENTIONS:  1. Assess impact of patient's symptoms on level of functioning, self care needs and offer support as indicated  2. Assess patient/family knowledge of depression, impact on illness and need for teaching  3. Provide emotional support, presence and reassurance  4. Assess for possible suicidal thoughts or ideation. If patient expresses suicidal thoughts or statements do not leave alone, initiate Suicide Precautions, move to a room close to the nursing station and obtain sitter  5. Initiate consults as appropriate with Mental Health Professional, Spiritual Care, Psychosocial CNS, and consider a recommendation to the LIP for a Psychiatric Consultation  10/1/2022 0923 by Jing Greenfield RN  Outcome: Progressing  10/1/2022 0451 by David Rodriguez RN  Outcome: Progressing     Problem: Pain  Goal: Verbalizes/displays adequate comfort level or baseline comfort level  10/1/2022 0451 by David Rodriguez RN  Outcome: Progressing     Received report from previous shift, alert and oriented x 4, needy, pleasant and cooperative, talkative with staff, flat affect but brightens on approach, visible on the unit, interacts with select peers, pressured speech paranoid delusions. Staff let pt vent his feelings and frustrations, positive reinforcement given. Pt medication compliant, attends groups, denies suicidal or homicidal ideation, contracts for safety, denies hallucinations, no somatic complaints noted, Q 15 minute checks for his/her safety and protection.

## 2022-10-02 PROCEDURE — 1240000000 HC EMOTIONAL WELLNESS R&B

## 2022-10-02 PROCEDURE — 6370000000 HC RX 637 (ALT 250 FOR IP): Performed by: NURSE PRACTITIONER

## 2022-10-02 PROCEDURE — 99231 SBSQ HOSP IP/OBS SF/LOW 25: CPT | Performed by: NURSE PRACTITIONER

## 2022-10-02 PROCEDURE — 6370000000 HC RX 637 (ALT 250 FOR IP): Performed by: PSYCHIATRY & NEUROLOGY

## 2022-10-02 RX ADMIN — BENZTROPINE MESYLATE 0.5 MG: 0.5 TABLET ORAL at 08:50

## 2022-10-02 RX ADMIN — DIVALPROEX SODIUM 250 MG: 250 TABLET, DELAYED RELEASE ORAL at 08:50

## 2022-10-02 RX ADMIN — ACETAMINOPHEN 650 MG: 325 TABLET, FILM COATED ORAL at 14:38

## 2022-10-02 RX ADMIN — DIVALPROEX SODIUM 250 MG: 250 TABLET, DELAYED RELEASE ORAL at 20:29

## 2022-10-02 RX ADMIN — BENZTROPINE MESYLATE 0.5 MG: 0.5 TABLET ORAL at 20:28

## 2022-10-02 RX ADMIN — MIRTAZAPINE 15 MG: 15 TABLET, FILM COATED ORAL at 20:29

## 2022-10-02 RX ADMIN — ARIPIPRAZOLE 20 MG: 10 TABLET ORAL at 08:50

## 2022-10-02 RX ADMIN — HYDROXYZINE PAMOATE 50 MG: 50 CAPSULE ORAL at 20:29

## 2022-10-02 ASSESSMENT — PAIN DESCRIPTION - LOCATION: LOCATION: HEAD

## 2022-10-02 ASSESSMENT — PAIN - FUNCTIONAL ASSESSMENT: PAIN_FUNCTIONAL_ASSESSMENT: ACTIVITIES ARE NOT PREVENTED

## 2022-10-02 ASSESSMENT — PAIN SCALES - GENERAL
PAINLEVEL_OUTOF10: 0
PAINLEVEL_OUTOF10: 0
PAINLEVEL_OUTOF10: 4

## 2022-10-02 ASSESSMENT — PAIN DESCRIPTION - DESCRIPTORS: DESCRIPTORS: ACHING;THROBBING;POUNDING

## 2022-10-02 NOTE — PLAN OF CARE
Problem: Anxiety  Goal: Will report anxiety at manageable levels  Description: INTERVENTIONS:  1. Administer medication as ordered  2. Teach and rehearse alternative coping skills  3. Provide emotional support with 1:1 interaction with staff  10/2/2022 1756 by Shad Lane RN  Outcome: Progressing  10/2/2022 0811 by Paulie Perez RN  Outcome: Progressing  Flowsheets (Taken 10/2/2022 0807)  Will report anxiety at manageable levels:   Teach and rehearse alternative coping skills   Provide emotional support with 1:1 interaction with staff  10/2/2022 0420 by Dinora Morales RN  Outcome: Progressing     Problem: Depression/Self Harm  Goal: Effect of psychiatric condition will be minimized and patient will be protected from self harm  Description: INTERVENTIONS:  1. Assess impact of patient's symptoms on level of functioning, self care needs and offer support as indicated  2. Assess patient/family knowledge of depression, impact on illness and need for teaching  3. Provide emotional support, presence and reassurance  4. Assess for possible suicidal thoughts or ideation. If patient expresses suicidal thoughts or statements do not leave alone, initiate Suicide Precautions, move to a room close to the nursing station and obtain sitter  5. Initiate consults as appropriate with Mental Health Professional, Spiritual Care, Psychosocial CNS, and consider a recommendation to the LIP for a Psychiatric Consultation  10/2/2022 1756 by Shad Lane RN  Outcome: Progressing  10/2/2022 1555 by Shad Lane RN  Outcome: Progressing  10/2/2022 0811 by Paulie Perez RN  Outcome: Progressing  10/2/2022 0420 by Dinora Morales RN  Outcome: Progressing  Flowsheets (Taken 10/1/2022 1956 by Shad Lane RN)  Effect of psychiatric condition will be minimized and patient will be protected from self harm: Assess for suicidal thoughts or ideation.  If patient expresses suicidal thoughts or statements do not leave alone, initiate Suicide Precautions, move near nurse station, obtain sitter     Problem: Pain  Goal: Verbalizes/displays adequate comfort level or baseline comfort level  10/2/2022 1756 by Uma Reyes RN  Outcome: Progressing  10/2/2022 0420 by Shirin Dowd RN  Outcome: Progressing              Up and out on the unit. Pleasant and verbalizing forward goals. Denies suicidal thoughts or intent to harm self or others. No voiced delusions. Denies hallucinations.

## 2022-10-02 NOTE — PROGRESS NOTES
Attended evening relaxation group. Accepting of handout and able to practice guided imagery exercise. Was 1 of 16 in attendance.

## 2022-10-02 NOTE — PROGRESS NOTES
BEHAVIORAL HEALTH FOLLOW-UP NOTE     10/2/2022     Patient was seen and examined in person, Chart reviewed   Patient's case discussed with staff/team    Chief Complaint: Out standing in unit, intrusive     Interim History: Patient seen up on the unit, he is very pleasant, he has an odd affect, tells me that he feels he is doing better. He is more intrusive today than yesterday, he has been following me around the unit, stopping me to talk about random. Things, he later does the same to the Dr. He is discharge focused, and perseverating on his discharge. He has not had any behavioral outbursts. He states that he still having some anxiety. He denies SI/HI intent or plan he denies auditory or visual hallucinations still seems guarded seems a little preoccupied.      Appetite:   [x] Normal/Unchanged  [] Increased  [] Decreased      Sleep:       [x] Normal/Unchanged  [] Fair       [] Poor              Energy:    [x] Normal/Unchanged  [] Increased  [] Decreased        SI [] Present  [x] Absent    HI  []Present  [x] Absent     Aggression:  [] yes  [x] no    Patient is [x] able  [] unable to CONTRACT FOR SAFETY     PAST MEDICAL/PSYCHIATRIC HISTORY:   Past Medical History:   Diagnosis Date    ADD (attention deficit disorder with hyperactivity)     Anxiety     Cerebral hemorrhage (HCC)     Cerebral palsy ([facility] [address]     Depression     Schizophrenia ([facility] [address]        FAMILY/SOCIAL HISTORY:  Family History   Problem Relation Age of Onset    Mental Illness Mother     High Blood Pressure Father     Other Father     Mental Illness Brother      Social History     Socioeconomic History    Marital status: Single     Spouse name: Not on file    Number of children: 0    Years of education: 13    Highest education level: Not on file   Occupational History    Occupation: STUDENT     Comment: 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   Tobacco Use    Smoking status: Every Day     Packs/day: 1.00     Types: Cigarettes    Smokeless tobacco: Former Vaping Use    Vaping Use: Never used   Substance and Sexual Activity    Alcohol use: No    Drug use: Yes     Frequency: 7.0 times per week     Types: Marijuana Mayi Stacey)    Sexual activity: Not Currently   Other Topics Concern    Not on file   Social History Narrative    Not on file     Social Determinants of Health     Financial Resource Strain: Not on file   Food Insecurity: Not on file   Transportation Needs: Not on file   Physical Activity: Not on file   Stress: Not on file   Social Connections: Not on file   Intimate Partner Violence: Not on file   Housing Stability: Not on file           ROS:  [x] All negative/unchanged except if checked.  Explain positive(checked items) below:  [] Constitutional  [] Eyes  [] Ear/Nose/Mouth/Throat  [] Respiratory  [] CV  [] GI  []   [] Musculoskeletal  [] Skin/Breast  [] Neurological  [] Endocrine  [] Heme/Lymph  [] Allergic/Immunologic    Explanation:     MEDICATIONS:    Current Facility-Administered Medications:     benztropine (COGENTIN) tablet 0.5 mg, 0.5 mg, Oral, BID, ELAINE Logan CNP, 0.5 mg at 10/02/22 0850    ARIPiprazole (ABILIFY) tablet 20 mg, 20 mg, Oral, Daily, ELAINE Logan - CNP, 20 mg at 10/02/22 0850    divalproex (DEPAKOTE) DR tablet 250 mg, 250 mg, Oral, 2 times per day, Pieter Runner, APRN - CNP, 096 mg at 10/02/22 0850    mirtazapine (REMERON) tablet 15 mg, 15 mg, Oral, Nightly, ELAINE Logan CNP, 15 mg at 10/01/22 2051    [START ON 10/28/2022] ARIPiprazole lauroxil (ARISTADA) injection 662 mg, 662 mg, IntraMUSCular, Q28 Days, ELAINE Madsen CNP    acetaminophen (TYLENOL) tablet 650 mg, 650 mg, Oral, Q6H PRN, Ulises Quick MD    magnesium hydroxide (MILK OF MAGNESIA) 400 MG/5ML suspension 30 mL, 30 mL, Oral, Daily PRN, Ulises Quick MD    aluminum & magnesium hydroxide-simethicone (MAALOX) 200-200-20 MG/5ML suspension 30 mL, 30 mL, Oral, PRN, Ulises Quick MD    hydrOXYzine pamoate (VISTARIL) capsule 50 mg, 50 mg, Oral, TID PRN, Leonel Hull MD, 50 mg at 10/01/22 2118    haloperidol (HALDOL) tablet 5 mg, 5 mg, Oral, Q6H PRN **OR** haloperidol lactate (HALDOL) injection 5 mg, 5 mg, IntraMUSCular, Q6H PRN, Leonel Hull MD    nicotine polacrilex (NICORETTE) gum 4 mg, 4 mg, Oral, C2W PRN, Victor M Varela Chichik, APRN - CNP, 4 mg at 10/01/22 1048      Examination:  BP (!) 159/79   Pulse 97   Temp 97.9 °F (36.6 °C)   Resp 16   Ht 5' 9\" (1.753 m)   Wt 170 lb (77.1 kg)   SpO2 98%   BMI 25.10 kg/m²   Gait - steady  Medication side effects(SE): Denies    Mental Status Examination:    Level of consciousness:  within normal limits   Appearance:  fair grooming and fair hygiene  Behavior/Motor:  no abnormalities noted  Attitude toward examiner:  cooperative  Speech:  spontaneous, normal rate and normal volume   Mood: \" I am okay. \"  Affect: Flat and blunted  Thought processes: Linear without flight of ideas loose associations   thought content: Devoid of any auditory visual hallucinations delusions or other perceptual normalities. Dorsal suicidal ideation denies homicidal ideation intent or plan   cognition:  oriented to person, place, and time   Concentration intact  Insight fair   Judgement fair     ASSESSMENT:   Patient symptoms are:  [] Well controlled  [] Improving  [] Worsening  [x] No change      Diagnosis:   Principal Problem:    Bipolar 1 disorder, manic, moderate (HCC)  Active Problems:    Cannabis use disorder, severe, dependence ([facility] [address]  Resolved Problems:    * No resolved hospital problems. *      LABS:    No results for input(s): WBC, HGB, PLT in the last 72 hours. No results for input(s): NA, K, CL, CO2, BUN, CREATININE, GLUCOSE in the last 72 hours. No results for input(s): BILITOT, ALKPHOS, AST, ALT in the last 72 hours.     Lab Results   Component Value Date/Time    LABAMPH NOT DETECTED 09/27/2022 02:51 PM    LABAMPH NOT DETECTED 06/25/2012 05:48 PM    BARBSCNU NOT DETECTED 09/27/2022 02:51 PM    Billy Parekh NOT DETECTED 09/27/2022 02:51 PM    LABBENZ SEE BELOW 04/28/2014 03:37 PM    CANNAB POSITIVE 06/25/2012 05:48 PM    LABMETH NOT DETECTED 09/27/2022 02:51 PM    OPIATESCREENURINE NOT DETECTED 09/27/2022 02:51 PM    PHENCYCLIDINESCREENURINE NOT DETECTED 09/27/2022 02:51 PM    ETOH <10 09/27/2022 02:51 PM     Lab Results   Component Value Date/Time    TSH 0.885 03/30/2021 02:17 PM     Lab Results   Component Value Date    LITHIUM 0.19 (L) 06/30/2014     Lab Results   Component Value Date    VALPROATE 36 (L) 04/03/2021           Treatment Plan:  Reviewed current Medications with the patient. Risks, benefits, side effects, drug-to-drug interactions and alternatives to treatment were discussed. Collateral information:   CD evaluation  Encourage patient to attend group and other milieu activities.   Discharge planning discussed with the patient and treatment team.    Continue Abilify 20 mg daily  Depakote 250 mg twice daily  Remeron 15 mg at bedtime  Due for aristada 662 mg IM q 30 days on 10/28/22    PSYCHOTHERAPY/COUNSELING:  [x] Therapeutic interview  [x] Supportive  [] CBT  [] Ongoing  [] Other    [x] Patient continues to need, on a daily basis, active treatment furnished directly by or requiring the supervision of inpatient psychiatric personnel      Anticipated Length of stay: 3 to 7 days based on stability            Electronically signed by ELAINE Lopez CNP on 10/2/2022 at 9:07 AM

## 2022-10-02 NOTE — PLAN OF CARE
Problem: Anxiety  Goal: Will report anxiety at manageable levels  Description: INTERVENTIONS:  1. Administer medication as ordered  2. Teach and rehearse alternative coping skills  3. Provide emotional support with 1:1 interaction with staff  10/2/2022 0811 by Sukumar Salgado RN  Outcome: Progressing  Flowsheets (Taken 10/2/2022 0807)  Will report anxiety at manageable levels:   Teach and rehearse alternative coping skills   Provide emotional support with 1:1 interaction with staff  10/2/2022 0420 by Joe Greenfield RN  Outcome: Progressing     Problem: Coping  Goal: Pt/Family able to verbalize concerns and demonstrate effective coping strategies  Description: INTERVENTIONS:  1. Assist patient/family to identify coping skills, available support systems and cultural and spiritual values  2. Provide emotional support, including active listening and acknowledgement of concerns of patient and caregivers  3. Reduce environmental stimuli, as able  4. Instruct patient/family in relaxation techniques, as appropriate  5. Assess for spiritual pain/suffering and initiate Spiritual Care, Psychosocial Clinical Specialist consults as needed  10/2/2022 8492 by Sukumar Salgado RN  Outcome: Progressing  10/2/2022 0420 by Joe Greenfield RN  Outcome: Progressing     Problem: Behavior  Goal: Pt/Family maintain appropriate behavior and adhere to behavioral management agreement, if implemented  Description: INTERVENTIONS:  1. Assess patient/family's coping skills and  non-compliant behavior (including use of illegal substances)  2. Notify security of behavior or suspected illegal substances which indicate the need for search of the family and/or belongings  3. Encourage verbalization of thoughts and concerns in a socially appropriate manner  4. Utilize positive, consistent limit setting strategies supporting safety of patient, staff and others  5.  Encourage participation in the decision making process about the behavioral management agreement  6. If a visitor's behavior poses a threat to safety call refer to organization policy. 7. Initiate consult with , Psychosocial CNS, Spiritual Care as appropriate  10/2/2022 4831 by Fabián Ledesma RN  Outcome: Progressing  10/2/2022 0420 by Emile Osborne RN  Outcome: Progressing     Problem: Depression/Self Harm  Goal: Effect of psychiatric condition will be minimized and patient will be protected from self harm  Description: INTERVENTIONS:  1. Assess impact of patient's symptoms on level of functioning, self care needs and offer support as indicated  2. Assess patient/family knowledge of depression, impact on illness and need for teaching  3. Provide emotional support, presence and reassurance  4. Assess for possible suicidal thoughts or ideation. If patient expresses suicidal thoughts or statements do not leave alone, initiate Suicide Precautions, move to a room close to the nursing station and obtain sitter  5. Initiate consults as appropriate with Mental Health Professional, Spiritual Care, Psychosocial CNS, and consider a recommendation to the [facility]IP for a Psychiatric Consultation  10/2/2022 0811 by Fabián Ledesma RN  Outcome: Progressing  10/2/2022 0420 by Emile Osborne RN  Outcome: Progressing  Flowsheets (Taken 10/1/2022 1956 by Lizbeth Felder RN)  Effect of psychiatric condition will be minimized and patient will be protected from self harm: Assess for suicidal thoughts or ideation.  If patient expresses suicidal thoughts or statements do not leave alone, initiate Suicide Precautions, move near nurse station, obtain sitter     Problem: Pain  Goal: Verbalizes/displays adequate comfort level or baseline comfort level  10/2/2022 0420 by Emile Osborne RN  Outcome: Progressing     Received report from previous shift, alert and oriented x 4, pleasant and cooperative, flat affect but brightens on approach, needy, hangs around nurses station, visible on the unit, social with select peers. Pt medications compliant, attends groups, denies suicidal or homicidal ideation, contracts for safety, denies hallucinations, no somatic complaints noted, Q 15 minute checks for his/her safety and protection.

## 2022-10-02 NOTE — GROUP NOTE
Group Therapy Note    Date: 10/2/2022    Group Start Time: 0100  Group End Time: 0145  Group Topic: Cognitive Skills    [facility]    Thankful PACHECO Reina LSW        Group Therapy Note    Attendees: 16       Patient's Goal:  Pt will learn about trauma and what therapies provide help with the effects of trauma. Notes:  Pt was an active participant in group therapy. Status After Intervention:  Improved    Participation Level: Active Listener and Interactive    Participation Quality: Appropriate and Attentive      Speech:  normal      Thought Process/Content: Logical      Affective Functioning: Congruent      Mood: euthymic      Level of consciousness:  Alert, Oriented x4, and Attentive      Response to Learning: Able to verbalize current knowledge/experience and Able to verbalize/acknowledge new learning      Endings: None Reported    Modes of Intervention: Education, Support, Socialization, Exploration, Clarifying, and Problem-solving      Discipline Responsible: /Counselor      Signature:   PACHECO Brown LSW

## 2022-10-02 NOTE — PROGRESS NOTES
Attended afternoon leisure activity. Pleasant and social interacting with peers. Enjoyed football and popcorn treat. Was 1 of 15 in attendance.

## 2022-10-02 NOTE — GROUP NOTE
Group Therapy Note    Date: 10/2/2022    Group Start Time: 1000  Group End Time: 5091  Group Topic: Psychoeducation    [facility] [address]        Group Therapy Note    Attendees: 15       Notes: Active and engaged during discussion on developing a daily routine. Pleasant in sharing and relating to peers. Status After Intervention:  Improved    Participation Level:  Active Listener and Interactive    Participation Quality: Appropriate, Attentive, and Sharing      Speech:  normal      Thought Process/Content: Logical      Affective Functioning: Congruent      Mood: euthymic      Level of consciousness:  Alert, Oriented x4, and Attentive      Response to Learning: Capable of insight, Able to change behavior, and Progressing to goal      Endings: None Reported    Modes of Intervention: Education, Support, Socialization, and Exploration      Discipline Responsible: Psychoeducational Specialist      Signature:  Becki Rodríguez, [address]

## 2022-10-03 VITALS
SYSTOLIC BLOOD PRESSURE: 150 MMHG | RESPIRATION RATE: 16 BRPM | OXYGEN SATURATION: 98 % | HEART RATE: 80 BPM | HEIGHT: 69 IN | WEIGHT: 170 LBS | TEMPERATURE: 97.8 F | DIASTOLIC BLOOD PRESSURE: 79 MMHG | BODY MASS INDEX: 25.18 KG/M2

## 2022-10-03 PROCEDURE — 1240000000 HC EMOTIONAL WELLNESS R&B

## 2022-10-03 PROCEDURE — 99232 SBSQ HOSP IP/OBS MODERATE 35: CPT | Performed by: NURSE PRACTITIONER

## 2022-10-03 PROCEDURE — 6370000000 HC RX 637 (ALT 250 FOR IP): Performed by: NURSE PRACTITIONER

## 2022-10-03 PROCEDURE — 6370000000 HC RX 637 (ALT 250 FOR IP): Performed by: PSYCHIATRY & NEUROLOGY

## 2022-10-03 RX ORDER — DIVALPROEX SODIUM 500 MG/1
500 TABLET, DELAYED RELEASE ORAL EVERY 12 HOURS SCHEDULED
Status: DISCONTINUED | OUTPATIENT
Start: 2022-10-03 | End: 2022-10-04 | Stop reason: HOSPADM

## 2022-10-03 RX ORDER — DIVALPROEX SODIUM 250 MG/1
250 TABLET, DELAYED RELEASE ORAL ONCE
Status: COMPLETED | OUTPATIENT
Start: 2022-10-03 | End: 2022-10-03

## 2022-10-03 RX ADMIN — ALUMINUM HYDROXIDE, MAGNESIUM HYDROXIDE, AND SIMETHICONE 30 ML: 200; 200; 20 SUSPENSION ORAL at 17:38

## 2022-10-03 RX ADMIN — BENZTROPINE MESYLATE 0.5 MG: 0.5 TABLET ORAL at 20:56

## 2022-10-03 RX ADMIN — DIVALPROEX SODIUM 250 MG: 250 TABLET, DELAYED RELEASE ORAL at 10:42

## 2022-10-03 RX ADMIN — ARIPIPRAZOLE 20 MG: 10 TABLET ORAL at 08:56

## 2022-10-03 RX ADMIN — MIRTAZAPINE 15 MG: 15 TABLET, FILM COATED ORAL at 20:56

## 2022-10-03 RX ADMIN — BENZTROPINE MESYLATE 0.5 MG: 0.5 TABLET ORAL at 08:56

## 2022-10-03 RX ADMIN — HYDROXYZINE PAMOATE 50 MG: 50 CAPSULE ORAL at 20:56

## 2022-10-03 RX ADMIN — ACETAMINOPHEN 650 MG: 325 TABLET, FILM COATED ORAL at 13:11

## 2022-10-03 RX ADMIN — DIVALPROEX SODIUM 500 MG: 500 TABLET, DELAYED RELEASE ORAL at 20:56

## 2022-10-03 RX ADMIN — DIVALPROEX SODIUM 250 MG: 250 TABLET, DELAYED RELEASE ORAL at 08:56

## 2022-10-03 ASSESSMENT — PAIN DESCRIPTION - ORIENTATION: ORIENTATION: RIGHT;LEFT

## 2022-10-03 ASSESSMENT — PAIN SCALES - GENERAL
PAINLEVEL_OUTOF10: 0
PAINLEVEL_OUTOF10: 0

## 2022-10-03 ASSESSMENT — PAIN DESCRIPTION - LOCATION: LOCATION: HEAD;NECK

## 2022-10-03 ASSESSMENT — PAIN - FUNCTIONAL ASSESSMENT: PAIN_FUNCTIONAL_ASSESSMENT: ACTIVITIES ARE NOT PREVENTED

## 2022-10-03 ASSESSMENT — PAIN DESCRIPTION - DESCRIPTORS: DESCRIPTORS: ACHING

## 2022-10-03 NOTE — PLAN OF CARE
Problem: Anxiety  Goal: Will report anxiety at manageable levels  Description: INTERVENTIONS:  1. Administer medication as ordered  2. Teach and rehearse alternative coping skills  3. Provide emotional support with 1:1 interaction with staff  10/3/2022 1611 by Madison Bloom RN  Outcome: Progressing     Problem: Coping  Goal: Pt/Family able to verbalize concerns and demonstrate effective coping strategies  Description: INTERVENTIONS:  1. Assist patient/family to identify coping skills, available support systems and cultural and spiritual values  2. Provide emotional support, including active listening and acknowledgement of concerns of patient and caregivers  3. Reduce environmental stimuli, as able  4. Instruct patient/family in relaxation techniques, as appropriate  5. Assess for spiritual pain/suffering and initiate Spiritual Care, Psychosocial Clinical Specialist consults as needed  10/3/2022 1611 by Madison Bloom RN  Outcome: Progressing     Problem: Behavior  Goal: Pt/Family maintain appropriate behavior and adhere to behavioral management agreement, if implemented  Description: INTERVENTIONS:  1. Assess patient/family's coping skills and  non-compliant behavior (including use of illegal substances)  2. Notify security of behavior or suspected illegal substances which indicate the need for search of the family and/or belongings  3. Encourage verbalization of thoughts and concerns in a socially appropriate manner  4. Utilize positive, consistent limit setting strategies supporting safety of patient, staff and others  5. Encourage participation in the decision making process about the behavioral management agreement  6. If a visitor's behavior poses a threat to safety call refer to organization policy.   7. Initiate consult with , Psychosocial CNS, Spiritual Care as appropriate  10/3/2022 1611 by Madison Bloom RN  Outcome: Progressing     Problem: Depression/Self Harm  Goal: Effect of psychiatric condition will be minimized and patient will be protected from self harm  Description: INTERVENTIONS:  1. Assess impact of patient's symptoms on level of functioning, self care needs and offer support as indicated  2. Assess patient/family knowledge of depression, impact on illness and need for teaching  3. Provide emotional support, presence and reassurance  4. Assess for possible suicidal thoughts or ideation. If patient expresses suicidal thoughts or statements do not leave alone, initiate Suicide Precautions, move to a room close to the nursing station and obtain sitter  5. Initiate consults as appropriate with Mental Health Professional, Spiritual Care, Psychosocial CNS, and consider a recommendation to the [facility] for a Psychiatric Consultation  10/3/2022 1611 by Vanita Sheriff RN  Outcome: Progressing     Problem: Anxiety  Goal: Will report anxiety at manageable levels  Description: INTERVENTIONS:  1. Administer medication as ordered  2. Teach and rehearse alternative coping skills  3. Provide emotional support with 1:1 interaction with staff  10/3/2022 1611 by Vanita Sheriff RN  Outcome: Progressing  10/3/2022 1611 by Vanita Sheriff RN  Outcome: Progressing  10/3/2022 1033 by Carlo Lew RN  Outcome: Not Progressing  10/3/2022 0605 by Idania Hammond, RN  Outcome: Progressing   Pt is out on the unit and on approach his affect is brighter. He states that he feels better and denies any harmful ideations. He also states that the depression and anxiety are much improved.

## 2022-10-03 NOTE — PLAN OF CARE
Problem: Anxiety  Goal: Will report anxiety at manageable levels  Description: INTERVENTIONS:  1. Administer medication as ordered  2. Teach and rehearse alternative coping skills  3. Provide emotional support with 1:1 interaction with staff  10/3/2022 1033 by Yeny Rizvi RN  Outcome: Not Progressing  10/3/2022 0605 by Adriana Barnard RN  Outcome: Progressing  SPEECH PRESSURED, MOOD ANXIOUS, RESTLESS, BUT IN CONTROL. Problem: Depression/Self Harm  Goal: Effect of psychiatric condition will be minimized and patient will be protected from self harm  Description: INTERVENTIONS:  1. Assess impact of patient's symptoms on level of functioning, self care needs and offer support as indicated  2. Assess patient/family knowledge of depression, impact on illness and need for teaching  3. Provide emotional support, presence and reassurance  4. Assess for possible suicidal thoughts or ideation. If patient expresses suicidal thoughts or statements do not leave alone, initiate Suicide Precautions, move to a room close to the nursing station and obtain sitter  5. Initiate consults as appropriate with Mental Health Professional, Spiritual Care, Psychosocial CNS, and consider a recommendation to the [facility]IP for a Psychiatric Consultation  10/3/2022 1033 by Yeny Rizvi RN  Outcome: Progressing  10/3/2022 0605 by Adriana Barnard RN  Outcome: Progressing  NO SELF HARM, DENIES SUICIDAL IDEATIONS AND REPORTS READINESS FOR DISCHARGE.

## 2022-10-03 NOTE — ED PROVIDER NOTES
Melissa Frost is a 32year old male with PMH of depression, ADHD, CP, schizophrenia who presented to ED with concern for SI with plan to OD. Nothing makes symptoms better or worse symptoms moderate in severity and constant. Patient does use tobacco and marijuana. The history is provided by the patient and medical records. Review of Systems   Unable to perform ROS: Psychiatric disorder      Physical Exam  Vitals and nursing note reviewed. Constitutional:       General: He is not in acute distress. Appearance: Normal appearance. He is not ill-appearing. HENT:      Head: Normocephalic and atraumatic. Eyes:      General: No scleral icterus. Conjunctiva/sclera: Conjunctivae normal.      Pupils: Pupils are equal, round, and reactive to light. Cardiovascular:      Rate and Rhythm: Normal rate and regular rhythm. Pulmonary:      Effort: Pulmonary effort is normal.      Breath sounds: Normal breath sounds. Abdominal:      General: Bowel sounds are normal. There is no distension. Palpations: Abdomen is soft. Tenderness: There is no abdominal tenderness. There is no guarding or rebound. Musculoskeletal:      Cervical back: Normal range of motion and neck supple. No rigidity. No muscular tenderness. Right lower leg: No edema. Left lower leg: No edema. Skin:     General: Skin is warm and dry. Capillary Refill: Capillary refill takes less than 2 seconds. Coloration: Skin is not pale. Findings: No erythema or rash. Neurological:      Mental Status: He is alert and oriented to person, place, and time. Psychiatric:         Thought Content: Thought content includes suicidal ideation. Thought content includes suicidal plan.         Procedures     MDM  Number of Diagnoses or Management Options  Suicidal ideations  Diagnosis management comments: Melissa Frost is a 32year old male who presented to ED with concern for SI with plan  Patient medically cleared for admission          --------------------------------------------- PAST HISTORY ---------------------------------------------  Past Medical History:  has a past medical history of ADD (attention deficit disorder with hyperactivity), Anxiety, [facility] Cerebral palsy ([facility] Depression, and Schizophrenia ([facility] Past Surgical History:  has a past surgical history that includes Foot surgery; hernia repair; and Tonsillectomy. Social History:  reports that he has been smoking cigarettes. He has been smoking an average of 1 pack per day. He has quit using smokeless tobacco. He reports current drug use. Frequency: 7.00 times per week. Drug: Marijuana Flako Matias). He reports that he does not drink alcohol. Family History: family history includes High Blood Pressure in his father; Mental Illness in his brother and mother; Other in his father. The patients home medications have been reviewed. Allergies: Patient has no known allergies.     -------------------------------------------------- RESULTS -------------------------------------------------    LABS:  Results for orders placed or performed during the hospital encounter of 09/27/22   COVID-19 & Influenza Combo    Specimen: Nasopharyngeal Swab   Result Value Ref Range    SARS-CoV-2 RNA, RT PCR NOT DETECTED NOT DETECTED    INFLUENZA A NOT DETECTED NOT DETECTED    INFLUENZA B NOT DETECTED NOT DETECTED   Comprehensive Metabolic Panel   Result Value Ref Range    Sodium 141 132 - 146 mmol/L    Potassium 4.2 3.5 - 5.0 mmol/L    Chloride 104 98 - 107 mmol/L    CO2 24 22 - 29 mmol/L    Anion Gap 13 7 - 16 mmol/L    Glucose 155 (H) 74 - 99 mg/dL    BUN 7 6 - 20 mg/dL    Creatinine 1.0 0.7 - 1.2 mg/dL    GFR Non-African American >60 >=60 mL/min/1.73    GFR African American >60     Calcium 9.5 8.6 - 10.2 mg/dL    Total Protein 7.3 6.4 - 8.3 g/dL    Albumin 4.4 3.5 - 5.2 g/dL    Total Bilirubin 0.4 0.0 - 1.2 mg/dL    Alkaline Phosphatase 76 40 - 129 U/L    ALT 49 (H) 0 - 40 U/L    AST 31 0 - 39 U/L   CBC with Auto Differential   Result Value Ref Range    WBC 9.8 4.5 - 11.5 E9/L    RBC 5.72 3.80 - 5.80 E12/L    Hemoglobin 17.3 (H) 12.5 - 16.5 g/dL    Hematocrit 49.8 37.0 - 54.0 %    MCV 87.1 80.0 - 99.9 fL    MCH 30.2 26.0 - 35.0 pg    MCHC 34.7 (H) 32.0 - 34.5 %    RDW 12.8 11.5 - 15.0 fL    Platelets 759 276 - 301 E9/L    MPV 11.2 7.0 - 12.0 fL    Neutrophils % 76.7 43.0 - 80.0 %    Immature Granulocytes % 0.1 0.0 - 5.0 %    Lymphocytes % 20.0 20.0 - 42.0 %    Monocytes % 2.6 2.0 - 12.0 %    Eosinophils % 0.3 0.0 - 6.0 %    Basophils % 0.3 0.0 - 2.0 %    Neutrophils Absolute 7.50 (H) 1.80 - 7.30 E9/L    Immature Granulocytes # 0.01 E9/L    Lymphocytes Absolute 1.95 1.50 - 4.00 E9/L    Monocytes Absolute 0.25 0.10 - 0.95 E9/L    Eosinophils Absolute 0.03 (L) 0.05 - 0.50 E9/L    Basophils Absolute 0.03 0.00 - 0.20 E9/L   Serum Drug Screen   Result Value Ref Range    Ethanol Lvl <10 mg/dL    Acetaminophen Level <5.0 (L) 10.0 - 18.9 mcg/mL    Salicylate, Serum <3.3 0.0 - 30.0 mg/dL    TCA Scrn NEGATIVE Cutoff:300 ng/mL   Urine Drug Screen   Result Value Ref Range    Amphetamine Screen, Urine NOT DETECTED Negative <1000 ng/mL    Barbiturate Screen, Ur NOT DETECTED Negative < 200 ng/mL    Benzodiazepine Screen, Urine NOT DETECTED Negative < 200 ng/mL    Cannabinoid Scrn, Ur POSITIVE (A) Negative < 50ng/mL    Cocaine Metabolite Screen, Urine NOT DETECTED Negative < 300 ng/mL    Opiate Scrn, Ur NOT DETECTED Negative < 300ng/mL    PCP Screen, Urine NOT DETECTED Negative < 25 ng/mL    Methadone Screen, Urine NOT DETECTED Negative <300 ng/mL    Oxycodone Urine NOT DETECTED Negative <100 ng/mL    FENTANYL SCREEN, URINE NOT DETECTED Negative <1 ng/mL    Drug Screen Comment: see below    Urinalysis with Microscopic   Result Value Ref Range    Color, UA Yellow Straw/Yellow    Clarity, UA Clear Clear    Glucose, Ur Negative Negative mg/dL    Bilirubin Urine Negative Negative    Ketones, Urine TRACE (A) Negative mg/dL    Specific Gravity, UA 1.015 1.005 - 1.030    Blood, Urine Negative Negative    pH, UA 6.0 5.0 - 9.0    Protein, UA Negative Negative mg/dL    Urobilinogen, Urine 1.0 <2.0 E.U./dL    Nitrite, Urine Negative Negative    Leukocyte Esterase, Urine Negative Negative    Mucus, UA Present (A) None Seen /LPF    WBC, UA 0-1 0 - 5 /HPF    RBC, UA NONE 0 - 2 /HPF    Epithelial Cells, UA RARE /HPF    Bacteria, UA MANY (A) None Seen /HPF   Hemoglobin A1C   Result Value Ref Range    Hemoglobin A1C 5.2 4.0 - 5.6 %   Hemoglobin A1C   Result Value Ref Range    Hemoglobin A1C 5.3 4.0 - 5.6 %   Lipid panel   Result Value Ref Range    Cholesterol, Total 154 0 - 199 mg/dL    Triglycerides 97 0 - 149 mg/dL    HDL 28 >40 mg/dL    LDL Calculated 107 (H) 0 - 99 mg/dL    VLDL Cholesterol Calculated 19 mg/dL   EKG 12 Lead   Result Value Ref Range    Ventricular Rate 74 BPM    Atrial Rate 74 BPM    P-R Interval 158 ms    QRS Duration 92 ms    Q-T Interval 376 ms    QTc Calculation (Bazett) 417 ms    P Axis 56 degrees    R Axis 90 degrees    T Axis 50 degrees       RADIOLOGY:  No orders to display       EKG: This EKG is signed and interpreted by me. Rate: 74  Rhythm: Sinus  Interpretation: non-specific EKG, no ST elevation or depression  Comparison: stable as compared to patient's most recent EKG      ------------------------- NURSING NOTES AND VITALS REVIEWED ---------------------------  Date / Time Roomed:  9/27/2022  2:40 PM  ED Bed Assignment:  [id number]    The nursing notes within the ED encounter and vital signs as below have been reviewed.      Patient Vitals for the past 24 hrs:   BP Temp Pulse Resp   10/03/22 2030 (!) 150/79 97.8 °F (36.6 °C) 80 16   10/03/22 0630 133/88 97.7 °F (36.5 °C) 89 16       Oxygen Saturation Interpretation: Normal    ------------------------------------------ PROGRESS NOTES ------------------------------------------    Counseling:  I have spoken with the patient and discussed todays results, in addition to providing specific details for the plan of care and counseling regarding the diagnosis and prognosis. Their questions are answered at this time and they are agreeable with the plan of admission.    --------------------------------- ADDITIONAL PROVIDER NOTES ---------------------------------  Consultations:  Social work  This patient's ED course included: a personal history and physicial examination and re-evaluation prior to disposition    This patient has remained hemodynamically stable during their ED course. Diagnosis:  1. Suicidal ideations        Disposition:  Patient's disposition: Admit to mental health unit - medically cleared for admission  Patient's condition is stable.                 Linn Fernandes MD  10/04/22 0172

## 2022-10-03 NOTE — GROUP NOTE
Group Therapy Note    Date: 10/3/2022    Group Start Time: 1010  Group End Time: 5689  Group Topic: Psychoeducation    [facility]  [address], [address]                                                                        Group Therapy Note    Date: 10/3/2022  Type of Group: Psychoeducation    Wellness Binder Information  Module Name:  Self Esteem    Patient's Goal:  Patient will be able to id steps one can take increase his/her own self esteem on daily basis. Notes:  Pleasant and sharing in group, able to verbally participate when prompted. Status After Intervention:  Improved    Participation Level:  Active Listener    Participation Quality: Appropriate, Attentive, and Sharing      Speech:  normal      Thought Process/Content: Logical      Affective Functioning: Congruent      Mood: euthymic      Level of consciousness:  Alert, Oriented x4, and Attentive      Response to Learning: Able to verbalize/acknowledge new learning, Able to retain information, and Progressing to goal      Endings: None Reported    Modes of Intervention: Education, Support, Socialization, Exploration, and Problem-solving      Discipline Responsible: Psychoeducational Specialist      Signature:  Indira Haley

## 2022-10-03 NOTE — GROUP NOTE
Group Therapy Note    Date: 10/3/2022    Group Start Time: 1100  Group End Time: 1130  Group Topic: Psychotherapy    [facility]  [address], MSW, LSW        Group Therapy Note    Attendees: 5       Patient's Goal:  To increase socialization and improve interpersonal relationships. Notes:  Pt was an active participant in group discussion. Pt left group early. Status After Intervention:  Unchanged    Participation Level: Active Listener and Minimal    Participation Quality: Appropriate and Attentive      Speech:  normal      Thought Process/Content: Linear      Affective Functioning: Blunted      Mood: anxious      Level of consciousness:  Alert, Oriented x4, and Attentive      Response to Learning: Resistant      Endings: None Reported    Modes of Intervention: Support, Socialization, and Exploration      Discipline Responsible: /Counselor      Signature:   PACHECO Spaulding, [place]

## 2022-10-03 NOTE — PROGRESS NOTES
BEHAVIORAL HEALTH FOLLOW-UP NOTE     10/3/2022     Patient was seen and examined in person, Chart reviewed   Patient's case discussed with staff/team    Chief Complaint: Intrusive poor sleep last night outpatient unit    Interim History: Patient seen up on the unit he is extremely discharge focused. Staff reports he only slept 4 hours last night that he was at the wrist and a pacing the unit. His speech is rapid and pressured he is extremely discharge focused with poor insight and judgment.   He has a very bizarre affect      Appetite:   [x] Normal/Unchanged  [] Increased  [] Decreased      Sleep:       [x] Normal/Unchanged  [] Fair       [] Poor              Energy:    [x] Normal/Unchanged  [] Increased  [] Decreased        SI [] Present  [x] Absent    HI  []Present  [x] Absent     Aggression:  [] yes  [x] no    Patient is [x] able  [] unable to CONTRACT FOR SAFETY     PAST MEDICAL/PSYCHIATRIC HISTORY:   Past Medical History:   Diagnosis Date    ADD (attention deficit disorder with hyperactivity)     Anxiety     Cerebral hemorrhage (HCC)     Cerebral palsy ([facility] [address]     Depression     Schizophrenia ([facility]        FAMILY/SOCIAL HISTORY:  Family History   Problem Relation Age of Onset    Mental Illness Mother     High Blood Pressure Father     Other Father     Mental Illness Brother      Social History     Socioeconomic History    Marital status: Single     Spouse name: Not on file    Number of children: 0    Years of education: 13    Highest education level: Not on file   Occupational History    Occupation: STUDENT     Comment: [employer]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   Tobacco Use    Smoking status: Every Day     Packs/day: 1.00     Types: Cigarettes    Smokeless tobacco: Former   Vaping Use    Vaping Use: Never used   Substance and Sexual Activity    Alcohol use: No    Drug use: Yes     Frequency: 7.0 times per week     Types: Marijuana (Weed)    Sexual activity: Not Currently   Other Topics Concern    Not on file Social History Narrative    Not on file     Social Determinants of Health     Financial Resource Strain: Not on file   Food Insecurity: Not on file   Transportation Needs: Not on file   Physical Activity: Not on file   Stress: Not on file   Social Connections: Not on file   Intimate Partner Violence: Not on file   Housing Stability: Not on file           ROS:  [x] All negative/unchanged except if checked.  Explain positive(checked items) below:  [] Constitutional  [] Eyes  [] Ear/Nose/Mouth/Throat  [] Respiratory  [] CV  [] GI  []   [] Musculoskeletal  [] Skin/Breast  [] Neurological  [] Endocrine  [] Heme/Lymph  [] Allergic/Immunologic    Explanation:     MEDICATIONS:    Current Facility-Administered Medications:     divalproex (DEPAKOTE) DR tablet 500 mg, 500 mg, Oral, 2 times per day, ELAINE Valderrama CNP    benztropine (COGENTIN) tablet 0.5 mg, 0.5 mg, Oral, BID, ELAINE Barrientos CNP, 0.5 mg at 10/03/22 0856    ARIPiprazole (ABILIFY) tablet 20 mg, 20 mg, Oral, Daily, ELAINE Barrientos CNP, 20 mg at 10/03/22 0856    mirtazapine (REMERON) tablet 15 mg, 15 mg, Oral, Nightly, ELAINE Barrientos CNP, 15 mg at 10/02/22 2029    [START ON 10/28/2022] ARIPiprazole lauroxil (ARISTADA) injection 662 mg, 662 mg, IntraMUSCular, Q28 Days, ELAINE Madsen CNP    acetaminophen (TYLENOL) tablet 650 mg, 650 mg, Oral, Q6H PRN, Librado Garcia MD, 650 mg at 10/03/22 1311    magnesium hydroxide (MILK OF MAGNESIA) 400 MG/5ML suspension 30 mL, 30 mL, Oral, Daily PRN, Librado Garcia MD    aluminum & magnesium hydroxide-simethicone (MAALOX) 200-200-20 MG/5ML suspension 30 mL, 30 mL, Oral, PRN, Librado Garcia MD    hydrOXYzine pamoate (VISTARIL) capsule 50 mg, 50 mg, Oral, TID PRN, Librado Garcia MD, 50 mg at 10/02/22 2029    haloperidol (HALDOL) tablet 5 mg, 5 mg, Oral, Q6H PRN **OR** haloperidol lactate (HALDOL) injection 5 mg, 5 mg, IntraMUSCular, Q6H PRN, Librado Garcia MD    nicotine polacrilex (NICORETTE) gum 4 mg, 4 mg, Oral, W1U PRN, Sis Snider, APRN - CNP, 4 mg at 10/01/22 1048      Examination:  /88   Pulse 89   Temp 97.7 °F (36.5 °C)   Resp 16   Ht 5' 9\" (1.753 m)   Wt 170 lb (77.1 kg)   SpO2 98%   BMI 25.10 kg/m²   Gait - steady  Medication side effects(SE): Denies    Mental Status Examination:    Level of consciousness:  within normal limits   Appearance:  fair grooming and fair hygiene  Behavior/Motor:  no abnormalities noted  Attitude toward examiner:  cooperative  Speech:  spontaneous, normal rate and normal volume   Mood: \" I am okay. \"  Affect: Flat and blunted  Thought processes: Linear without flight of ideas loose associations   thought content: Devoid of any auditory visual hallucinations delusions or other perceptual normalities. Dorsal suicidal ideation denies homicidal ideation intent or plan   cognition:  oriented to person, place, and time   Concentration intact  Insight fair   Judgement fair     ASSESSMENT:   Patient symptoms are:  [] Well controlled  [] Improving  [] Worsening  [x] No change      Diagnosis:   Principal Problem:    Bipolar 1 disorder, manic, moderate (HCC)  Active Problems:    Cannabis use disorder, severe, dependence ([facility] [address]  Resolved Problems:    * No resolved hospital problems. *      LABS:    No results for input(s): WBC, HGB, PLT in the last 72 hours. No results for input(s): NA, K, CL, CO2, BUN, CREATININE, GLUCOSE in the last 72 hours. No results for input(s): BILITOT, ALKPHOS, AST, ALT in the last 72 hours.     Lab Results   Component Value Date/Time    LABAMPH NOT DETECTED 09/27/2022 02:51 PM    [address] NOT DETECTED 06/25/2012 05:48 PM    BARBSCNU NOT DETECTED 09/27/2022 02:51 PM    LABBENZ NOT DETECTED 09/27/2022 02:51 PM    LABBENZ SEE BELOW 04/28/2014 03:37 PM    CANNAB POSITIVE 06/25/2012 05:48 PM    LABMETH NOT DETECTED 09/27/2022 02:51 PM    OPIATESCREENURINE NOT DETECTED 09/27/2022 02:51 PM    PHENCYCLIDINESCREENURINE NOT DETECTED 09/27/2022 02:51 PM    ETOH <10 09/27/2022 02:51 PM     Lab Results   Component Value Date/Time    TSH 0.885 03/30/2021 02:17 PM     Lab Results   Component Value Date    LITHIUM 0.19 (L) 06/30/2014     Lab Results   Component Value Date    VALPROATE 36 (L) 04/03/2021           Treatment Plan:  Reviewed current Medications with the patient. Risks, benefits, side effects, drug-to-drug interactions and alternatives to treatment were discussed. Collateral information:   CD evaluation  Encourage patient to attend group and other milieu activities.   Discharge planning discussed with the patient and treatment team.    Continue Abilify 20 mg daily  Depakote 500 mg twice daily  Remeron 15 mg at bedtime  Received aristada 662 mg IM q 30 days on 10/28/22    PSYCHOTHERAPY/COUNSELING:  [x] Therapeutic interview  [x] Supportive  [] CBT  [] Ongoing  [] Other    [x] Patient continues to need, on a daily basis, active treatment furnished directly by or requiring the supervision of inpatient psychiatric personnel      Anticipated Length of stay: 3 to 7 days based on stability            Electronically signed by ELAINE Moraes CNP on 49/5/9883 at 1:57 PM

## 2022-10-04 LAB — VALPROIC ACID LEVEL: 52 MCG/ML (ref 50–100)

## 2022-10-04 PROCEDURE — 99239 HOSP IP/OBS DSCHRG MGMT >30: CPT | Performed by: NURSE PRACTITIONER

## 2022-10-04 PROCEDURE — 36415 COLL VENOUS BLD VENIPUNCTURE: CPT

## 2022-10-04 PROCEDURE — 80164 ASSAY DIPROPYLACETIC ACD TOT: CPT

## 2022-10-04 PROCEDURE — 6370000000 HC RX 637 (ALT 250 FOR IP): Performed by: NURSE PRACTITIONER

## 2022-10-04 RX ORDER — DIVALPROEX SODIUM 500 MG/1
500 TABLET, DELAYED RELEASE ORAL EVERY 12 HOURS SCHEDULED
Qty: 60 TABLET | Refills: 0 | Status: SHIPPED | OUTPATIENT
Start: 2022-10-04 | End: 2022-11-03

## 2022-10-04 RX ORDER — ARIPIPRAZOLE LAUROXIL 662 MG/2.4ML
662 INJECTION, SUSPENSION, EXTENDED RELEASE INTRAMUSCULAR
Qty: 2.4 ML | Refills: 0
Start: 2022-10-23 | End: 2022-10-24

## 2022-10-04 RX ORDER — BENZTROPINE MESYLATE 0.5 MG/1
0.5 TABLET ORAL 2 TIMES DAILY
Qty: 60 TABLET | Refills: 0 | Status: SHIPPED | OUTPATIENT
Start: 2022-10-04 | End: 2022-11-03

## 2022-10-04 RX ADMIN — BENZTROPINE MESYLATE 0.5 MG: 0.5 TABLET ORAL at 09:06

## 2022-10-04 RX ADMIN — ARIPIPRAZOLE 20 MG: 10 TABLET ORAL at 09:06

## 2022-10-04 RX ADMIN — DIVALPROEX SODIUM 500 MG: 500 TABLET, DELAYED RELEASE ORAL at 09:06

## 2022-10-04 ASSESSMENT — PAIN SCALES - GENERAL: PAINLEVEL_OUTOF10: 0

## 2022-10-04 NOTE — GROUP NOTE
Group Therapy Note    Date: 10/4/2022    Group Start Time: 1000  Group End Time: 1674  Group Topic: Cognitive Skills    [facility] ACUTE [facility]    Thankful PACHECO Reina LSW        Group Therapy Note    Attendees: 18       Patient's Goal:  Pt attended community support meeting. They identified a daily goal as, \"positive thinking. \"  We also learned about how to apologize appropriately. Notes:  Pt was an active participant in group therapy. Status After Intervention:  Unchanged    Participation Level: Active Listener    Participation Quality: Appropriate and Attentive      Speech:  normal      Thought Process/Content: Logical      Affective Functioning: Congruent      Mood: euthymic      Level of consciousness:  Alert and Attentive      Response to Learning: Able to verbalize current knowledge/experience      Endings: None Reported    Modes of Intervention: Education, Support, Socialization, Exploration, Clarifying, and Problem-solving      Discipline Responsible: /Counselor      Signature:   PACHECO Chandra, SHIKHA

## 2022-10-04 NOTE — CARE COORDINATION
SW met with pt to discuss his discharge. Pt reports feeling good today, denied SI/HI/AVH. Pt expressed feeling ready to discharge home and will have someone in his family pick him up. Pt will continue to treat with Lucia Sainz at time of discharge. Collateral was done with pt mom who reports pt can return home and he does not have access to any guns or weapons. Pt has access to family support, outpatient provider, help-seeking behaviors, safe/stable housing, communication skills. Pt cooperative, calm, with good eye contact, clear speech, and improved insight/judgement. SW contacted pt mom Jeremy Freire  (WEI signed) to pt discharge. Jeremy Freire expressed no concerns for pt coming home today and stated someone in the family will be able to pick him up. CLAUDE contacted [facility] [facility] to schedule follow up appointments. Appointments made 10/7, 10/24, and 10/25.      In order to ensure appropriate transition and discharge planning is in place, the following documents have been transmitted to [employer], as the new outpatient provider:    The d/c diagnosis was transmitted to the next care provider  The reason for hospitalization was transmitted to the next care provider  The d/c medications (dosage and indication) were transmitted to the next care provider   The continuing care plan was transmitted to the next care provider

## 2022-10-04 NOTE — PROGRESS NOTES
[facility]  Discharge Note    Pt discharged with followings belongings:   Dental Appliances: None  Vision - Corrective Lenses: Eyeglasses  Hearing Aid: None  Jewelry: None  Body Piercings Removed: N/A  Clothing: Pants, Shirt, Shorts, Socks, Undergarments (2-shorts, 3- shirts, 2- pants)  Other Valuables: Wallet, Lighter/Matches, Cigarettes, Other (Comment) (ss # card, id, insurance card, glasses case)   Valuables sent home with pt. from locker and room, along with filled prescriptions and copy of safety plan and AVS. or returned to patient. Patient educated on aftercare instructions: reviewed, signed by pt. Information faxed to  Serena Dawson by  . .Patient verbalize understanding of AVS: yes with stated potential to comply to same. Status EXAM upon discharge:  Mental Status and Behavioral Exam  Normal: Yes  Level of Assistance: Independent/Self  Facial Expression: Elevated  Affect: Congruent  Level of Consciousness: Alert  Frequency of Checks: 4 times per hour, close  Mood:Normal: pleasant  Mood: Anxious, mild  Motor Activity:Normal: yes  Eye Contact: Good  Observed Behavior: Cooperative  Sexual Misconduct History: Current - no  Preception: [place] to person, [place] to time, [place] to place, [place] to situation  Attention:Normal: yes  Thought Processes: more organized  Thought Content:Normal: yes  Depression Symptoms: No problems reported or observed. Anxiety Symptoms: Generalized  Breana Symptoms: No problems reported or observed. Hallucinations: None  Delusions: No  Memory:Normal: Yes  Memory: Other (comment) (improved)  Insight and Judgment: Yes  Insight and Judgment: Other (comment) (improved)    Tobacco Screening:  Practical Counseling, on admission, graeme X, if applicable and completed (first 3 are required if patient doesn't refuse):             ( x) Recognizing danger situations (included triggers and roadblocks)                    (x ) Coping skills (new ways to manage stress,relaxation techniques, changing routine, distraction)                                                           ( x) Basic information about quitting (benefits of quitting, techniques in how to quit, available resources  ( ) Referral for counseling faxed to Cm                                                                                                                   ( ) Patient refused counseling  ( x) Patient refused referral  ( x) Patient refused prescription upon discharge  ( ) Patient has not smoked in the last 30 days    Metabolic Screening:    Lab Results   Component Value Date    LABA1C 5.3 09/28/2022       Lab Results   Component Value Date    CHOL 154 09/28/2022    CHOL 100 03/14/2021     Lab Results   Component Value Date    TRIG 97 09/28/2022    TRIG 64 03/14/2021     Lab Results   Component Value Date    HDL 28 09/28/2022    HDL 29 03/14/2021     No components found for: [facility]  Lab Results   Component Value Date    LABVLDL 19 09/28/2022    LABVLDL 13 03/14/2021       Michael Guzman RN

## 2022-10-04 NOTE — DISCHARGE SUMMARY
DISCHARGE SUMMARY      Patient ID:  Dwight Estrella  [id number]  32 y.o.  1995    Admit date: 9/27/2022    Discharge date and time: 10/4/2022    Admitting Physician: Ulises Quick MD     Discharge Physician: Dr Opal Al MD    Discharge Diagnoses:   Patient Active Problem List   Diagnosis    Mood disorder [facility])    Cannabis use disorder, severe, dependence ([facility] [address]    Tobacco use disorder, continuous    Severe recurrent major depression with psychotic features ([facility]    Depression    Gender dysphoria in adult    Suicidal ideation    Depression, major, single episode    Bipolar 1 disorder, manic, moderate ([facility] [address]       Admission Condition: poor    Discharged Condition: stable    Admission Circumstance:  presented to the ED reported is feeling paranoid and scared having suicidal thoughts of overdosing stating \"it does not matter how I do it. \"        PAST MEDICAL/PSYCHIATRIC HISTORY:   Past Medical History:   Diagnosis Date    ADD (attention deficit disorder with hyperactivity)     Anxiety     Cerebral hemorrhage (HCC)     Cerebral palsy (HCC)     Depression     Schizophrenia (HCC)        FAMILY/SOCIAL HISTORY:  Family History   Problem Relation Age of Onset    Mental Illness Mother     High Blood Pressure Father     Other Father     Mental Illness Brother      Social History     Socioeconomic History    Marital status: Single     Spouse name: Not on file    Number of children: 0    Years of education: 13    Highest education level: Not on file   Occupational History    Occupation: STUDENT     Comment: [employer]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   Tobacco Use    Smoking status: Every Day     Packs/day: 1.00     Types: Cigarettes    Smokeless tobacco: Former   Vaping Use    Vaping Use: Never used   Substance and Sexual Activity    Alcohol use: No    Drug use: Yes     Frequency: 7.0 times per week     Types: Marijuana Mayi Forte)    Sexual activity: Not Currently   Other Topics Concern    Not on file   Social History Narrative Not on file     Social Determinants of Health     Financial Resource Strain: Not on file   Food Insecurity: Not on file   Transportation Needs: Not on file   Physical Activity: Not on file   Stress: Not on file   Social Connections: Not on file   Intimate Partner Violence: Not on file   Housing Stability: Not on file       MEDICATIONS:  No current facility-administered medications for this encounter. Current Outpatient Medications:     [START ON 10/23/2022] ARIPiprazole lauroxil (ARISTADA) 662 MG/2.4ML PRSY injection, Inject 2.4 mLs into the muscle every 28 days for 1 dose Indications: last dose was 9/23/22 at [facility] Per records last dose 9/23/22, Disp: 2.4 mL, Rfl: 0    divalproex (DEPAKOTE) 500 MG DR tablet, Take 1 tablet by mouth every 12 hours, Disp: 60 tablet, Rfl: 0    nicotine polacrilex (NICORETTE) 4 MG gum, Take 1 each by mouth every 2 hours as needed for Smoking cessation, Disp: 110 each, Rfl: 3    benztropine (COGENTIN) 0.5 MG tablet, Take 1 tablet by mouth 2 times daily, Disp: 60 tablet, Rfl: 0    hydrOXYzine pamoate (VISTARIL) 50 MG capsule, Take 50 mg by mouth nightly as needed for Itching, Disp: , Rfl:     ARIPiprazole (ABILIFY) 20 MG tablet, Take 1 tablet by mouth daily, Disp: 30 tablet, Rfl: 0    mirtazapine (REMERON) 15 MG tablet, Take 1 tablet by mouth nightly, Disp: 30 tablet, Rfl: 3    Examination:  BP (!) 150/79   Pulse 80   Temp 97.8 °F (36.6 °C)   Resp 16   Ht 5' 9\" (1.753 m)   Wt 170 lb (77.1 kg)   SpO2 98%   BMI 25.10 kg/m²   Gait - steady    HOSPITAL COURSE[de-identified]   Patient was admitted to the unit on 9/27/2022 was closely monitored for suicidal ideations. He was evaluated was treated with Depakote 500 mg twice daily, Abilify 20 mg daily and continued on his [facility] that he receives 660 mg IM every 30 days per outpatient records his last injection was given on 9/23/2020 to be due for his next injection on 10/23/2022 also Remeron 15 mg at bedtime.   Decision was made not to continue the Prozac which patient recently started as patient seemed to be activated by this medication. Medical events were insignificant and patient continued to improve on the floor. He start coming out of his room he is attending groups to socializing with peers. He never made any suicidal statements or any suicidal gestures while in the unit. Social workers obtain collateral information from patient's mother who was able to voicing concerns that she had. She reported no safety concerns no access to any guns. Paper patient was able to voice his future plan spontaneously which is of detail which includes looking for a new outpatient provider he states that his aunt goes to a different provider and he is interested in going there. Treatment team felt the patient obtain the maximum benefit from his hospitalization he was set up with an outpatient mental health agency for outpatient follow-up services. At the time of discharge patient did not show any impulsive behavior. He was up on the unit he was attending groups and socializing with peers. He vehemently denied any suicidal homicidal ideations intent or plan. He was eating well and sleeping well there are no neurovegetative signs or symptoms of depression he denied any auditory or visual hallucinations. There are no overt or covert signs of psychosis. He was appreciative of the help that he received here. This patient no longer meets criteria for inpatient hospitalization. No AVH or paranoid thoughts  No hopeless or worthless feeling  No active SI/HI  Appetite:  [x] Normal  [] Increased  [] Decreased    Sleep:       [x] Normal  [] Fair       [] Poor            Energy:    [x] Normal  [] Increased  [] Decreased     SI [] Present  [x] Absent  HI  []Present  [x] Absent   Aggression:  [] yes  [x] no  Patient is [x] able  [] unable to CONTRACT FOR SAFETY   Medication side effects(SE):  [x] None(Psych.  Meds.) [] Other      Mental Status Examination on discharge:    Level of consciousness:  within normal limits   Appearance:  well-appearing  Behavior/Motor:  no abnormalities noted  Attitude toward examiner:  attentive and good eye contact  Speech:  spontaneous, normal rate and normal volume   Mood: \"My mood is good. \"  Affect: Appropriate and pleasant  Thought processes: Linear without flight of ideas loose associations  Thought content: Devoid of any auditory visual loose [place] delusions or other perceptual normalities. Denies SI/HI intent or plan  Cognition:  oriented to person, place, and time   Concentration intact  Memory intact  Insight good   Judgement fair   Fund of Knowledge adequate      ASSESSMENT:  Patient symptoms are:  [x] Well controlled  [x] Improving  [] Worsening  [] No change    Reason for more than one antipsychotic:  [x] N/A  [] 3 Failed Monotherapy attempts (Drugs tried:)  [] Crossover to a new antipsychotic  [] Taper to Monotherapy from Polypharmacy  [] Augmentation of clozapine therapy due to treatment resistance to single therapy    Diagnosis:  Principal Problem:    Bipolar 1 disorder, manic, moderate (HCC)  Active Problems:    Cannabis use disorder, severe, dependence ([facility] [address]  Resolved Problems:    * No resolved hospital problems. *      LABS:    No results for input(s): WBC, HGB, PLT in the last 72 hours. No results for input(s): NA, K, CL, CO2, BUN, CREATININE, GLUCOSE in the last 72 hours. No results for input(s): BILITOT, ALKPHOS, AST, ALT in the last 72 hours.   Lab Results   Component Value Date/Time    LABAMPH NOT DETECTED 09/27/2022 02:51 PM    LABAMPH NOT DETECTED 06/25/2012 05:48 PM    BARBSCNU NOT DETECTED 09/27/2022 02:51 PM    LABBENZ NOT DETECTED 09/27/2022 02:51 PM    LABBENZ SEE BELOW 04/28/2014 03:37 PM    CANNAB POSITIVE 06/25/2012 05:48 PM    LABMETH NOT DETECTED 09/27/2022 02:51 PM    OPIATESCREENURINE NOT DETECTED 09/27/2022 02:51 PM    PHENCYCLIDINESCREENURINE NOT DETECTED 09/27/2022 02:51 PM    ETOH <10 09/27/2022 02:51 PM     Lab Results   Component Value Date/Time    TSH 0.885 03/30/2021 02:17 PM     Lab Results   Component Value Date    LITHIUM 0.19 (L) 06/30/2014     Lab Results   Component Value Date    VALPROATE 52 10/04/2022       RISK ASSESSMENT AT DISCHARGE: Low risk for suicide and homicide. Treatment Plan:  Reviewed current Medications with the patient. Education provided on the complaince with treatment. Risks, benefits, side effects, drug-to-drug interactions and alternatives to treatment were discussed. Encourage patient to attend outpatient follow up appointment and therapy. Patient was advised to call the outpatient provider, visit the nearest ED or call 911 if symptoms are not manageable. Patient's family member was contacted prior to the discharge.          Medication List        START taking these medications      benztropine 0.5 MG tablet  Commonly known as: COGENTIN  Take 1 tablet by mouth 2 times daily     nicotine polacrilex 4 MG gum  Commonly known as: NICORETTE  Take 1 each by mouth every 2 hours as needed for Smoking cessation            CHANGE how you take these medications      Aristada 662 MG/2.4ML Prsy injection  Generic drug: ARIPiprazole lauroxil  Inject 2.4 mLs into the muscle every 28 days for 1 dose Indications: last dose was 9/23/22 at [facility] Per records last dose 9/23/22  Start taking on: October 23, 2022  What changed: additional instructions     divalproex 500 MG DR tablet  Commonly known as: DEPAKOTE  Take 1 tablet by mouth every 12 hours  What changed:   medication strength  how much to take            CONTINUE taking these medications      ARIPiprazole 20 MG tablet  Commonly known as: ABILIFY  Take 1 tablet by mouth daily     hydrOXYzine pamoate 50 MG capsule  Commonly known as: VISTARIL     mirtazapine 15 MG tablet  Commonly known as: REMERON  Take 1 tablet by mouth nightly            STOP taking these medications      FLUoxetine 20 MG capsule  Commonly known as: PROZAC               Where to Get Your Medications        These medications were sent to [facility], [address] [address]      Phone: [phone]   benztropine 0.5 MG tablet  divalproex 500 MG DR tablet       Information about where to get these medications is not yet available    Ask your nurse or doctor about these medications  Aristada 662 MG/2.4ML Prsy injection  nicotine polacrilex 4 MG gum       Patient is counseled if he continues to abuse drugs or alcohol he could act out impulsively causing serious harm to himself or others even though may be unintentional.  He demonstrated understanding of this and has the capacity understand this    Patient is counseled hisr mental health treatment will be difficult to optimize with ongoing use of drugs or alcohol he demonstrate understanding of this as the past understand this     Patient is counseled he must remain compliant with all medications outpatient follow-up ointments    Patient is discharged home in stable condition    TIME SPEND - 35 MINUTES TO COMPLETE THE EVALUATION, DISCHARGE SUMMARY, MEDICATION RECONCILIATION AND FOLLOW UP CARE     Signed:  ELAINE Hickey - CNP  25/3/2266  1:48 PM

## 2022-10-04 NOTE — PROGRESS NOTES
CLINICAL PHARMACY NOTE: MEDS TO BEDS    Total # of Prescriptions Filled: 2   The following medications were delivered to the patient:  Benztropine mesylate 0.5 mg  Divalproex sodium 500 mg    Additional Documentation:  Delivered to [address] [place] [address]

## 2023-01-24 NOTE — PROGRESS NOTES
Attended evening relaxation group. Able to focus on breathing and creating balance in wellness recovery. Was 1 of 15 in attendance. Cheek-To-Nose Interpolation Flap Text: A decision was made to reconstruct the defect utilizing an interpolation axial flap and a staged reconstruction.  A telfa template was made of the defect.  This telfa template was then used to outline the Cheek-To-Nose Interpolation flap.  The donor area for the pedicle flap was then injected with anesthesia.  The flap was excised through the skin and subcutaneous tissue down to the layer of the underlying musculature.  The interpolation flap was carefully excised within this deep plane to maintain its blood supply.  The edges of the donor site were undermined.   The donor site was closed in a primary fashion.  The pedicle was then rotated into position and sutured.  Once the tube was sutured into place, adequate blood supply was confirmed with blanching and refill.  The pedicle was then wrapped with xeroform gauze and dressed appropriately with a telfa and gauze bandage to ensure continued blood supply and protect the attached pedicle.

## 2023-08-02 ENCOUNTER — HOSPITAL ENCOUNTER (INPATIENT)
Age: 28
LOS: 6 days | Discharge: HOME | DRG: 753 | End: 2023-08-08
Attending: EMERGENCY MEDICINE | Admitting: PSYCHIATRY & NEUROLOGY
Payer: COMMERCIAL

## 2023-08-02 DIAGNOSIS — R45.851 SUICIDAL IDEATION: Primary | ICD-10-CM

## 2023-08-02 PROBLEM — F31.9 BIPOLAR 1 DISORDER, DEPRESSED (HCC): Status: ACTIVE | Noted: 2023-08-02

## 2023-08-02 LAB
ALBUMIN SERPL-MCNC: 4.4 G/DL (ref 3.5–5.2)
ALP SERPL-CCNC: 50 U/L (ref 40–129)
ALT SERPL-CCNC: 31 U/L (ref 0–40)
AMPHET UR QL SCN: NEGATIVE
ANION GAP SERPL CALCULATED.3IONS-SCNC: 9 MMOL/L (ref 7–16)
APAP SERPL-MCNC: <5 UG/ML (ref 10–30)
AST SERPL-CCNC: 25 U/L (ref 0–39)
BARBITURATES UR QL SCN: NEGATIVE
BASOPHILS # BLD: 0.03 K/UL (ref 0–0.2)
BASOPHILS NFR BLD: 0 % (ref 0–2)
BENZODIAZ UR QL: NEGATIVE
BILIRUB SERPL-MCNC: 0.4 MG/DL (ref 0–1.2)
BUN SERPL-MCNC: 6 MG/DL (ref 6–20)
BUPRENORPHINE UR QL: NEGATIVE
CALCIUM SERPL-MCNC: 9.1 MG/DL (ref 8.6–10.2)
CANNABINOIDS UR QL SCN: POSITIVE
CHLORIDE SERPL-SCNC: 104 MMOL/L (ref 98–107)
CO2 SERPL-SCNC: 27 MMOL/L (ref 22–29)
COCAINE UR QL SCN: NEGATIVE
CREAT SERPL-MCNC: 0.9 MG/DL (ref 0.7–1.2)
DATE LAST DOSE: NORMAL
EOSINOPHIL # BLD: 0.07 K/UL (ref 0.05–0.5)
EOSINOPHILS RELATIVE PERCENT: 1 % (ref 0–6)
ERYTHROCYTE [DISTWIDTH] IN BLOOD BY AUTOMATED COUNT: 12.1 % (ref 11.5–15)
ETHANOLAMINE SERPL-MCNC: <10 MG/DL
FENTANYL UR QL: NEGATIVE
GFR SERPL CREATININE-BSD FRML MDRD: >60 ML/MIN/1.73M2
GLUCOSE SERPL-MCNC: 76 MG/DL (ref 74–99)
HCT VFR BLD AUTO: 51.6 % (ref 37–54)
HGB BLD-MCNC: 17.2 G/DL (ref 12.5–16.5)
IMM GRANULOCYTES # BLD AUTO: <0.03 K/UL (ref 0–0.58)
IMM GRANULOCYTES NFR BLD: 0 % (ref 0–5)
LYMPHOCYTES NFR BLD: 2.84 K/UL (ref 1.5–4)
LYMPHOCYTES RELATIVE PERCENT: 34 % (ref 20–42)
MCH RBC QN AUTO: 30.9 PG (ref 26–35)
MCHC RBC AUTO-ENTMCNC: 33.3 G/DL (ref 32–34.5)
MCV RBC AUTO: 92.6 FL (ref 80–99.9)
METHADONE UR QL: NEGATIVE
MONOCYTES NFR BLD: 0.53 K/UL (ref 0.1–0.95)
MONOCYTES NFR BLD: 6 % (ref 2–12)
NEUTROPHILS NFR BLD: 59 % (ref 43–80)
NEUTS SEG NFR BLD: 4.94 K/UL (ref 1.8–7.3)
OPIATES UR QL SCN: NEGATIVE
OXYCODONE UR QL SCN: NEGATIVE
PCP UR QL SCN: NEGATIVE
PLATELET # BLD AUTO: 219 K/UL (ref 130–450)
PMV BLD AUTO: 10.7 FL (ref 7–12)
POTASSIUM SERPL-SCNC: 4.4 MMOL/L (ref 3.5–5)
PROT SERPL-MCNC: 6.9 G/DL (ref 6.4–8.3)
RBC # BLD AUTO: 5.57 M/UL (ref 3.8–5.8)
SALICYLATES SERPL-MCNC: <0.3 MG/DL (ref 0–30)
SODIUM SERPL-SCNC: 140 MMOL/L (ref 132–146)
TEST INFORMATION: ABNORMAL
TME LAST DOSE: NORMAL H
TOXIC TRICYCLIC SC,BLOOD: NEGATIVE
VALPROATE SERPL-MCNC: 82 UG/ML (ref 50–100)
VANCOMYCIN DOSE: NORMAL MG
WBC OTHER # BLD: 8.4 K/UL (ref 4.5–11.5)

## 2023-08-02 PROCEDURE — 80143 DRUG ASSAY ACETAMINOPHEN: CPT

## 2023-08-02 PROCEDURE — 99285 EMERGENCY DEPT VISIT HI MDM: CPT

## 2023-08-02 PROCEDURE — 80053 COMPREHEN METABOLIC PANEL: CPT

## 2023-08-02 PROCEDURE — G0480 DRUG TEST DEF 1-7 CLASSES: HCPCS

## 2023-08-02 PROCEDURE — 80307 DRUG TEST PRSMV CHEM ANLYZR: CPT

## 2023-08-02 PROCEDURE — 93005 ELECTROCARDIOGRAM TRACING: CPT | Performed by: EMERGENCY MEDICINE

## 2023-08-02 PROCEDURE — 1240000000 HC EMOTIONAL WELLNESS R&B

## 2023-08-02 PROCEDURE — 85025 COMPLETE CBC W/AUTO DIFF WBC: CPT

## 2023-08-02 PROCEDURE — 80164 ASSAY DIPROPYLACETIC ACD TOT: CPT

## 2023-08-02 PROCEDURE — 80179 DRUG ASSAY SALICYLATE: CPT

## 2023-08-02 RX ORDER — HALOPERIDOL 5 MG/1
5 TABLET ORAL EVERY 6 HOURS PRN
Status: DISCONTINUED | OUTPATIENT
Start: 2023-08-02 | End: 2023-08-08 | Stop reason: HOSPADM

## 2023-08-02 RX ORDER — MAGNESIUM HYDROXIDE/ALUMINUM HYDROXICE/SIMETHICONE 120; 1200; 1200 MG/30ML; MG/30ML; MG/30ML
30 SUSPENSION ORAL PRN
Status: DISCONTINUED | OUTPATIENT
Start: 2023-08-02 | End: 2023-08-08 | Stop reason: HOSPADM

## 2023-08-02 RX ORDER — NICOTINE 21 MG/24HR
1 PATCH, TRANSDERMAL 24 HOURS TRANSDERMAL DAILY
Status: DISCONTINUED | OUTPATIENT
Start: 2023-08-03 | End: 2023-08-02 | Stop reason: SDUPTHER

## 2023-08-02 RX ORDER — LANOLIN ALCOHOL/MO/W.PET/CERES
3 CREAM (GRAM) TOPICAL NIGHTLY PRN
Status: DISCONTINUED | OUTPATIENT
Start: 2023-08-02 | End: 2023-08-08 | Stop reason: HOSPADM

## 2023-08-02 RX ORDER — HALOPERIDOL 5 MG/ML
5 INJECTION INTRAMUSCULAR EVERY 6 HOURS PRN
Status: DISCONTINUED | OUTPATIENT
Start: 2023-08-02 | End: 2023-08-08 | Stop reason: HOSPADM

## 2023-08-02 RX ORDER — NICOTINE 21 MG/24HR
1 PATCH, TRANSDERMAL 24 HOURS TRANSDERMAL DAILY
Status: DISCONTINUED | OUTPATIENT
Start: 2023-08-02 | End: 2023-08-03

## 2023-08-02 RX ORDER — ACETAMINOPHEN 325 MG/1
650 TABLET ORAL EVERY 6 HOURS PRN
Status: DISCONTINUED | OUTPATIENT
Start: 2023-08-02 | End: 2023-08-08 | Stop reason: HOSPADM

## 2023-08-02 RX ORDER — HYDROXYZINE PAMOATE 50 MG/1
50 CAPSULE ORAL 3 TIMES DAILY PRN
Status: DISCONTINUED | OUTPATIENT
Start: 2023-08-02 | End: 2023-08-08 | Stop reason: HOSPADM

## 2023-08-02 ASSESSMENT — LIFESTYLE VARIABLES
HOW OFTEN DO YOU HAVE A DRINK CONTAINING ALCOHOL: MONTHLY OR LESS
HOW MANY STANDARD DRINKS CONTAINING ALCOHOL DO YOU HAVE ON A TYPICAL DAY: 1 OR 2
HOW OFTEN DO YOU HAVE A DRINK CONTAINING ALCOHOL: MONTHLY OR LESS
HOW MANY STANDARD DRINKS CONTAINING ALCOHOL DO YOU HAVE ON A TYPICAL DAY: 1 OR 2

## 2023-08-02 ASSESSMENT — PAIN - FUNCTIONAL ASSESSMENT: PAIN_FUNCTIONAL_ASSESSMENT: NONE - DENIES PAIN

## 2023-08-02 ASSESSMENT — PATIENT HEALTH QUESTIONNAIRE - PHQ9
SUM OF ALL RESPONSES TO PHQ QUESTIONS 1-9: 12
SUM OF ALL RESPONSES TO PHQ QUESTIONS 1-9: 12

## 2023-08-02 ASSESSMENT — SLEEP AND FATIGUE QUESTIONNAIRES
DO YOU USE A SLEEP AID: NO
AVERAGE NUMBER OF SLEEP HOURS: 10
DO YOU HAVE DIFFICULTY SLEEPING: NO

## 2023-08-02 NOTE — ED NOTES
PT REFERRED TO ABDIAS SMITH FOR DISCUSSION WITH ON-CALL RE: POSSIBLE ADMISSION [facility]. BAC NURSE SARAH REPORTS PT NOT MEDICALLY CLEAR DUE TO NO DEPAKOTE LEVEL ORDERED BY DR. Candie Perales,[address], [place], [place]  08/02/23 2886

## 2023-08-02 NOTE — ED NOTES
Discussed case with DR Carrion If Depakote level is within normal range okay to admit to [facility] Dx bipolar 1 disorder.      Lenin Guillen RN  08/02/23 1941

## 2023-08-02 NOTE — ED NOTES
Behavioral Health Crisis Assessment      Chief Complaint: \"\"I've been getting real depressed lately. Been having suicidal thoughts thinking about my brother who committed suicide 12 years ago. \"     Mental Status Exam: Alert, oriented x4, mood depressed, affect restricted, eye contact good, speech normal in rate flow and volume, behavior is cooperative, appropriate, no signs of agitation, thought form logical, organized, though content: focused on brother's suicide 12 years ago, denies A/V hallucinations, delusions, paranoia, none noted in interview. Legal Status:  [] Voluntary:  [x] Involuntary, Issued by: ED doctor    Gender:  [x] Male [] Female [] Transgender  [] Other    Sexual Orientation:  [x] Heterosexual [] Homosexual [] Bisexual [] Other    Brief Clinical Summary: Pt is a 24 yo male who presents to the ED as a walk-in, pt is on a pink slip by the ED doctor due to statements that he is having suicidal ideation and has been focusing on brothers suicide 12 years ago. Pt reports depression has been increasing in the past several weeks and he has been experiencing suicidal ideation past few weeks. Pt denies specific plan but does report he had a suicide attempt in 2019 by overdosing on his prescribed psych meds. Pt reports he is open at [facility],  Dx: Bipolar Disorder, Rx hx: Depakote, Abilify, Vistaril, Cogentin. Pt reports he is meds compliant. Hx of previous in-patient psychiatric admissions: [facility] last was 9/27/2022, also to [facility].  Edita Fischer    Collateral Information:None    Risk Factors:  Mental health dx: Bipola Disorder  Hx of previous psych admissions  Suicide of brother 2011  Pt prior suicide attempts  Substance Use  Limited family/friend support    Protective Factors:  Has an out-patient provider  No access to weapons  Initiated this ER visit  Help-seeking behavior  Safe and stable housing  Access to essential needs  Steady income (SSI)  Good communication

## 2023-08-03 LAB
EKG ATRIAL RATE: 62 BPM
EKG P AXIS: 44 DEGREES
EKG P-R INTERVAL: 172 MS
EKG Q-T INTERVAL: 396 MS
EKG QRS DURATION: 92 MS
EKG QTC CALCULATION (BAZETT): 401 MS
EKG R AXIS: 90 DEGREES
EKG T AXIS: 51 DEGREES
EKG VENTRICULAR RATE: 62 BPM
HBA1C MFR BLD: 5.2 % (ref 4–5.6)
HDLC SERPL-MCNC: 31 MG/DL

## 2023-08-03 PROCEDURE — 6370000000 HC RX 637 (ALT 250 FOR IP): Performed by: NURSE PRACTITIONER

## 2023-08-03 PROCEDURE — 1240000000 HC EMOTIONAL WELLNESS R&B

## 2023-08-03 PROCEDURE — 83036 HEMOGLOBIN GLYCOSYLATED A1C: CPT

## 2023-08-03 PROCEDURE — 93010 ELECTROCARDIOGRAM REPORT: CPT | Performed by: INTERNAL MEDICINE

## 2023-08-03 PROCEDURE — 83718 ASSAY OF LIPOPROTEIN: CPT

## 2023-08-03 PROCEDURE — 36415 COLL VENOUS BLD VENIPUNCTURE: CPT

## 2023-08-03 RX ORDER — MIRTAZAPINE 15 MG/1
15 TABLET, FILM COATED ORAL NIGHTLY
Status: DISCONTINUED | OUTPATIENT
Start: 2023-08-03 | End: 2023-08-08 | Stop reason: HOSPADM

## 2023-08-03 RX ORDER — BENZTROPINE MESYLATE 0.5 MG/1
0.5 TABLET ORAL 2 TIMES DAILY
Status: DISCONTINUED | OUTPATIENT
Start: 2023-08-03 | End: 2023-08-08 | Stop reason: HOSPADM

## 2023-08-03 RX ORDER — DIVALPROEX SODIUM 500 MG/1
500 TABLET, DELAYED RELEASE ORAL EVERY 12 HOURS SCHEDULED
Status: DISCONTINUED | OUTPATIENT
Start: 2023-08-03 | End: 2023-08-08 | Stop reason: HOSPADM

## 2023-08-03 RX ORDER — ARIPIPRAZOLE 10 MG/1
20 TABLET ORAL DAILY
Status: DISCONTINUED | OUTPATIENT
Start: 2023-08-03 | End: 2023-08-08 | Stop reason: HOSPADM

## 2023-08-03 RX ORDER — POLYETHYLENE GLYCOL 3350 17 G
2 POWDER IN PACKET (EA) ORAL
Status: DISCONTINUED | OUTPATIENT
Start: 2023-08-03 | End: 2023-08-08 | Stop reason: HOSPADM

## 2023-08-03 RX ORDER — ARIPIPRAZOLE LAUROXIL 662 MG/2.4ML
662 INJECTION, SUSPENSION, EXTENDED RELEASE INTRAMUSCULAR ONCE
COMMUNITY
Start: 2023-07-18

## 2023-08-03 RX ADMIN — MIRTAZAPINE 15 MG: 15 TABLET, FILM COATED ORAL at 21:16

## 2023-08-03 RX ADMIN — BENZTROPINE MESYLATE 0.5 MG: 0.5 TABLET ORAL at 21:17

## 2023-08-03 RX ADMIN — ARIPIPRAZOLE 20 MG: 10 TABLET ORAL at 15:00

## 2023-08-03 RX ADMIN — BENZTROPINE MESYLATE 0.5 MG: 0.5 TABLET ORAL at 15:02

## 2023-08-03 RX ADMIN — DIVALPROEX SODIUM 500 MG: 500 TABLET, DELAYED RELEASE ORAL at 21:17

## 2023-08-03 ASSESSMENT — LIFESTYLE VARIABLES
HOW MANY STANDARD DRINKS CONTAINING ALCOHOL DO YOU HAVE ON A TYPICAL DAY: 1 OR 2
HOW OFTEN DO YOU HAVE A DRINK CONTAINING ALCOHOL: MONTHLY OR LESS

## 2023-08-03 ASSESSMENT — PATIENT HEALTH QUESTIONNAIRE - PHQ9: SUM OF ALL RESPONSES TO PHQ QUESTIONS 1-9: 10

## 2023-08-03 ASSESSMENT — SLEEP AND FATIGUE QUESTIONNAIRES
AVERAGE NUMBER OF SLEEP HOURS: 10
SLEEP PATTERN: DIFFICULTY FALLING ASLEEP
DO YOU USE A SLEEP AID: YES
DO YOU HAVE DIFFICULTY SLEEPING: NO

## 2023-08-03 NOTE — BH NOTE
[facility]  Admission Note     Admission Type:   Admission Type: Involuntary    Reason for admission:  Reason for Admission: \"I WAS HAVING SUICIDAL THOUGHTS, DIDN'T HAVE A PLAN, 3 PAST ATTEMPTS LONG TIME AGO\"      Addictive Behavior:   Addictive Behavior  In the Past 3 Months, Have You Felt or Has Someone Told You That You Have a Problem With  : None    Medical Problems:   Past Medical History:   Diagnosis Date    ADD (attention deficit disorder with hyperactivity)     Anxiety     Cerebral hemorrhage (HCC)     Cerebral palsy (HCC)     Depression     Dyskinesia     Schizophrenia (HCC)        Status EXAM:  Mental Status and Behavioral Exam  Normal: Yes  Level of Assistance: Independent/Self  Facial Expression: Sad, Worried  Affect: Blunt  Level of Consciousness: Alert  Frequency of Checks: 4 times per hour, close  Mood:Normal: No  Mood: Depressed, Anxious, Despair, Worthless, low self-esteem, Sad, Helpless, Guilty, Ashamed/humiliated  Motor Activity:Normal: Yes  Eye Contact: Fair  Observed Behavior: Withdrawn, Cooperative  Sexual Misconduct History: Current - no  Preception: [place] to person, [place] to time, [place] to place, [place] to situation  Attention:Normal: No  Attention: Unable to concentrate, Distractible  Thought Processes: Blocking, Circumstantial  Thought Content:Normal: No  Thought Content: Paranoia, Poverty of content  Depression Symptoms: Impaired concentration, Feelings of worthlessness, Feelings of hopelessess, Isolative  Anxiety Symptoms: Generalized  Breana Symptoms: Poor judgment  Hallucinations: None  Delusions: No  Memory:Normal: No  Memory: Poor recent, Poor remote  Insight and Judgment: No  Insight and Judgment: Poor judgment, Poor insight    Tobacco Screening:  Practical Counseling, on admission, graeme X, if applicable and completed (first 3 are required if patient doesn't refuse):             ( X) Recognizing danger situations (included triggers and roadblocks)

## 2023-08-03 NOTE — H&P
Department of Psychiatry  History and Physical - Adult       Patient personally seen and examined by me and mental status exam performed. I agree the below assessment by the medical student. Psychomotor evaluation revealed no agitation retardation any abnormal movements. His eye contact is fair his speech is normal rate rhythm and tone. His mood is \"I feel depressed. \"  Affect is mood incongruent flat and blunted. His thought process is linear without flight of ideas loose associations. Thought contents devoid of any auditory visual hallucinations delusions or any other perceptual abnormalities. He denies suicidal homicidal ideations intent or plan. He is able to repeat words and phrases, his vocabulary is intact he has knowledge of current events and past events recent remote memory are intact   impulse control is adequate cognitive function peers to be his baseline his insight judgment is fair he is alert oriented time place and person              CHIEF COMPLAINT: \"I am suicidal\"    Patient was seen after discussing with the treatment team and reviewing the chart    CIRCUMSTANCES OF ADMISSION: Voluntarily came into the ED as a walk-in, brought by uncle    HISTORY OF PRESENT ILLNESS:      The patient is a 25 y.o. white unemployed never  no kids male with significant past history of ADHD, Anxiety, Cerebral hemorrhage, Cerebral Palsy, Bipolar, and Schizophrenia presented to the ED voluntarily by his Uncle due to having suicidal thoughts for a couple weeks. Patient is currently on psychiatric mediations (Depakote, Abilify, Cogentin) and is compliant. In the ED, his urine drug screen showed cannibals, Qtc of 401. Patient was medically cleared and admitted to [facility] adult psychiatry unit for further psychiatric assessment, stabilization, and treatment. Upon further evaluation, patient notes that he has been having suicidal thoughts for a several weeks.  Patient notes that his thoughts mainly include

## 2023-08-03 NOTE — GROUP NOTE
Group Therapy Note    Date: 8/3/2023    Group Start Time: 1100  Group End Time: 1130  Group Topic: Cognitive Skills    [facility]  1416 PACHECO Bliss, LSW        Group Therapy Note    Attendees: 9       Patient's Goal:  Pt will be able to identify their boundaries and learn ways to set healthy boundaries. Notes:  Pt participated minimally in group discussion. Status After Intervention:  Improved    Participation Level: Active Listener and Interactive    Participation Quality: Appropriate, Attentive, Sharing, and Supportive      Speech:  normal      Thought Process/Content: Logical  Linear      Affective Functioning: Congruent      Mood: anxious and depressed      Level of consciousness:  Alert, Oriented x4, and Attentive      Response to Learning: Able to verbalize current knowledge/experience, Able to verbalize/acknowledge new learning, Able to retain information, Capable of insight, and Progressing to goal      Endings: None Reported    Modes of Intervention: Education, Support, Socialization, Exploration, Clarifying, and Problem-solving      Discipline Responsible: /Counselor      Signature:   PACHECO Pak, [facility]

## 2023-08-03 NOTE — PLAN OF CARE
Patient is alert and oriented x 4. Denies pain. No noted signs or symptoms of distress. Denies HI, A/V/H, or thoughts of self harm when asked. Patient does endorse vague SI that is intermittent and without a plan, patient states sometimes he just wishes he were dead. Rates Anxiety at 5/10 and Depression at 5/10. Attended on unit groups this shift. Pleasant, polite, and cooperative. Flat, blunted Affect. Selectively social and appropriate with peers and staff. Appears well groomed/neat, room Is clean. Goal for the day was - \"Go to groups and participate\". Up for all meals. Will continue to monitor for safety q 15 minute safety rounds and environmental assessments.
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
## 2023-08-04 PROCEDURE — 6370000000 HC RX 637 (ALT 250 FOR IP): Performed by: NURSE PRACTITIONER

## 2023-08-04 PROCEDURE — 1240000000 HC EMOTIONAL WELLNESS R&B

## 2023-08-04 PROCEDURE — 6370000000 HC RX 637 (ALT 250 FOR IP): Performed by: PSYCHIATRY & NEUROLOGY

## 2023-08-04 RX ADMIN — DIVALPROEX SODIUM 500 MG: 500 TABLET, DELAYED RELEASE ORAL at 21:55

## 2023-08-04 RX ADMIN — MIRTAZAPINE 15 MG: 15 TABLET, FILM COATED ORAL at 21:55

## 2023-08-04 RX ADMIN — BENZTROPINE MESYLATE 0.5 MG: 0.5 TABLET ORAL at 11:44

## 2023-08-04 RX ADMIN — NICOTINE POLACRILEX 2 MG: 2 LOZENGE ORAL at 10:06

## 2023-08-04 RX ADMIN — BENZTROPINE MESYLATE 0.5 MG: 0.5 TABLET ORAL at 21:55

## 2023-08-04 RX ADMIN — DIVALPROEX SODIUM 500 MG: 500 TABLET, DELAYED RELEASE ORAL at 11:45

## 2023-08-04 RX ADMIN — ARIPIPRAZOLE 20 MG: 10 TABLET ORAL at 09:37

## 2023-08-04 ASSESSMENT — PAIN SCALES - GENERAL: PAINLEVEL_OUTOF10: 0

## 2023-08-04 NOTE — PLAN OF CARE
Pt resting in room with eyes open. Pt denies SI, HI and AVH. Pt came out of room for snack. Pt requested to be switched from the nicotine patch, to the nicotine lozenge and states he does not have a patch on currently. Problem: Behavior  Goal: Pt/Family maintain appropriate behavior and adhere to behavioral management agreement, if implemented  Description: INTERVENTIONS:  1. Assess patient/family's coping skills and  non-compliant behavior (including use of illegal substances)  2. Notify security of behavior or suspected illegal substances which indicate the need for search of the family and/or belongings  3. Encourage verbalization of thoughts and concerns in a socially appropriate manner  4. Utilize positive, consistent limit setting strategies supporting safety of patient, staff and others  5. Encourage participation in the decision making process about the behavioral management agreement  6. If a visitor's behavior poses a threat to safety call refer to organization policy. 7. Initiate consult with , Psychosocial CNS, Spiritual Care as appropriate  8/3/2023 2204 by Nathaniel Purvis RN  Outcome: Progressing     Problem: Depression/Self Harm  Goal: Effect of psychiatric condition will be minimized and patient will be protected from self harm  Description: INTERVENTIONS:  1. Assess impact of patient's symptoms on level of functioning, self care needs and offer support as indicated  2. Assess patient/family knowledge of depression, impact on illness and need for teaching  3. Provide emotional support, presence and reassurance  4. Assess for possible suicidal thoughts or ideation. If patient expresses suicidal thoughts or statements do not leave alone, initiate Suicide Precautions, move to a room close to the nursing station and obtain sitter  5.  Initiate consults as appropriate with Mental Health Professional, Spiritual Care, Psychosocial CNS, and consider a recommendation to the LIP for a

## 2023-08-04 NOTE — GROUP NOTE
Group Therapy Note    Date: 8/4/2023    Group Start Time: 2833  Group End Time: 4098  Group Topic: Psychoeducation    [facility] [address]                                                                        Group Therapy Note    Date: 8/4/2023  Module Name:  coping skills   Patient's Goal:  Patient will be able to id different coping skills that can be beneficial to each patient. Notes: Patient observed as polite and engaged in group, able to share current coping skills, and accepting of handout. Status After Intervention:  Improved    Participation Level:  Active Listener and Interactive    Participation Quality: Appropriate, Attentive, Sharing, and Supportive      Speech:  normal      Thought Process/Content: Logical      Affective Functioning: Congruent      Mood: euthymic      Level of consciousness:  Alert, Oriented x4, and Attentive      Response to Learning: Able to verbalize/acknowledge new learning, Able to retain information, and Progressing to goal      Endings: None Reported    Modes of Intervention: Education, Support, Socialization, Exploration, and Problem-solving      Discipline Responsible: Psychoeducational Specialist      Signature:  Faith Ramires

## 2023-08-04 NOTE — PLAN OF CARE
Problem: Behavior  Goal: Pt/Family maintain appropriate behavior and adhere to behavioral management agreement, if implemented  Description: INTERVENTIONS:  1. Assess patient/family's coping skills and  non-compliant behavior (including use of illegal substances)  2. Notify security of behavior or suspected illegal substances which indicate the need for search of the family and/or belongings  3. Encourage verbalization of thoughts and concerns in a socially appropriate manner  4. Utilize positive, consistent limit setting strategies supporting safety of patient, staff and others  5. Encourage participation in the decision making process about the behavioral management agreement  6. If a visitor's behavior poses a threat to safety call refer to organization policy. 7. Initiate consult with , Psychosocial CNS, Spiritual Care as appropriate  Outcome: Progressing     Problem: Depression/Self Harm  Goal: Effect of psychiatric condition will be minimized and patient will be protected from self harm  Description: INTERVENTIONS:  1. Assess impact of patient's symptoms on level of functioning, self care needs and offer support as indicated  2. Assess patient/family knowledge of depression, impact on illness and need for teaching  3. Provide emotional support, presence and reassurance  4. Assess for possible suicidal thoughts or ideation. If patient expresses suicidal thoughts or statements do not leave alone, initiate Suicide Precautions, move to a room close to the nursing station and obtain sitter  5. Initiate consults as appropriate with Mental Health Professional, Spiritual Care, Psychosocial CNS, and consider a recommendation to the LIP for a Psychiatric Consultation  Outcome: Progressing     Problem: Anxiety  Goal: Will report anxiety at manageable levels  Description: INTERVENTIONS:  1. Administer medication as ordered  2. Teach and rehearse alternative coping skills  3.  Provide emotional support with

## 2023-08-04 NOTE — GROUP NOTE
Group Therapy Note    Date: 8/4/2023    Group Start Time: 4937  Group End Time: 9593  Group Topic: Psychoeducation    [facility] [address]                                                                        Group Therapy Note    Date: 8/4/2023    Number of Participants: 11    Type of Group: Recreational    Wellness Binder Information  Module Name:  Music Trivia and Favorite Songs    Patient's Goal:  Patient will be able to participate in music trivia and share his/her personal song. Notes:  Pleasant and sharing in group, able to listen and participate appropriately. Status After Intervention:  Improved    Participation Level:  Active Listener and Interactive    Participation Quality: Appropriate, Attentive, and Sharing      Speech:  normal      Thought Process/Content: Logical      Affective Functioning: Congruent      Mood: euthymic      Level of consciousness:  Alert, Oriented x4, and Attentive      Response to Learning: Able to verbalize/acknowledge new learning, Able to retain information, and Progressing to goal      Endings: None Reported    Modes of Intervention: Socialization, Exploration, Activity, and Media      Discipline Responsible: Psychoeducational Specialist      Signature:  Chely Bobo

## 2023-08-04 NOTE — GROUP NOTE
Group Therapy Note    Date: 8/4/2023    Group Start Time: 3184  Group End Time: 2734  Group Topic: Cognitive Skills    [facility]    PACHECO Angeles LSW        Group Therapy Note    Attendees: 14       Patient's Goal: to participate in group discussion on self-care. Notes: Pt was an active listener in group discussion. Status After Intervention:  Unchanged    Participation Level:  Active Listener    Participation Quality: Appropriate and Attentive      Speech:  normal      Thought Process/Content: Logical      Affective Functioning: Congruent      Mood: anxious      Level of consciousness:  Alert and Oriented x4      Response to Learning: Able to verbalize current knowledge/experience      Endings: None Reported    Modes of Intervention: Education, Support, Socialization, Exploration, Clarifying, and Problem-solving      Discipline Responsible: /Counselor      Signature:  PACHECO Isaacs LSW

## 2023-08-04 NOTE — CARE COORDINATION
Pt was seen during treatment team. Pt states that he is doing better today. He reports that he lives with his parents and plans to return at discharge. Pt reports that living with his parents has been going well. Pt reports that he likes playing video games to help distract him as needed. Pt becomes tearful when discussing his brother who committed suicide 12 years ago in October. He states that he still gets to see his brother's children, which makes him happy. Pt denied SI/HI/AVH, is appropriate and shares good eye contact. Pt plans to return home with parents, follow with [facility] for outpatient services.

## 2023-08-04 NOTE — DISCHARGE INSTRUCTIONS
Follow up for Tobacco Cessation at:    [facility]                                 Date:  Friday 8/11 at [facility].  [address]   ([address] [place] [address] elevators to 7th floor)   Phone: [phone]   Fax: [phone]

## 2023-08-05 PROCEDURE — 6370000000 HC RX 637 (ALT 250 FOR IP): Performed by: PSYCHIATRY & NEUROLOGY

## 2023-08-05 PROCEDURE — 6370000000 HC RX 637 (ALT 250 FOR IP): Performed by: NURSE PRACTITIONER

## 2023-08-05 PROCEDURE — 1240000000 HC EMOTIONAL WELLNESS R&B

## 2023-08-05 RX ADMIN — MELATONIN 3 MG ORAL TABLET 3 MG: 3 TABLET ORAL at 23:14

## 2023-08-05 RX ADMIN — HYDROXYZINE PAMOATE 50 MG: 50 CAPSULE ORAL at 18:25

## 2023-08-05 RX ADMIN — MIRTAZAPINE 15 MG: 15 TABLET, FILM COATED ORAL at 20:40

## 2023-08-05 RX ADMIN — BENZTROPINE MESYLATE 0.5 MG: 0.5 TABLET ORAL at 20:40

## 2023-08-05 RX ADMIN — DIVALPROEX SODIUM 500 MG: 500 TABLET, DELAYED RELEASE ORAL at 08:49

## 2023-08-05 RX ADMIN — ARIPIPRAZOLE 20 MG: 10 TABLET ORAL at 08:49

## 2023-08-05 RX ADMIN — BENZTROPINE MESYLATE 0.5 MG: 0.5 TABLET ORAL at 08:49

## 2023-08-05 RX ADMIN — NICOTINE POLACRILEX 2 MG: 2 LOZENGE ORAL at 08:50

## 2023-08-05 RX ADMIN — DIVALPROEX SODIUM 500 MG: 500 TABLET, DELAYED RELEASE ORAL at 20:40

## 2023-08-05 ASSESSMENT — PAIN SCALES - GENERAL: PAINLEVEL_OUTOF10: 0

## 2023-08-05 NOTE — PLAN OF CARE
Problem: Behavior  Goal: Pt/Family maintain appropriate behavior and adhere to behavioral management agreement, if implemented  Description: INTERVENTIONS:  1. Assess patient/family's coping skills and  non-compliant behavior (including use of illegal substances)  2. Notify security of behavior or suspected illegal substances which indicate the need for search of the family and/or belongings  3. Encourage verbalization of thoughts and concerns in a socially appropriate manner  4. Utilize positive, consistent limit setting strategies supporting safety of patient, staff and others  5. Encourage participation in the decision making process about the behavioral management agreement  6. If a visitor's behavior poses a threat to safety call refer to organization policy. 7. Initiate consult with , Psychosocial CNS, Spiritual Care as appropriate  8/4/2023 2036 by Melchor Fitch RN  Outcome: Progressing     Problem: Depression/Self Harm  Goal: Effect of psychiatric condition will be minimized and patient will be protected from self harm  Description: INTERVENTIONS:  1. Assess impact of patient's symptoms on level of functioning, self care needs and offer support as indicated  2. Assess patient/family knowledge of depression, impact on illness and need for teaching  3. Provide emotional support, presence and reassurance  4. Assess for possible suicidal thoughts or ideation. If patient expresses suicidal thoughts or statements do not leave alone, initiate Suicide Precautions, move to a room close to the nursing station and obtain sitter  5. Initiate consults as appropriate with Mental Health Professional, Spiritual Care, Psychosocial CNS, and consider a recommendation to the LIP for a Psychiatric Consultation  8/4/2023 2036 by Melchor Fitch RN  Outcome: Progressing     Problem: Anxiety  Goal: Will report anxiety at manageable levels  Description: INTERVENTIONS:  1. Administer medication as ordered  2.  Teach and

## 2023-08-05 NOTE — GROUP NOTE
Group Therapy Note    Date: 8/5/2023    Group Start Time: 0230  Group End Time: 0330  Group Topic: Recreational    [facility]    Whit Noland                                                                        Group Therapy Note    Date: 8/5/2023  Start Time: 1430  End Time:  6264  Number of Participants: 11    Type of Group: Recreational    Name: Music and Emotions    Patient's Goal: Identify different songs that validate different emotions. Increase awareness of how music affects mood. Notes: CTRS educated patients on how music affects mental health. Patients chose an appropriate song to listen to. Status After Intervention:  Improved    Participation Level:  Active Listener and Interactive    Participation Quality: Appropriate, Attentive, and Sharing    Speech:  normal    Thought Process/Content: Logical  Linear    Affective Functioning: Congruent    Mood:  Appropriate    Level of consciousness:  Alert and Attentive    Response to Learning: Able to verbalize current knowledge/experience, Able to verbalize/acknowledge new learning, Able to retain information, and Progressing to goal    Endings: None Reported    Modes of Intervention: Activity    Discipline Responsible: Psychoeducational Specialist      Signature:  JAE Noland

## 2023-08-05 NOTE — GROUP NOTE
Group Therapy Note    Date: 8/5/2023  Start Time: 3334  End Time:  1854  Number of Participants: 10    Type of Group: Psychoeducation    Name: Communication Techniques    Patient's Goal: Identify differences between communication styles (passive, aggressive, assertive). Increase awareness of personal communication style. Notes: CTRS educated patients on effective communication, types of communication styles. Patients filled out worksheet on \"I\" statements. Status After Intervention:  Improved    Participation Level:  Active Listener and Interactive    Participation Quality: Appropriate, Attentive, Sharing, and Supportive    Speech:  normal    Thought Process/Content: Logical  Linear    Affective Functioning: Congruent    Mood:  Appropriate    Level of consciousness:  Alert and Attentive    Response to Learning: Able to verbalize current knowledge/experience and Able to verbalize/acknowledge new learning    Endings: None Reported    Modes of Intervention: Education    Discipline Responsible: Psychoeducational Specialist      Signature:  Alejandro Back

## 2023-08-05 NOTE — GROUP NOTE
Group Therapy Note    Date: 8/5/2023    Group Start Time: 1110  Group End Time: 1140  Group Topic: Cognitive Skills    [facility]    PACHECO Hernandez LSW        Group Therapy Note    Attendees: 10       Patient's Goal:  Pt will be able to verbalize different forms of self-care. Notes:  Pt made connections and participated in group. Status After Intervention:  Improved    Participation Level:  Active Listener and Interactive    Participation Quality: Appropriate, Attentive, Sharing, and Supportive      Speech:  normal      Thought Process/Content: Logical  Linear      Affective Functioning: Congruent      Mood: euthymic      Level of consciousness:  Alert, Oriented x4, and Attentive      Response to Learning: Able to verbalize current knowledge/experience      Endings: None Reported    Modes of Intervention: Education, Support, Socialization, and Exploration      Discipline Responsible: /Counselor      Signature:  PACHECO Hernandez LSW

## 2023-08-05 NOTE — PLAN OF CARE
Patient denies SI/HI/Hallucinations. Patient appears guarded but engages in conversations and brightens when you talk with him. Patient doesn't have have auditory and visual hallucinations regarding brothers death but was triggered by the thoughts of it Patient has remained in control of behaviors. Patient has been in control of behaviors. Patient is medication compliant. No active distress noted Respirations even and unlabored. Will continue to monitored and will intervene as needed. Problem: Behavior  Goal: Pt/Family maintain appropriate behavior and adhere to behavioral management agreement, if implemented  Description: INTERVENTIONS:  1. Assess patient/family's coping skills and  non-compliant behavior (including use of illegal substances)  2. Notify security of behavior or suspected illegal substances which indicate the need for search of the family and/or belongings  3. Encourage verbalization of thoughts and concerns in a socially appropriate manner  4. Utilize positive, consistent limit setting strategies supporting safety of patient, staff and others  5. Encourage participation in the decision making process about the behavioral management agreement  6. If a visitor's behavior poses a threat to safety call refer to organization policy. 7. Initiate consult with , Psychosocial CNS, Spiritual Care as appropriate  Outcome: Progressing     Problem: Depression/Self Harm  Goal: Effect of psychiatric condition will be minimized and patient will be protected from self harm  Description: INTERVENTIONS:  1. Assess impact of patient's symptoms on level of functioning, self care needs and offer support as indicated  2. Assess patient/family knowledge of depression, impact on illness and need for teaching  3. Provide emotional support, presence and reassurance  4. Assess for possible suicidal thoughts or ideation.  If patient expresses suicidal thoughts or statements do not leave alone, initiate

## 2023-08-06 PROCEDURE — 1240000000 HC EMOTIONAL WELLNESS R&B

## 2023-08-06 PROCEDURE — 6370000000 HC RX 637 (ALT 250 FOR IP): Performed by: PSYCHIATRY & NEUROLOGY

## 2023-08-06 PROCEDURE — 6370000000 HC RX 637 (ALT 250 FOR IP): Performed by: NURSE PRACTITIONER

## 2023-08-06 RX ADMIN — BENZTROPINE MESYLATE 0.5 MG: 0.5 TABLET ORAL at 09:13

## 2023-08-06 RX ADMIN — MIRTAZAPINE 15 MG: 15 TABLET, FILM COATED ORAL at 20:52

## 2023-08-06 RX ADMIN — BENZTROPINE MESYLATE 0.5 MG: 0.5 TABLET ORAL at 20:52

## 2023-08-06 RX ADMIN — DIVALPROEX SODIUM 500 MG: 500 TABLET, DELAYED RELEASE ORAL at 20:52

## 2023-08-06 RX ADMIN — ARIPIPRAZOLE 20 MG: 10 TABLET ORAL at 09:14

## 2023-08-06 RX ADMIN — DIVALPROEX SODIUM 500 MG: 500 TABLET, DELAYED RELEASE ORAL at 09:13

## 2023-08-06 RX ADMIN — MELATONIN 3 MG ORAL TABLET 3 MG: 3 TABLET ORAL at 20:52

## 2023-08-06 RX ADMIN — HYDROXYZINE PAMOATE 50 MG: 50 CAPSULE ORAL at 09:16

## 2023-08-06 RX ADMIN — HYDROXYZINE PAMOATE 50 MG: 50 CAPSULE ORAL at 16:06

## 2023-08-06 NOTE — GROUP NOTE
Group Therapy Note    Date: 8/6/2023    Group Start Time: 1105  Group End Time: 1130  Group Topic: Cognitive Skills    [facility]    PACHECO Sesay LSW        Group Therapy Note    Attendees: 13       Patient's Goal:  Pt will be able to verbalize understanding of how to create new habits. Notes:  Pt made connections and participated in group. Status After Intervention:  Improved    Participation Level:  Active Listener and Interactive    Participation Quality: Appropriate, Attentive, Sharing, and Supportive      Speech:  normal      Thought Process/Content: Logical  Linear      Affective Functioning: Congruent      Mood: euthymic      Level of consciousness:  Alert, Oriented x4, and Attentive      Response to Learning: Able to verbalize current knowledge/experience      Endings: None Reported    Modes of Intervention: Education, Support, Socialization, and Exploration      Discipline Responsible: /Counselor      Signature:  PACHECO Sesay LSW

## 2023-08-06 NOTE — GROUP NOTE
Group Therapy Note    Date: 8/6/2023    Group Start Time: 1000  Group End Time: 5807  Group Topic: Psychoeducation    [facility]    Alejandro Parekh                                                                        Group Therapy Note    Date: 8/6/2023  Start Time: 1000  End Time:  1040  Number of Participants: 12    Type of Group: Psychoeducation    Name: Boundaries    Patient's Goal: Identify different types of boundaries and increase awareness of personal boundaries used. Increase awareness of responses to use to initiate healthy boundaries. Notes: CTRS educated patients on rigid, porous and healthy boundaries. Discussed how different boundary types can be used in different situations/relationships. Encouraged positive group discussion. Status After Intervention:  Improved    Participation Level:  Active Listener and Interactive    Participation Quality: Appropriate, Attentive, Sharing, and Supportive    Speech:  normal    Thought Process/Content: Logical  Linear    Affective Functioning: Congruent    Mood:  Appropriate    Level of consciousness:  Alert and Attentive    Response to Learning: Able to verbalize current knowledge/experience, Able to verbalize/acknowledge new learning, and Progressing to goal    Endings: None Reported    Modes of Intervention: Education    Discipline Responsible: Psychoeducational Specialist      Signature:  Alejandro Parekh

## 2023-08-06 NOTE — GROUP NOTE
Group Therapy Note    Date: 8/6/2023    Group Start Time: 0200  Group End Time: 0300  Group Topic: Recreational    [facility]    Whit Strong                                                                        Group Therapy Note    Date: 8/6/2023  Start Time: 0200pm  End Time:  0300pm  Number of Participants: 12    Type of Group: Recreational    Name:  Personality Test    Patient's Goal: Identify personality traits. Increased awareness of strengths. Notes: Patients completed self-survey to identify personality traits. Status After Intervention:  Improved    Participation Level:  Active Listener and Interactive    Participation Quality: Appropriate, Attentive, and Sharing    Speech:  normal    Thought Process/Content: Logical  Linear    Affective Functioning: Congruent    Mood:  Appropriate    Level of consciousness:  Alert and Attentive    Response to Learning: Able to verbalize current knowledge/experience, Able to verbalize/acknowledge new learning, and Progressing to goal    Endings: None Reported    Modes of Intervention: Activity    Discipline Responsible: Psychoeducational Specialist      Signature:  JAE Strong

## 2023-08-06 NOTE — BH NOTE
Patient is awake, alert, oriented x4. He has been out on the unit all day. He attended groups and was med compliant. He continues to appear watchful but he does deny paranoia. He states \"I'm just worried about my dad's health right now because he has sleep apnea. \" He is bizarre and he is observed to be awkward when socializing, but he is able to socialize. He reports his anxiety is currently 1/10 which is down from 6/10 when he took Vistaril an hour ago. He reports his depression is 0/10. He denies SI/HI/AVH. Encouraged continued appropriate milieu participation. Will continue to monitor.

## 2023-08-06 NOTE — BH NOTE
Patient is awake, alert, oriented x4. His affect is flat overall and he appears watchful. He reports he is having trouble with another patient on the unit and he is feeling targeted. He did identify the patient and there was no direct threat to the patient, just the patient feeling paranoid that the patient would do something to him. He reports he understands that this patient has not been violent but that patient is worried. Reassured patient hat we will keep watch on the unit activities and that the patient should leave the area where the patient is if he feels uncomfortable. Asked patient to inform staff if the patient threatens him. He reports he slept fair and that he is requiring melatonin to help and that he has Vistaril at home and that it is not helpful. He reports he continues to have issues with processing his brother's death 12 years ago. He reports he lives with his parents and has a good relationship with them. He reports his anxiety is 2/10. Depression is 0/10. He denies SI/HI/AVH. Encouraged continued group participation. Will continue to monitor.

## 2023-08-07 PROCEDURE — 6370000000 HC RX 637 (ALT 250 FOR IP): Performed by: PSYCHIATRY & NEUROLOGY

## 2023-08-07 PROCEDURE — 6370000000 HC RX 637 (ALT 250 FOR IP): Performed by: NURSE PRACTITIONER

## 2023-08-07 PROCEDURE — 1240000000 HC EMOTIONAL WELLNESS R&B

## 2023-08-07 RX ADMIN — HYDROXYZINE PAMOATE 50 MG: 50 CAPSULE ORAL at 23:36

## 2023-08-07 RX ADMIN — ACETAMINOPHEN 650 MG: 325 TABLET ORAL at 18:14

## 2023-08-07 RX ADMIN — BENZTROPINE MESYLATE 0.5 MG: 0.5 TABLET ORAL at 08:49

## 2023-08-07 RX ADMIN — HYDROXYZINE PAMOATE 50 MG: 50 CAPSULE ORAL at 08:49

## 2023-08-07 RX ADMIN — DIVALPROEX SODIUM 500 MG: 500 TABLET, DELAYED RELEASE ORAL at 20:15

## 2023-08-07 RX ADMIN — BENZTROPINE MESYLATE 0.5 MG: 0.5 TABLET ORAL at 20:15

## 2023-08-07 RX ADMIN — HYDROXYZINE PAMOATE 50 MG: 50 CAPSULE ORAL at 17:25

## 2023-08-07 RX ADMIN — ARIPIPRAZOLE 20 MG: 10 TABLET ORAL at 08:49

## 2023-08-07 RX ADMIN — MIRTAZAPINE 15 MG: 15 TABLET, FILM COATED ORAL at 20:15

## 2023-08-07 RX ADMIN — DIVALPROEX SODIUM 500 MG: 500 TABLET, DELAYED RELEASE ORAL at 08:49

## 2023-08-07 RX ADMIN — MELATONIN 3 MG ORAL TABLET 3 MG: 3 TABLET ORAL at 20:15

## 2023-08-07 ASSESSMENT — PAIN DESCRIPTION - ONSET: ONSET: SUDDEN

## 2023-08-07 ASSESSMENT — PAIN SCALES - GENERAL
PAINLEVEL_OUTOF10: 0
PAINLEVEL_OUTOF10: 4

## 2023-08-07 ASSESSMENT — PAIN DESCRIPTION - ORIENTATION: ORIENTATION: RIGHT

## 2023-08-07 ASSESSMENT — PAIN DESCRIPTION - FREQUENCY: FREQUENCY: OTHER (COMMENT)

## 2023-08-07 ASSESSMENT — PAIN DESCRIPTION - LOCATION: LOCATION: HEAD

## 2023-08-07 ASSESSMENT — PAIN DESCRIPTION - DESCRIPTORS: DESCRIPTORS: DISCOMFORT

## 2023-08-07 NOTE — GROUP NOTE
Group Therapy Note    Date: 8/7/2023    Group Start Time: 1447  Group End Time: 6502  Group Topic: Psychoeducation    [facility]    Whit Sim                                                                        Group Therapy Note    Date: 8/7/2023  Start Time: 0866  End Time:  1055  Number of Participants: 13    Type of Group: Psychoeducation    Wellness Binder Information  Module Name:  5 ways to wellbeing    Patient's Goal:  ID five dimensions of well being. ID one or two ways to improve well being. Notes:  CTRS discussed the five dimensions of well being, and ways to improve well being. Patient receptive of handout and also engaged in discussion. Status After Intervention:  Improved    Participation Level:  Active Listener and Interactive    Participation Quality: Appropriate and Attentive      Speech:  normal      Thought Process/Content: Logical      Affective Functioning: Congruent      Mood: depressed      Level of consciousness:  Alert and Attentive      Response to Learning: Able to verbalize current knowledge/experience and Able to verbalize/acknowledge new learning      Endings: None Reported    Modes of Intervention: Education, Exploration, and Problem-solving      Discipline Responsible: Psychoeducational Specialist      Signature:  Whit Sim

## 2023-08-07 NOTE — CARE COORDINATION
Pt approached SW and asked if he would be able to discharge today or tomorrow. PT states that he is feeling better. He reports that he plans to hang out with his friend once discharged, reports that he was supposed to go camping with his friend over the weekend, but was hospitalized. PT denied SI/HI/AVH. Pt states that he plans to return home with his parents and follow with Sharon Salter. SW contacted Sharon Salter to schedule follow up appointments. They will call back with appointment information shortly.

## 2023-08-07 NOTE — CARE COORDINATION
CLAUDE contacted pt father Shirin Rice [phone] (WEI signed) to gain updated collateral. He states that he has talked to pt and he sounds better. He states that he is comfortable with pt discharge when the doctor feels pt is ready and states no concerns at this time.

## 2023-08-07 NOTE — GROUP NOTE
Group Therapy Note    Date: 8/7/2023    Group Start Time: 1100  Group End Time: 1130  Group Topic: Cognitive Skills    [facility]    PACHECO Venegas, [facility]        Group Therapy Note    Attendees: 15       Patient's Goal:  Pt will be able to identify their attachment style and identify ways to create a secure attachment. Notes:  Pt was an active participant in group discussion. Status After Intervention:  Improved    Participation Level: Active Listener and Interactive    Participation Quality: Appropriate, Attentive, Sharing, and Supportive      Speech:  normal      Thought Process/Content: Logical  Linear      Affective Functioning: Congruent      Mood: anxious and depressed      Level of consciousness:  Alert, Oriented x4, and Attentive      Response to Learning: Able to verbalize current knowledge/experience, Able to verbalize/acknowledge new learning, Able to retain information, Capable of insight, and Progressing to goal      Endings: None Reported    Modes of Intervention: Education, Support, Socialization, Exploration, Clarifying, and Problem-solving      Discipline Responsible: /Counselor      Signature:   PACHECO Venegas, [place]

## 2023-08-08 VITALS
HEIGHT: 69 IN | SYSTOLIC BLOOD PRESSURE: 115 MMHG | OXYGEN SATURATION: 96 % | WEIGHT: 176.2 LBS | DIASTOLIC BLOOD PRESSURE: 85 MMHG | HEART RATE: 76 BPM | BODY MASS INDEX: 26.1 KG/M2 | RESPIRATION RATE: 16 BRPM | TEMPERATURE: 96.8 F

## 2023-08-08 LAB
DATE LAST DOSE: NORMAL
TME LAST DOSE: NORMAL H
VALPROATE SERPL-MCNC: 69 UG/ML (ref 50–100)
VANCOMYCIN DOSE: NORMAL MG

## 2023-08-08 PROCEDURE — 36415 COLL VENOUS BLD VENIPUNCTURE: CPT

## 2023-08-08 PROCEDURE — 6370000000 HC RX 637 (ALT 250 FOR IP): Performed by: NURSE PRACTITIONER

## 2023-08-08 PROCEDURE — 80164 ASSAY DIPROPYLACETIC ACD TOT: CPT

## 2023-08-08 RX ORDER — POLYETHYLENE GLYCOL 3350 17 G
2 POWDER IN PACKET (EA) ORAL
Qty: 100 EACH | Refills: 3
Start: 2023-08-08 | End: 2023-09-07

## 2023-08-08 RX ADMIN — ARIPIPRAZOLE 20 MG: 10 TABLET ORAL at 08:50

## 2023-08-08 RX ADMIN — BENZTROPINE MESYLATE 0.5 MG: 0.5 TABLET ORAL at 08:50

## 2023-08-08 RX ADMIN — DIVALPROEX SODIUM 500 MG: 500 TABLET, DELAYED RELEASE ORAL at 08:50

## 2023-08-08 ASSESSMENT — PAIN SCALES - GENERAL
PAINLEVEL_OUTOF10: 0
PAINLEVEL_OUTOF10: 0

## 2023-08-08 NOTE — CARE COORDINATION
SW met with pt to discuss discharge for today. Pt reported that he will be discharging to him home where he lives with his parents and his uncle will be picking him up from the hospital. Pt stated that he is looking forward to spending time with his 1year old dog that is a farzaneh april terrier. Pt reported to feeling good today and that he is ready to go home. Pt is alert and oriented x4. Pt's mood is bright, affect is congruent. Pt's speech is clear, rate and volume is normal. Pt's insight and judgement is improved. Pt denied depression and anxiety. Pt denied SI, HI, AVH. SW called pt's father Nisha Skinner [phone] (WEI signed) to discuss discharge planning. SW spoke with dad who stated that he spoke with the pt today about him coming home. Father denied having any questions or concerns regarding pt's discharge today. Father stated that pt's uncle will be the one picking the pt up from the hospital.     Pt has follow up appointments scheduled at [facility]. Pt has a crisis support appointment on 8/10, 8/14,and 8/28 and medication management on 8/16.     In order to ensure appropriate transition and discharge planning is in place, the following documents have been transmitted to [facility] as the new outpatient provider:    The d/c diagnosis was transmitted to the next care provider  The reason for hospitalization was transmitted to the next care provider  The d/c medications (dosage and indication) were transmitted to the next care provider   The continuing care plan was transmitted to the next care provider

## 2023-08-08 NOTE — GROUP NOTE
Group Therapy Note    Date: 8/8/2023    Group Start Time: 1100  Group End Time: 1260  Group Topic: Psychotherapy    [facility]    PACHECO Angeles LSW        Group Therapy Note    Attendees: 7       Patient's Goal:  To increase socialization and improve interpersonal relationships. Notes:  Pt was an active participant in group discussion. Status After Intervention:  Improved    Participation Level:  Active Listener and Interactive    Participation Quality: Appropriate, Attentive, Sharing, and Supportive      Speech:  normal      Thought Process/Content: Logical      Affective Functioning: Congruent      Mood: anxious      Level of consciousness:  Alert and Oriented x4      Response to Learning: Able to verbalize current knowledge/experience      Endings: None Reported    Modes of Intervention: Support, Socialization, and Exploration      Discipline Responsible: /Counselor      Signature:  PACHECO Liz LSW

## 2023-08-08 NOTE — PLAN OF CARE
Problem: Behavior  Goal: Pt/Family maintain appropriate behavior and adhere to behavioral management agreement, if implemented  Description: INTERVENTIONS:  1. Assess patient/family's coping skills and  non-compliant behavior (including use of illegal substances)  2. Notify security of behavior or suspected illegal substances which indicate the need for search of the family and/or belongings  3. Encourage verbalization of thoughts and concerns in a socially appropriate manner  4. Utilize positive, consistent limit setting strategies supporting safety of patient, staff and others  5. Encourage participation in the decision making process about the behavioral management agreement  6. If a visitor's behavior poses a threat to safety call refer to organization policy. 7. Initiate consult with , Psychosocial CNS, Spiritual Care as appropriate  8/7/2023 2210 by Risa Avina RN  Outcome: Progressing  8/7/2023 1351 by Jenaro Walsh RN  Outcome: Progressing     Problem: Depression/Self Harm  Goal: Effect of psychiatric condition will be minimized and patient will be protected from self harm  Description: INTERVENTIONS:  1. Assess impact of patient's symptoms on level of functioning, self care needs and offer support as indicated  2. Assess patient/family knowledge of depression, impact on illness and need for teaching  3. Provide emotional support, presence and reassurance  4. Assess for possible suicidal thoughts or ideation. If patient expresses suicidal thoughts or statements do not leave alone, initiate Suicide Precautions, move to a room close to the nursing station and obtain sitter  5.  Initiate consults as appropriate with Mental Health Professional, Spiritual Care, Psychosocial CNS, and consider a recommendation to the LIP for a Psychiatric Consultation  8/7/2023 2210 by Risa Avina RN  Outcome: Progressing  8/7/2023 1351 by Jenaro Walsh RN  Outcome: Progressing  Flowsheets (Taken 8/7/2023

## 2023-08-08 NOTE — PLAN OF CARE
[facility]  Discharge Note    Pt discharged with followings belongings:   Dental Appliances: None  Vision - Corrective Lenses: Eyeglasses  Hearing Aid: None  Jewelry: None  Body Piercings Removed: N/A  Clothing: Footwear, Shirt, Shorts, Undergarments, Socks  Other Valuables: Wallet   Valuables sent home with patient, along with safety plan and copy of AVS. All personal belongings were returned to patient. Patient educated on aftercare instructions:  completed, reviewed and signed by pt. .  Information faxed to  [employer] by  . Patient verbalize understanding of AVS:   yes with stated potential to comply to same. Status EXAM upon discharge:  Mental Status and Behavioral Exam  Normal: Yes  Level of Assistance: Independent/Self  Facial Expression: Elevated  Affect: Congruent  Level of Consciousness: Alert  Frequency of Checks: 4 times per hour, close  Mood: Anxious, minimal /  pleasant  Motor Activity:Normal: yes  Eye Contact: Good  Observed Behavior: Cooperative, Friendly  Sexual Misconduct History: Current - no  Preception: [place] to person, [place] to time, [place] to place, [place] to situation  Attention:Normal: Yes  Attention: Others (comment) (improved)  Thought Processes: Other (comment) (more organized)  Thought Content:Normal: yes  Depression Symptoms: No problems reported or observed. Anxiety Symptoms: Generalized  Breana Symptoms: No problems reported or observed. Hallucinations: None  Delusions: No  Memory:Normal: Yes  Memory: Other (comment) (improved)  Insight and Judgment: Yes  Insight and Judgment: Other (comment) (improved)    Tobacco Screening:  Practical Counseling, on admission, graeme X, if applicable and completed (first 3 are required if patient doesn't refuse):             ( X) Recognizing danger situations (included triggers and roadblocks)                    ( X) Coping skills (new ways to manage stress,relaxation techniques, changing routine,

## 2023-08-08 NOTE — DISCHARGE SUMMARY
OPIATESCREENURINE NOT DETECTED 09/27/2022 02:51 PM    PHENCYCLIDINESCREENURINE NOT DETECTED 09/27/2022 02:51 PM    ETOH <10 09/27/2022 02:51 PM     Lab Results   Component Value Date/Time    TSH 0.885 03/30/2021 02:17 PM     Lab Results   Component Value Date    LITHIUM 0.19 (L) 06/30/2014     Lab Results   Component Value Date    VALPROATE 82 08/02/2023       RISK ASSESSMENT AT DISCHARGE: Low risk for suicide and homicide. Treatment Plan:  Reviewed current Medications with the patient. Education provided on the complaince with treatment. Risks, benefits, side effects, drug-to-drug interactions and alternatives to treatment were discussed. Encourage patient to attend outpatient follow up appointment and therapy. Patient was advised to call the outpatient provider, visit the nearest ED or call 911 if symptoms are not manageable. Patient's family member was contacted prior to the discharge.          Medication List        START taking these medications      nicotine polacrilex 2 MG lozenge  Commonly known as: COMMIT  Take 1 lozenge by mouth every 2 hours as needed for Smoking cessation            CHANGE how you take these medications      benztropine 0.5 MG tablet  Commonly known as: COGENTIN  Take 1 tablet by mouth 2 times daily  What changed: when to take this            CONTINUE taking these medications      ARIPiprazole 20 MG tablet  Commonly known as: ABILIFY  Take 1 tablet by mouth daily     Aristada 662 MG/2.4ML Prsy injection  Generic drug: ARIPiprazole lauroxil     divalproex 500 MG DR tablet  Commonly known as: DEPAKOTE  Take 1 tablet by mouth every 12 hours     hydrOXYzine pamoate 50 MG capsule  Commonly known as: VISTARIL     mirtazapine 15 MG tablet  Commonly known as: REMERON  Take 1 tablet by mouth nightly               Where to Get Your Medications        Information about where to get these medications is not yet available    Ask your nurse or doctor about these medications  nicotine

## 2023-09-10 NOTE — PROGRESS NOTES
Chronic  Podiatry, wound care f/u     ordered wound care, US arterial LE- did not show acute findings/ hemodynamic sign stenosis; podiatry eval and stated- No need for nail avulsion in-patient as there are no signs of acute infection and currently on Eliquis, recommended nail trim/debridement in out patient setting in clinic or home visit with NP managing his at home care;  Echo as of 9/5-     Left Ventricle: The left ventricle is normal in size. Mildly increased wall thickness. There is concentric remodeling. Normal wall motion. Septal motion is normal. There is normal systolic function with a visually estimated ejection fraction of 55 - 70%. There is normal diastolic function.    Left Atrium: Left atrium is mildly dilated.    Right Ventricle: Mild right ventricular enlargement. Wall thickness is normal. Right ventricle wall motion  is normal. Systolic function is normal.    Aortic Valve: The aortic valve is a trileaflet valve. There is moderate aortic valve sclerosis.    Tricuspid Valve: There is moderate regurgitation with a centrally directed jet.    Pulmonary Artery: The estimated pulmonary artery systolic pressure is 57 mmHg.               Spiritual Support Group Note    Number of Participants in Group:     4                   Time: 2    Goal: Relief from isolation and loneliness             Luz Maria Sharing             Self-understanding and gain insight              Acceptance and belonging            Recognize they are not alone                Socialization             Empowerment       Encouragement    Topic:  [] Spiritual Wellness and Self Care                  [x] Hope                     [] Connecting with Divine/Others        [] Thankfulness and Gratitude               []  Meaningfulness and Purpose               [] Forgiveness               [x] Peace               [] Connect to [employer]      [] Other    Participation Level:   [x] Active Listener   [] Minimal   [] Monopolizing   [] Interactive   [] No Participation   []  Other:     Attention:   [x] Alert   [] Distractible   [] Drowsy   [] Poor   [] Other:    Manner:   [x] Cooperative   [] Suspicious   [] Withdrawn   [] Guarded   [] Irritable   [] Inhospitable   [] Other:     Others Comments from Group:

## 2024-03-10 ENCOUNTER — HOSPITAL ENCOUNTER (INPATIENT)
Age: 29
LOS: 5 days | Discharge: HOME | End: 2024-03-15
Attending: EMERGENCY MEDICINE | Admitting: PSYCHIATRY & NEUROLOGY
Payer: COMMERCIAL

## 2024-03-10 DIAGNOSIS — Z76.89 ENCOUNTER FOR PSYCHIATRIC ASSESSMENT: Primary | ICD-10-CM

## 2024-03-10 DIAGNOSIS — R45.851 SUICIDAL IDEATION: ICD-10-CM

## 2024-03-10 LAB
ALBUMIN SERPL-MCNC: 4 G/DL (ref 3.5–5.2)
ALP SERPL-CCNC: 65 U/L (ref 40–129)
ALT SERPL-CCNC: 23 U/L (ref 0–40)
AMPHET UR QL SCN: NEGATIVE
ANION GAP SERPL CALCULATED.3IONS-SCNC: 7 MMOL/L (ref 7–16)
APAP SERPL-MCNC: <5 UG/ML (ref 10–30)
AST SERPL-CCNC: 20 U/L (ref 0–39)
BARBITURATES UR QL SCN: NEGATIVE
BASOPHILS # BLD: 0.04 K/UL (ref 0–0.2)
BASOPHILS NFR BLD: 0 % (ref 0–2)
BENZODIAZ UR QL: NEGATIVE
BILIRUB SERPL-MCNC: 0.3 MG/DL (ref 0–1.2)
BILIRUB UR QL STRIP: NEGATIVE
BUN SERPL-MCNC: 6 MG/DL (ref 6–20)
BUPRENORPHINE UR QL: NEGATIVE
CALCIUM SERPL-MCNC: 8.9 MG/DL (ref 8.6–10.2)
CANNABINOIDS UR QL SCN: POSITIVE
CHLORIDE SERPL-SCNC: 102 MMOL/L (ref 98–107)
CHOLEST SERPL-MCNC: 127 MG/DL
CLARITY UR: CLEAR
CO2 SERPL-SCNC: 28 MMOL/L (ref 22–29)
COCAINE UR QL SCN: NEGATIVE
COLOR UR: YELLOW
COMMENT: NORMAL
CREAT SERPL-MCNC: 0.9 MG/DL (ref 0.7–1.2)
DATE LAST DOSE: ABNORMAL
EOSINOPHIL # BLD: 0.08 K/UL (ref 0.05–0.5)
EOSINOPHILS RELATIVE PERCENT: 1 % (ref 0–6)
ERYTHROCYTE [DISTWIDTH] IN BLOOD BY AUTOMATED COUNT: 12.6 % (ref 11.5–15)
ETHANOLAMINE SERPL-MCNC: <10 MG/DL
FENTANYL UR QL: NEGATIVE
GFR SERPL CREATININE-BSD FRML MDRD: >60 ML/MIN/1.73M2
GLUCOSE SERPL-MCNC: 100 MG/DL (ref 74–99)
GLUCOSE UR STRIP-MCNC: NEGATIVE MG/DL
HCT VFR BLD AUTO: 47 % (ref 37–54)
HDLC SERPL-MCNC: 26 MG/DL
HGB BLD-MCNC: 16 G/DL (ref 12.5–16.5)
HGB UR QL STRIP.AUTO: NEGATIVE
IMM GRANULOCYTES # BLD AUTO: <0.03 K/UL (ref 0–0.58)
IMM GRANULOCYTES NFR BLD: 0 % (ref 0–5)
KETONES UR STRIP-MCNC: NEGATIVE MG/DL
LDLC SERPL CALC-MCNC: 78 MG/DL
LEUKOCYTE ESTERASE UR QL STRIP: NEGATIVE
LYMPHOCYTES NFR BLD: 2.01 K/UL (ref 1.5–4)
LYMPHOCYTES RELATIVE PERCENT: 21 % (ref 20–42)
MCH RBC QN AUTO: 30.8 PG (ref 26–35)
MCHC RBC AUTO-ENTMCNC: 34 G/DL (ref 32–34.5)
MCV RBC AUTO: 90.6 FL (ref 80–99.9)
METHADONE UR QL: NEGATIVE
MONOCYTES NFR BLD: 0.38 K/UL (ref 0.1–0.95)
MONOCYTES NFR BLD: 4 % (ref 2–12)
NEUTROPHILS NFR BLD: 74 % (ref 43–80)
NEUTS SEG NFR BLD: 7.09 K/UL (ref 1.8–7.3)
NITRITE UR QL STRIP: NEGATIVE
OPIATES UR QL SCN: NEGATIVE
OXYCODONE UR QL SCN: NEGATIVE
PCP UR QL SCN: NEGATIVE
PH UR STRIP: 6.5 [PH] (ref 5–9)
PLATELET # BLD AUTO: 269 K/UL (ref 130–450)
PMV BLD AUTO: 10.8 FL (ref 7–12)
POTASSIUM SERPL-SCNC: 3.6 MMOL/L (ref 3.5–5)
PROT SERPL-MCNC: 6.7 G/DL (ref 6.4–8.3)
PROT UR STRIP-MCNC: NEGATIVE MG/DL
RBC # BLD AUTO: 5.19 M/UL (ref 3.8–5.8)
SALICYLATES SERPL-MCNC: <0.3 MG/DL (ref 0–30)
SODIUM SERPL-SCNC: 137 MMOL/L (ref 132–146)
SP GR UR STRIP: 1.02 (ref 1–1.03)
TEST INFORMATION: ABNORMAL
TME LAST DOSE: ABNORMAL H
TOXIC TRICYCLIC SC,BLOOD: NEGATIVE
TRIGL SERPL-MCNC: 117 MG/DL
UROBILINOGEN UR STRIP-ACNC: 0.2 EU/DL (ref 0–1)
VALPROATE SERPL-MCNC: <3 UG/ML (ref 50–100)
VANCOMYCIN DOSE: ABNORMAL MG
VLDLC SERPL CALC-MCNC: 23 MG/DL
WBC OTHER # BLD: 9.6 K/UL (ref 4.5–11.5)

## 2024-03-10 PROCEDURE — 1240000000 HC EMOTIONAL WELLNESS R&B

## 2024-03-10 PROCEDURE — 80061 LIPID PANEL: CPT

## 2024-03-10 PROCEDURE — 80307 DRUG TEST PRSMV CHEM ANLYZR: CPT

## 2024-03-10 PROCEDURE — G0480 DRUG TEST DEF 1-7 CLASSES: HCPCS

## 2024-03-10 PROCEDURE — 6370000000 HC RX 637 (ALT 250 FOR IP): Performed by: PSYCHIATRY & NEUROLOGY

## 2024-03-10 PROCEDURE — 81003 URINALYSIS AUTO W/O SCOPE: CPT

## 2024-03-10 PROCEDURE — 80143 DRUG ASSAY ACETAMINOPHEN: CPT

## 2024-03-10 PROCEDURE — 85025 COMPLETE CBC W/AUTO DIFF WBC: CPT

## 2024-03-10 PROCEDURE — 99285 EMERGENCY DEPT VISIT HI MDM: CPT

## 2024-03-10 PROCEDURE — 80053 COMPREHEN METABOLIC PANEL: CPT

## 2024-03-10 PROCEDURE — 93005 ELECTROCARDIOGRAM TRACING: CPT | Performed by: PHYSICIAN ASSISTANT

## 2024-03-10 PROCEDURE — 80164 ASSAY DIPROPYLACETIC ACD TOT: CPT

## 2024-03-10 PROCEDURE — 80179 DRUG ASSAY SALICYLATE: CPT

## 2024-03-10 RX ORDER — HALOPERIDOL 5 MG/ML
5 INJECTION INTRAMUSCULAR EVERY 6 HOURS PRN
Status: DISCONTINUED | OUTPATIENT
Start: 2024-03-10 | End: 2024-03-15 | Stop reason: HOSPADM

## 2024-03-10 RX ORDER — NICOTINE 21 MG/24HR
1 PATCH, TRANSDERMAL 24 HOURS TRANSDERMAL DAILY
Status: DISCONTINUED | OUTPATIENT
Start: 2024-03-11 | End: 2024-03-15 | Stop reason: HOSPADM

## 2024-03-10 RX ORDER — ACETAMINOPHEN 325 MG/1
650 TABLET ORAL EVERY 6 HOURS PRN
Status: DISCONTINUED | OUTPATIENT
Start: 2024-03-10 | End: 2024-03-15 | Stop reason: HOSPADM

## 2024-03-10 RX ORDER — LANOLIN ALCOHOL/MO/W.PET/CERES
3 CREAM (GRAM) TOPICAL NIGHTLY PRN
Status: DISCONTINUED | OUTPATIENT
Start: 2024-03-10 | End: 2024-03-15 | Stop reason: HOSPADM

## 2024-03-10 RX ORDER — MAGNESIUM HYDROXIDE/ALUMINUM HYDROXICE/SIMETHICONE 120; 1200; 1200 MG/30ML; MG/30ML; MG/30ML
30 SUSPENSION ORAL PRN
Status: DISCONTINUED | OUTPATIENT
Start: 2024-03-10 | End: 2024-03-15 | Stop reason: HOSPADM

## 2024-03-10 RX ORDER — HYDROXYZINE PAMOATE 50 MG/1
50 CAPSULE ORAL 3 TIMES DAILY PRN
Status: DISCONTINUED | OUTPATIENT
Start: 2024-03-10 | End: 2024-03-15 | Stop reason: HOSPADM

## 2024-03-10 RX ORDER — HALOPERIDOL 5 MG/1
5 TABLET ORAL EVERY 6 HOURS PRN
Status: DISCONTINUED | OUTPATIENT
Start: 2024-03-10 | End: 2024-03-15 | Stop reason: HOSPADM

## 2024-03-10 RX ADMIN — ACETAMINOPHEN 650 MG: 325 TABLET ORAL at 22:33

## 2024-03-10 RX ADMIN — Medication 3 MG: at 22:33

## 2024-03-10 RX ADMIN — HYDROXYZINE PAMOATE 50 MG: 50 CAPSULE ORAL at 22:33

## 2024-03-10 ASSESSMENT — PAIN DESCRIPTION - ORIENTATION: ORIENTATION: RIGHT

## 2024-03-10 ASSESSMENT — PATIENT HEALTH QUESTIONNAIRE - PHQ9
5. POOR APPETITE OR OVEREATING: NOT AT ALL
8. MOVING OR SPEAKING SO SLOWLY THAT OTHER PEOPLE COULD HAVE NOTICED. OR THE OPPOSITE, BEING SO FIGETY OR RESTLESS THAT YOU HAVE BEEN MOVING AROUND A LOT MORE THAN USUAL: NEARLY EVERY DAY
9. THOUGHTS THAT YOU WOULD BE BETTER OFF DEAD, OR OF HURTING YOURSELF: 2
2. FEELING DOWN, DEPRESSED OR HOPELESS: 2
8. MOVING OR SPEAKING SO SLOWLY THAT OTHER PEOPLE COULD HAVE NOTICED. OR THE OPPOSITE, BEING SO FIGETY OR RESTLESS THAT YOU HAVE BEEN MOVING AROUND A LOT MORE THAN USUAL: 3
SUM OF ALL RESPONSES TO PHQ QUESTIONS 1-9: 21
10. IF YOU CHECKED OFF ANY PROBLEMS, HOW DIFFICULT HAVE THESE PROBLEMS MADE IT FOR YOU TO DO YOUR WORK, TAKE CARE OF THINGS AT HOME, OR GET ALONG WITH OTHER PEOPLE: 1
7. TROUBLE CONCENTRATING ON THINGS, SUCH AS READING THE NEWSPAPER OR WATCHING TELEVISION: NEARLY EVERY DAY
2. FEELING DOWN, DEPRESSED OR HOPELESS: MORE THAN HALF THE DAYS
6. FEELING BAD ABOUT YOURSELF - OR THAT YOU ARE A FAILURE OR HAVE LET YOURSELF OR YOUR FAMILY DOWN: NEARLY EVERY DAY
SUM OF ALL RESPONSES TO PHQ QUESTIONS 1-9: 21
10. IF YOU CHECKED OFF ANY PROBLEMS, HOW DIFFICULT HAVE THESE PROBLEMS MADE IT FOR YOU TO DO YOUR WORK, TAKE CARE OF THINGS AT HOME, OR GET ALONG WITH OTHER PEOPLE: SOMEWHAT DIFFICULT
4. FEELING TIRED OR HAVING LITTLE ENERGY: 2
3. TROUBLE FALLING OR STAYING ASLEEP: 3
9. THOUGHTS THAT YOU WOULD BE BETTER OFF DEAD, OR OF HURTING YOURSELF: MORE THAN HALF THE DAYS
SUM OF ALL RESPONSES TO PHQ9 QUESTIONS 1 & 2: 5
7. TROUBLE CONCENTRATING ON THINGS, SUCH AS READING THE NEWSPAPER OR WATCHING TELEVISION: 3
6. FEELING BAD ABOUT YOURSELF - OR THAT YOU ARE A FAILURE OR HAVE LET YOURSELF OR YOUR FAMILY DOWN: 3
4. FEELING TIRED OR HAVING LITTLE ENERGY: MORE THAN HALF THE DAYS
1. LITTLE INTEREST OR PLEASURE IN DOING THINGS: NEARLY EVERY DAY
SUM OF ALL RESPONSES TO PHQ QUESTIONS 1-9: 19
3. TROUBLE FALLING OR STAYING ASLEEP: NEARLY EVERY DAY
1. LITTLE INTEREST OR PLEASURE IN DOING THINGS: 3
5. POOR APPETITE OR OVEREATING: 0
SUM OF ALL RESPONSES TO PHQ QUESTIONS 1-9: 21

## 2024-03-10 ASSESSMENT — PAIN SCALES - GENERAL
PAINLEVEL_OUTOF10: 10
PAINLEVEL_OUTOF10: 9

## 2024-03-10 ASSESSMENT — LIFESTYLE VARIABLES
HOW OFTEN DO YOU HAVE A DRINK CONTAINING ALCOHOL: NEVER
HOW OFTEN DO YOU HAVE A DRINK CONTAINING ALCOHOL: NEVER
HOW MANY STANDARD DRINKS CONTAINING ALCOHOL DO YOU HAVE ON A TYPICAL DAY: PATIENT DOES NOT DRINK

## 2024-03-10 ASSESSMENT — SLEEP AND FATIGUE QUESTIONNAIRES
AVERAGE NUMBER OF SLEEP HOURS: 8.5
SLEEP PATTERN: DIFFICULTY FALLING ASLEEP;RESTLESSNESS
DO YOU USE A SLEEP AID: NO
DO YOU HAVE DIFFICULTY SLEEPING: YES

## 2024-03-10 ASSESSMENT — PAIN DESCRIPTION - DESCRIPTORS: DESCRIPTORS: NUMBNESS;PRESSURE

## 2024-03-10 ASSESSMENT — PAIN - FUNCTIONAL ASSESSMENT
PAIN_FUNCTIONAL_ASSESSMENT: ACTIVITIES ARE NOT PREVENTED
PAIN_FUNCTIONAL_ASSESSMENT: NONE - DENIES PAIN

## 2024-03-10 ASSESSMENT — PAIN DESCRIPTION - LOCATION: LOCATION: SHOULDER

## 2024-03-10 NOTE — ED NOTES
Notified Dr Carrion of need for admission to [facility] answering machine received, message left will await return call

## 2024-03-10 NOTE — ED PROVIDER NOTES
Shared BEN-ED Attending Visit.  CC: No     Department of Emergency Medicine   ED  Encounter Note  Admit Date/RoomTime: 3/10/2024  2:53 PM  ED Room:     NAME: Jaden Savage III  : 1995  MRN: [id number]     Chief Complaint:  Psychiatric Evaluation (Has hx depression, schizophrenia and has not taken psych meds in 2 weeks, + SI w/ no plan, -HI)    History of Present Illness       Jaden Savage III is a 29 y.o. old male who presents to the emergency department by private vehicle, for depression, anxiety, and paranoia which began 2 week(s) prior to arrival.  He has a prior history of anxiety, depression, and schizophrenia  of which the problem is long-standing. His reported drug use history: Current marijuana.  He is currently in counseling.  There has been no history of recent trauma. He admits to suicidal ideation without a plan and denies homicidal ideation.  He admits to previous suicide attempt by trying to OD.      ROS   Pertinent positives and negatives are stated within HPI, all other systems reviewed and are negative.    Past Medical History:  has a past medical history of ADD (attention deficit disorder with hyperactivity), Anxiety, Cerebral hemorrhage (HCC), Cerebral palsy (HCC), Depression, Dyskinesia, and Schizophrenia (HCC).    Surgical History:  has a past surgical history that includes Foot surgery; hernia repair; and Tonsillectomy.    Social History:  reports that he has been smoking cigarettes. He has a 7.5 pack-year smoking history. He has been exposed to tobacco smoke. He has quit using smokeless tobacco. He reports current drug use. Frequency: 7.00 times per week. Drug: Marijuana (Weed). He reports that he does not drink alcohol.    Family History: family history includes High Blood Pressure in his father; Mental Illness in his brother and mother; Other in his father.     Allergies: Patient has no known allergies.    Physical Exam   Oxygen Saturation Interpretation: Normal.        ED

## 2024-03-10 NOTE — ED NOTES
The pt was accepted to [address] [place] [address] [facility].  Disposition called to Neyda in admitting.

## 2024-03-10 NOTE — ED NOTES
Behavioral Health Crisis Assessment      Chief Complaint: The pt is a 29 yr old white male who reported SI for the last 2-3 days without a plan.  The pt has a hx of several attempts.      Mental Status Exam: The pt presents calm and cooperative with a flat affect and congruent mood.  He is oriented times 4 and is a good historian.  He has good eye contact and hygiene.  He has poor insight and judgement.  His speech is clear and circumstantial.  His thought process is linear. He denied current AVH and HI.  He reported SI.      Legal Status:  [] Voluntary:  [x] Involuntary, Issued by:    Gender:  [x] Male [] Female [] Transgender  [] Other    Sexual Orientation:  [x] Heterosexual [] Homosexual [] Bisexual [] Other    Brief Clinical Summary:  The pt is a 29 year old white male who lives with his parents and is on disability.  He reported that he has been feeling suicidal for the last several days with no specific plan.  The pt stated that he goes to [place] and sees Shirley Rivera for  counseling- seeing a year- seeing Senia for last several years for meds- reported he took himself off his meds 2 weeks ago b/c he felt they were not helping.  He stated that they were changed around last month and initially felt they were working \"and then they stopped\".  He stated that he was Remeron,  Depakote, and another \"new one\".  Pt stated that now that he is off of them he can see that they were probably helping him.  He reported that his last psych admit was last 8/23 due to SI.      The pt reported a hx of auditory hallucinations but denied any for at least over 5 years.  He denied a hx of HI.  He reported a hx of 3 attempts.  The last attempt was \"either 2020 or 2021\".  He stated that he took a whole bottle of Seroquel and woke up the next day and got help.  He also reported an attempt @ age 26 when he OD on Seroquel and went to bed.      The pt denied any specific plan today and stated that the thoughts come and go.  He  years for meds- last admit- [facility] 8/23    [] No    Legal Issues:  []  Yes (Specify)  [x]  No    Access to Weapons:  []  Yes (Specify)  [x]  No    Trauma History:  [x] Reports:  Brother killed himself by hanging himself in the basement- then his gf killed herself by hanging herself 2 years later  [] Denies     Living Situation:  The pt stated that he lives with his parents- there whole life no children    Employment:  \"I have never had a job and have been on disability since @ age 24     Education Level:  Grad     Violence Risk Screening:        Have you ever thought about hurting someone?   [x]  No  []  Yes (Ask the questions listed below)   When?    Did you follow through with the thoughts?      [] No     [] Yes- When and what happened?  2.  Have you ever threatened anyone?  [x]  No  []  Yes (Ask the questions listed below)   When and what happened?    Have you ever threatened someone with a gun, knife or other weapon?   []  No  []  Yes - When and what happened?  2. Have you ever had an order of protection taken out against you? []  Yes [x]  No  3. Have you ever been arrested due to violence? []  Yes [x]  No  4. Have you ever been cruel to animals? []  Yes [x]  No    After consideration of C-SSRS screening results, C-SSRS assessments, and this professional's assessment the patient's overall suicide risk assessed to be:  [] No Risk  [] Low   [x] Moderate   [] High     [x] Discussed current suicide risk, protective and risk factors with RN and ED Physician.    Consulted with ED Physician. Disposition/level of care recommended at this time:  [] Home:   [] Outpatient Provider:   [] Crisis Unit:   [x] Inpatient Psychiatric Unit:  Review for admission to psych  [] Other:

## 2024-03-11 PROCEDURE — 90792 PSYCH DIAG EVAL W/MED SRVCS: CPT | Performed by: NURSE PRACTITIONER

## 2024-03-11 PROCEDURE — 6370000000 HC RX 637 (ALT 250 FOR IP): Performed by: NURSE PRACTITIONER

## 2024-03-11 PROCEDURE — 6370000000 HC RX 637 (ALT 250 FOR IP): Performed by: PSYCHIATRY & NEUROLOGY

## 2024-03-11 PROCEDURE — 1240000000 HC EMOTIONAL WELLNESS R&B

## 2024-03-11 RX ORDER — DIVALPROEX SODIUM 500 MG/1
500 TABLET, DELAYED RELEASE ORAL EVERY 12 HOURS SCHEDULED
Status: DISCONTINUED | OUTPATIENT
Start: 2024-03-12 | End: 2024-03-15 | Stop reason: HOSPADM

## 2024-03-11 RX ORDER — ARIPIPRAZOLE 10 MG/1
10 TABLET ORAL DAILY
Status: DISCONTINUED | OUTPATIENT
Start: 2024-03-11 | End: 2024-03-13

## 2024-03-11 RX ORDER — DIVALPROEX SODIUM 250 MG/1
250 TABLET, DELAYED RELEASE ORAL EVERY 12 HOURS SCHEDULED
Status: COMPLETED | OUTPATIENT
Start: 2024-03-11 | End: 2024-03-12

## 2024-03-11 RX ADMIN — HYDROXYZINE PAMOATE 50 MG: 50 CAPSULE ORAL at 20:42

## 2024-03-11 RX ADMIN — ARIPIPRAZOLE 10 MG: 10 TABLET ORAL at 13:34

## 2024-03-11 RX ADMIN — Medication 3 MG: at 20:42

## 2024-03-11 RX ADMIN — DIVALPROEX SODIUM 250 MG: 250 TABLET, DELAYED RELEASE ORAL at 20:42

## 2024-03-11 ASSESSMENT — PATIENT HEALTH QUESTIONNAIRE - PHQ9
9. THOUGHTS THAT YOU WOULD BE BETTER OFF DEAD, OR OF HURTING YOURSELF: MORE THAN HALF THE DAYS
10. IF YOU CHECKED OFF ANY PROBLEMS, HOW DIFFICULT HAVE THESE PROBLEMS MADE IT FOR YOU TO DO YOUR WORK, TAKE CARE OF THINGS AT HOME, OR GET ALONG WITH OTHER PEOPLE: VERY DIFFICULT
SUM OF ALL RESPONSES TO PHQ9 QUESTIONS 1 & 2: 5
2. FEELING DOWN, DEPRESSED OR HOPELESS: 2
4. FEELING TIRED OR HAVING LITTLE ENERGY: MORE THAN HALF THE DAYS
9. THOUGHTS THAT YOU WOULD BE BETTER OFF DEAD, OR OF HURTING YOURSELF: 2
SUM OF ALL RESPONSES TO PHQ QUESTIONS 1-9: 19
5. POOR APPETITE OR OVEREATING: 1
8. MOVING OR SPEAKING SO SLOWLY THAT OTHER PEOPLE COULD HAVE NOTICED. OR THE OPPOSITE, BEING SO FIGETY OR RESTLESS THAT YOU HAVE BEEN MOVING AROUND A LOT MORE THAN USUAL: SEVERAL DAYS
3. TROUBLE FALLING OR STAYING ASLEEP: 3
6. FEELING BAD ABOUT YOURSELF - OR THAT YOU ARE A FAILURE OR HAVE LET YOURSELF OR YOUR FAMILY DOWN: 3
SUM OF ALL RESPONSES TO PHQ QUESTIONS 1-9: 17
SUM OF ALL RESPONSES TO PHQ QUESTIONS 1-9: 19
3. TROUBLE FALLING OR STAYING ASLEEP: NEARLY EVERY DAY
1. LITTLE INTEREST OR PLEASURE IN DOING THINGS: NEARLY EVERY DAY
6. FEELING BAD ABOUT YOURSELF - OR THAT YOU ARE A FAILURE OR HAVE LET YOURSELF OR YOUR FAMILY DOWN: NEARLY EVERY DAY
SUM OF ALL RESPONSES TO PHQ QUESTIONS 1-9: 19
7. TROUBLE CONCENTRATING ON THINGS, SUCH AS READING THE NEWSPAPER OR WATCHING TELEVISION: MORE THAN HALF THE DAYS
2. FEELING DOWN, DEPRESSED OR HOPELESS: MORE THAN HALF THE DAYS
7. TROUBLE CONCENTRATING ON THINGS, SUCH AS READING THE NEWSPAPER OR WATCHING TELEVISION: 2
5. POOR APPETITE OR OVEREATING: SEVERAL DAYS
4. FEELING TIRED OR HAVING LITTLE ENERGY: 2
1. LITTLE INTEREST OR PLEASURE IN DOING THINGS: 3
8. MOVING OR SPEAKING SO SLOWLY THAT OTHER PEOPLE COULD HAVE NOTICED. OR THE OPPOSITE, BEING SO FIGETY OR RESTLESS THAT YOU HAVE BEEN MOVING AROUND A LOT MORE THAN USUAL: 1
10. IF YOU CHECKED OFF ANY PROBLEMS, HOW DIFFICULT HAVE THESE PROBLEMS MADE IT FOR YOU TO DO YOUR WORK, TAKE CARE OF THINGS AT HOME, OR GET ALONG WITH OTHER PEOPLE: 2

## 2024-03-11 ASSESSMENT — SLEEP AND FATIGUE QUESTIONNAIRES
AVERAGE NUMBER OF SLEEP HOURS: 8
DO YOU USE A SLEEP AID: NO
SLEEP PATTERN: DIFFICULTY FALLING ASLEEP
DO YOU HAVE DIFFICULTY SLEEPING: YES

## 2024-03-11 ASSESSMENT — LIFESTYLE VARIABLES
HOW OFTEN DO YOU HAVE A DRINK CONTAINING ALCOHOL: NEVER
HOW MANY STANDARD DRINKS CONTAINING ALCOHOL DO YOU HAVE ON A TYPICAL DAY: PATIENT DOES NOT DRINK

## 2024-03-11 NOTE — PROGRESS NOTES
[facility]  Day 3 Interdisciplinary Treatment Plan NOTE    Review Date & Time: 3/11/2024 1544    Patient was not in treatment team    Estimated Length of Stay Update:  3-5  Estimated Discharge Date Update: 3/    EDUCATION:   Learner Progress Toward Treatment Goals: Reviewed results and recommendations of this team, Reviewed group plan and strategies, and Reviewed signs, symptoms and risk of self harm and violent behavior    Method: Group    Outcome: Verbalized understanding, Demonstrated Understanding, and Needs reinforcement    PATIENT GOALS: Feel less depressed     PLAN/TREATMENT RECOMMENDATIONS UPDATE:none      GOALS UPDATE:   Time frame for Short-Term Goals: three days         Chandni Ruvalcaba RN

## 2024-03-11 NOTE — PLAN OF CARE
Patient denies SI/HI/Hallucinations. Patient in control of behaviors. Patient rates anxiety and depression 8/10. No active distress noted. Will continue to monitor and will intervene as needed.   Problem: Self Harm/Suicidality  Goal: Will have no self-injury during hospital stay  Description: INTERVENTIONS:  1.  Ensure constant observer at bedside with Q15M safety checks  2.  Maintain a safe environment  3.  Secure patient belongings  4.  Ensure family/visitors adhere to safety recommendations  5.  Ensure safety tray has been added to patient's diet order  6.  Every shift and PRN: Re-assess suicidal risk via Frequent Screener    3/11/2024 1336 by Chandni Ruvalcaba RN  Outcome: Progressing     Problem: Depression  Goal: Will be euthymic at discharge  Description: INTERVENTIONS:  1. Administer medication as ordered  2. Provide emotional support via 1:1 interaction with staff  3. Encourage involvement in milieu/groups/activities  4. Monitor for social isolation  3/11/2024 1336 by Chandni Ruvalcaba RN  Outcome: Progressing     Problem: Breana  Goal: Will exhibit normal sleep and speech and no impulsivity  Description: INTERVENTIONS:  1. Administer medication as ordered  2. Set limits on impulsive behavior  3. Make attempts to decrease external stimuli as possible  3/11/2024 1336 by Chandni Ruvalcaba RN  Outcome: Progressing     Problem: Psychosis  Goal: Will report no hallucinations or delusions  Description: INTERVENTIONS:  1. Administer medication as  ordered  2. Assist with reality testing to support increasing orientation  3. Assess if patient's hallucinations or delusions are encouraging self harm or harm to others and intervene as appropriate  3/11/2024 1336 by Chandni Ruvalcaba RN  Outcome: Progressing

## 2024-03-11 NOTE — H&P
Department of Psychiatry  History and Physical - Adult     CHIEF COMPLAINT:  \" I am very depressed\"    Patient was seen after discussing with the treatment team and reviewing the chart    CIRCUMSTANCES OF ADMISSION:   Jaden Savage is a 29 year old male, with history of bipolar disorder, with past psychiatric hospitalizations, suicide attempts, immediate family history of suicide in brother by hanging,  placing patient at ongoing high risk of suicide, presents to the ER reporting increasing depression, and suicidal thoughts with no specific plan. He was placed on involuntary hold by ER Dr. Triggering factors include pervasive psychiatric illness, and recent medications changes, it is unclear if he is taking the new medications, but states that they stopped working.     HISTORY OF PRESENT ILLNESS:      The patient is a 29 y.o. male with significant past history of white unemployed never  no kids male with significant past history of ADHD, Anxiety, Cerebral hemorrhage, Cerebral Palsy, Bipolar, and Schizophrenia, immediate family history of suicide in brother by hanging, placing patient at ongoing high risk of suicide, presented to the ED voluntarily due to increasing depression and suicidal thoughts for a couple days. He was placed on involuntary hold by ER . Triggering factors include pervasive psychiatric illness, and recent medications changes, it is unclear if he is taking the new medications, but states that they stopped working.  UDS positive for THC, Qtc of 424. Patient was medically cleared and admitted to 7 [facility] adult psychiatry unit for further psychiatric assessment, stabilization, and treatment.       On evaluation today the patient states that he has been more depressed after a recent medication change, he thinks that the medication his OP provider gave was helping him but then decided the medication was not helping him so he stopped taking it.  He has decided that perhaps the medication was  REVIEWED TODAY:  Recent Labs     03/10/24  1522   WBC 9.6   HGB 16.0        Recent Labs     03/10/24  1522      K 3.6      CO2 28   BUN 6   CREATININE 0.9   GLUCOSE 100*     Recent Labs     03/10/24  1522   BILITOT 0.3   ALKPHOS 65   AST 20   ALT 23     Lab Results   Component Value Date/Time    LABAMPH NOT DETECTED 09/27/2022 02:51 PM    LABAMPH NOT DETECTED 06/25/2012 05:48 PM    BARBSCNU NEGATIVE 03/10/2024 03:22 PM    LABBENZ NEGATIVE 03/10/2024 03:22 PM    LABBENZ SEE BELOW 04/28/2014 03:37 PM    CANNAB POSITIVE 06/25/2012 05:48 PM    LABMETH NEGATIVE 03/10/2024 03:22 PM    OPIATESCREENURINE NOT DETECTED 09/27/2022 02:51 PM    PHENCYCLIDINESCREENURINE NOT DETECTED 09/27/2022 02:51 PM    ETOH <10 09/27/2022 02:51 PM     Lab Results   Component Value Date/Time    TSH 0.885 03/30/2021 02:17 PM     Lab Results   Component Value Date    LITHIUM 0.19 (L) 06/30/2014     Lab Results   Component Value Date    VALPROATE <3 (L) 03/10/2024     Lab Results   Component Value Date/Time    LITHIUM 0.19 06/30/2014 11:25 PM    VALPROATE <3 03/10/2024 03:22 PM         Radiology No results found.      TREATMENT PLAN:  The patient's diagnosis, treatment plan, medication management were formulated after patient was seen directly by the attending physician and myself and all relevant documentation was reviewed.    Risk, benefit, side effects, possible outcomes of the medication and alternatives discussed with the patient and the patient demonstrated understanding.  The patient was also educated that the outcome of treatment will depend on the medication compliance as directed by the prescribers along with regular follow-up, compliance with the labs and other work-up, as clinically indicated.    Risk Management: Based on the diagnosis and assessment biopsychosocial treatment model was presented to the patient and was given the opportunity to ask any question.  The patient was agreeable to the plan and all the

## 2024-03-11 NOTE — PROGRESS NOTES
Patient found to have a nickel-sized opened area to lower mid R buttock. Inpatient consult to wound therapy placed and perfectserve message sent to wound therapy. Waiting on response. Will continue to monitor and intervene as needed.

## 2024-03-11 NOTE — CARE COORDINATION
Collateral Contact (WEI signed)  Name: Jaden   Relationship: Dad  Number: [phone]     Collateral Information: SW called pt's dad Jaden [phone] (WEI signed) to obtain collateral information and discuss discharge planning. CLAUDE spoke with Jaden who stated that the pt was not taking his medications for a couple weeks. Dad stated that the pt lives with him and he has no questions or concerns at this time and just hopes that his son gets better. Dad denied access to guns/weapons and stated that the pt's uncle should be able to pick him up from the hospital.              Access to Weapons per Collateral Contact: [] Reports [x] Denies
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
## 2024-03-11 NOTE — PLAN OF CARE
Problem: Self Harm/Suicidality  Goal: Will have no self-injury during hospital stay  Description: INTERVENTIONS:  1.  Ensure constant observer at bedside with Q15M safety checks  2.  Maintain a safe environment  3.  Secure patient belongings  4.  Ensure family/visitors adhere to safety recommendations  5.  Ensure safety tray has been added to patient's diet order  6.  Every shift and PRN: Re-assess suicidal risk via Frequent Screener    Outcome: Progressing     Problem: Depression  Goal: Will be euthymic at discharge  Description: INTERVENTIONS:  1. Administer medication as ordered  2. Provide emotional support via 1:1 interaction with staff  3. Encourage involvement in milieu/groups/activities  4. Monitor for social isolation  Outcome: Progressing     Problem: Breana  Goal: Will exhibit normal sleep and speech and no impulsivity  Description: INTERVENTIONS:  1. Administer medication as ordered  2. Set limits on impulsive behavior  3. Make attempts to decrease external stimuli as possible  Outcome: Progressing     Problem: Psychosis  Goal: Will report no hallucinations or delusions  Description: INTERVENTIONS:  1. Administer medication as  ordered  2. Assist with reality testing to support increasing orientation  3. Assess if patient's hallucinations or delusions are encouraging self harm or harm to others and intervene as appropriate  Outcome: Progressing     Problem: Behavior  Goal: Pt/Family maintain appropriate behavior and adhere to behavioral management agreement, if implemented  Description: INTERVENTIONS:  1. Assess patient/family's coping skills and  non-compliant behavior (including use of illegal substances)  2. Notify security of behavior or suspected illegal substances which indicate the need for search of the family and/or belongings  3. Encourage verbalization of thoughts and concerns in a socially appropriate manner  4. Utilize positive, consistent limit setting strategies supporting safety of  patient, staff and others  5. Encourage participation in the decision making process about the behavioral management agreement  6. If a visitor's behavior poses a threat to safety call refer to organization policy.  7. Initiate consult with , Psychosocial CNS, Spiritual Care as appropriate  Outcome: Progressing     Problem: Anxiety  Goal: Will report anxiety at manageable levels  Description: INTERVENTIONS:  1. Administer medication as ordered  2. Teach and rehearse alternative coping skills  3. Provide emotional support with 1:1 interaction with staff  Outcome: Progressing     Problem: Involuntary Admit  Goal: Will cooperate with staff recommendations and doctor's orders and will demonstrate appropriate behavior  Description: INTERVENTIONS:  1. Treat underlying conditions and offer medication as ordered  2. Educate regarding involuntary admission procedures and rules  3. Contain excessive/inappropriate behavior per unit and hospital policies  Outcome: Progressing     Problem: Risk for Elopement  Goal: Patient will not exit the unit/facility without proper excort  Outcome: Progressing     Problem: Skin/Tissue Integrity  Goal: Absence of new skin breakdown  Description: 1.  Monitor for areas of redness and/or skin breakdown  2.  Assess vascular access sites hourly  3.  Every 4-6 hours minimum:  Change oxygen saturation probe site  4.  Every 4-6 hours:  If on nasal continuous positive airway pressure, respiratory therapy assess nares and determine need for appliance change or resting period.  3/10/2024 2336 by Gracie Ford, RN  Outcome: Progressing  3/10/2024 2336 by Gracie Ford, RN  Outcome: Progressing

## 2024-03-11 NOTE — PROGRESS NOTES
4 Eyes Skin Assessment     NAME:  Jaden Savage III  YOB: 1995  MEDICAL RECORD NUMBER:  [id number]    The patient is being assessed for  Admission    I agree that at least one RN has performed a thorough Head to Toe Skin Assessment on the patient. ALL assessment sites listed below have been assessed.      Areas assessed by both nurses:    Head, Face, Ears, Shoulders, Back, Chest, Arms, Elbows, Hands, Sacrum. Buttock, Coccyx, Ischium, and Legs. Feet and Heels        Does the Patient have a Wound? Yes wound(s) were present on assessment. LDA wound assessment was Initiated and completed by RN Abrasion to posterior L hand. Abrasions to medial back and R flank. Stage 1 skin ulcer to R lower buttock with serosanguinous drainage.        Jamison Prevention initiated by RN: Yes  Wound Care Orders initiated by RN: Yes    Pressure Injury (Stage 3,4, Unstageable, DTI, NWPT, and Complex wounds) if present, place Wound referral order by RN under : Yes    New Ostomies, if present place, Ostomy referral order under : No     Nurse 1 eSignature: Electronically signed by Gracie Ford RN on 3/10/24 at 10:49 PM EDT    **SHARE this note so that the co-signing nurse can place an eSignature**    Nurse 2 eSignature: Electronically signed by Luz Maria Armstrong RN on 3/10/24 at 10:49 PM EDT
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
## 2024-03-12 PROCEDURE — 99232 SBSQ HOSP IP/OBS MODERATE 35: CPT | Performed by: NURSE PRACTITIONER

## 2024-03-12 PROCEDURE — 6370000000 HC RX 637 (ALT 250 FOR IP): Performed by: NURSE PRACTITIONER

## 2024-03-12 PROCEDURE — 6370000000 HC RX 637 (ALT 250 FOR IP): Performed by: PSYCHIATRY & NEUROLOGY

## 2024-03-12 PROCEDURE — 1240000000 HC EMOTIONAL WELLNESS R&B

## 2024-03-12 RX ADMIN — ARIPIPRAZOLE 10 MG: 10 TABLET ORAL at 08:38

## 2024-03-12 RX ADMIN — HYDROXYZINE PAMOATE 50 MG: 50 CAPSULE ORAL at 21:15

## 2024-03-12 RX ADMIN — DIVALPROEX SODIUM 500 MG: 500 TABLET, DELAYED RELEASE ORAL at 21:15

## 2024-03-12 RX ADMIN — DIVALPROEX SODIUM 250 MG: 250 TABLET, DELAYED RELEASE ORAL at 08:38

## 2024-03-12 RX ADMIN — Medication 3 MG: at 21:15

## 2024-03-12 ASSESSMENT — PAIN SCALES - GENERAL: PAINLEVEL_OUTOF10: 0

## 2024-03-12 NOTE — PLAN OF CARE
Problem: Self Harm/Suicidality  Goal: Will have no self-injury during hospital stay  Description: INTERVENTIONS:  1.  Ensure constant observer at bedside with Q15M safety checks  2.  Maintain a safe environment  3.  Secure patient belongings  4.  Ensure family/visitors adhere to safety recommendations  5.  Ensure safety tray has been added to patient's diet order  6.  Every shift and PRN: Re-assess suicidal risk via Frequent Screener  3/11/2024 2114 by Kim Rae RN  Outcome: Progressing     Problem: Depression  Goal: Will be euthymic at discharge  Description: INTERVENTIONS:  1. Administer medication as ordered  2. Provide emotional support via 1:1 interaction with staff  3. Encourage involvement in milieu/groups/activities  4. Monitor for social isolation  3/11/2024 2114 by Kim Rae RN  Outcome: Progressing     Problem: Anxiety  Goal: Will report anxiety at manageable levels  Description: INTERVENTIONS:  1. Administer medication as ordered  2. Teach and rehearse alternative coping skills  3. Provide emotional support with 1:1 interaction with staff  Outcome: Progressing    Patient denies suicidal ideation, homicidal ideations and hallucinations. Patient is out pacing on the unit, not seen socializing with peers. Appears flat, sad, and depressed. Reports both his anxiety and depression as a 7/10. Complaining of poor appetite and increased sleep, stating \"That's what happens when you stop taking your meds\". No unit problems reported. Medications taken without issue. Will continue to observe and support.

## 2024-03-12 NOTE — CARE COORDINATION
Pt approached SW stating that he has no been eating and he has no appetite and he is unsure if this is a result of the medication change and he just wanted to let someone know. SW informed RN.

## 2024-03-12 NOTE — PROGRESS NOTES
Spiritual Support Group Note    Number of Participants in Group:    9                    Time:  3 pm    Goal: Relief from isolation and loneliness             Luz Maria Sharing             Self-understanding and gain insight              Acceptance and belonging            Recognize they are not alone                Socialization             Empowerment       Encouragement    Topic:  [] Spiritual Wellness and Self Care                  [] Hope                     [] Connecting with Divine/Others        [x] Thankfulness and Gratitude               [x]  Meaningfulness and Purpose               [] Forgiveness               [] Peace               [] Connect to Community      [x] Other    Participation Level:   [x] Active Listener   [] Minimal   [] Monopolizing   [] Interactive   [] No Participation   []  Other:     Attention:   [x] Alert   [] Distractible   [] Drowsy   [] Poor   [] Other:    Manner:   [x] Cooperative   [] Suspicious   [] Withdrawn   [] Guarded   [] Irritable   [] Inhospitable   [] Other:     Others Comments from Group:

## 2024-03-12 NOTE — PLAN OF CARE
Denies SI/HI  denies hallucinations  however appears internally stimulated at times and preoccupied   mood is anxious  out on the unit but stays to self  cooperative with meds  attending groups  will continue to monitor

## 2024-03-13 LAB
EKG ATRIAL RATE: 69 BPM
EKG P AXIS: 54 DEGREES
EKG P-R INTERVAL: 170 MS
EKG Q-T INTERVAL: 396 MS
EKG QRS DURATION: 92 MS
EKG QTC CALCULATION (BAZETT): 424 MS
EKG R AXIS: 90 DEGREES
EKG T AXIS: 54 DEGREES
EKG VENTRICULAR RATE: 69 BPM

## 2024-03-13 PROCEDURE — 6370000000 HC RX 637 (ALT 250 FOR IP): Performed by: NURSE PRACTITIONER

## 2024-03-13 PROCEDURE — 6370000000 HC RX 637 (ALT 250 FOR IP): Performed by: PSYCHIATRY & NEUROLOGY

## 2024-03-13 PROCEDURE — 93010 ELECTROCARDIOGRAM REPORT: CPT | Performed by: INTERNAL MEDICINE

## 2024-03-13 PROCEDURE — 1240000000 HC EMOTIONAL WELLNESS R&B

## 2024-03-13 PROCEDURE — 99232 SBSQ HOSP IP/OBS MODERATE 35: CPT | Performed by: NURSE PRACTITIONER

## 2024-03-13 RX ORDER — ARIPIPRAZOLE 15 MG/1
15 TABLET ORAL DAILY
Status: DISCONTINUED | OUTPATIENT
Start: 2024-03-14 | End: 2024-03-15 | Stop reason: HOSPADM

## 2024-03-13 RX ADMIN — DIVALPROEX SODIUM 500 MG: 500 TABLET, DELAYED RELEASE ORAL at 08:20

## 2024-03-13 RX ADMIN — ARIPIPRAZOLE 10 MG: 10 TABLET ORAL at 08:20

## 2024-03-13 RX ADMIN — DIVALPROEX SODIUM 500 MG: 500 TABLET, DELAYED RELEASE ORAL at 21:36

## 2024-03-13 RX ADMIN — ACETAMINOPHEN 650 MG: 325 TABLET ORAL at 11:44

## 2024-03-13 RX ADMIN — HYDROXYZINE PAMOATE 50 MG: 50 CAPSULE ORAL at 18:46

## 2024-03-13 RX ADMIN — ACETAMINOPHEN 650 MG: 325 TABLET ORAL at 21:36

## 2024-03-13 RX ADMIN — Medication 3 MG: at 21:36

## 2024-03-13 ASSESSMENT — PAIN DESCRIPTION - DESCRIPTORS
DESCRIPTORS: ACHING
DESCRIPTORS: ACHING
DESCRIPTORS: ACHING;TIGHTNESS

## 2024-03-13 ASSESSMENT — PAIN SCALES - GENERAL
PAINLEVEL_OUTOF10: 6
PAINLEVEL_OUTOF10: 0
PAINLEVEL_OUTOF10: 0

## 2024-03-13 ASSESSMENT — PAIN - FUNCTIONAL ASSESSMENT: PAIN_FUNCTIONAL_ASSESSMENT: ACTIVITIES ARE NOT PREVENTED

## 2024-03-13 ASSESSMENT — PAIN DESCRIPTION - ONSET: ONSET: ON-GOING

## 2024-03-13 ASSESSMENT — PAIN DESCRIPTION - LOCATION
LOCATION: HEAD

## 2024-03-13 ASSESSMENT — PAIN DESCRIPTION - ORIENTATION: ORIENTATION: RIGHT

## 2024-03-13 ASSESSMENT — PAIN DESCRIPTION - PAIN TYPE: TYPE: ACUTE PAIN

## 2024-03-13 ASSESSMENT — PAIN DESCRIPTION - FREQUENCY: FREQUENCY: INTERMITTENT

## 2024-03-13 NOTE — PROGRESS NOTES
BEHAVIORAL HEALTH FOLLOW-UP NOTE     3/13/2024     Patient was seen and examined in person, Chart reviewed   Patient's case discussed with staff/team    Chief Complaint: \" I feel a little better\"    Interim History:     Jaden is out on the unit this morning he is guarded, less anxious.  He has been in the day room watching television with peers.  He did eat his breakfast this morning in the milieu with peers.  He is reporting improvement in his depression. States sleep is okay.  Remains preoccupied with latency in his responses.  Thought content is impoverished. he denies any auditory or visual hallucinations, he does endorse paranoia, but states this is improving.  He is taking his medications.  He has been in the milieu as tolerated.  Insight judgment limited.  Insight is improving, as he states \"I need to stay on my medications\"     appetite:   [] Normal/Unchanged  [] Increased  [x] Decreased      Sleep:       [] Normal/Unchanged  [x] Fair       [] Poor              Energy:    [] Normal/Unchanged  [] Increased  [x] Decreased        SI [] Present  [x] Absent    HI  []Present  [x] Absent     Aggression:  [] yes  [x] no    Patient is [x] able  [] unable to CONTRACT FOR SAFETY     PAST MEDICAL/PSYCHIATRIC HISTORY:   Past Medical History:   Diagnosis Date    ADD (attention deficit disorder with hyperactivity)     Anxiety     Cerebral hemorrhage (HCC)     Cerebral palsy (HCC)     Depression     Dyskinesia     Schizophrenia (HCC)        FAMILY/SOCIAL HISTORY:  Family History   Problem Relation Age of Onset    Mental Illness Mother     High Blood Pressure Father     Other Father     Mental Illness Brother      Social History     Socioeconomic History    Marital status: Single     Spouse name: Not on file    Number of children: 0    Years of education: 13    Highest education level: High school graduate   Occupational History    Occupation: DISABLED     Employer: UNEMPLOYED   Tobacco Use    Smoking status: Every Day      work  CD evaluation  Encourage patient to attend group and other milieu activities.  Discharge planning discussed with the patient and treatment team.    PSYCHOTHERAPY/COUNSELING:  [x] Therapeutic interview  [x] Supportive  [] CBT  [] Ongoing  [] Other    [x] Patient continues to need, on a daily basis, active treatment furnished directly by or requiring the supervision of inpatient psychiatric personnel      Anticipated Length of stay: 5 - 7 days based on stability     NOTE: This report was transcribed using voice recognition software. Every effort was made to ensure accuracy; however, inadvertent computerized transcription errors may be present.          Electronically signed by ELAINE Briones CNP on 3/13/2024 at 10:11 AM

## 2024-03-13 NOTE — PROGRESS NOTES
[facility] [place]  Day 3 Interdisciplinary Treatment Plan NOTE    Review Date & Time: 03/13/2024 0900    Patient was not in treatment team    Estimated Length of Stay Update:  3-5  Estimated Discharge Date Update: 3-    EDUCATION:   Learner Progress Toward Treatment Goals: Reviewed results and recommendations of this team, Reviewed group plan and strategies, Reviewed signs, symptoms and risk of self harm and violent behavior, and Reviewed goals and plan of care    Method: Individual    Outcome: Verbalized understanding and Demonstrated Understanding    PATIENT GOALS: Keeping taking my goals    PLAN/TREATMENT RECOMMENDATIONS UPDATE:03/17/2024    GOALS UPDATE:   Time frame for Short-Term Goals: 2 days      Chandni Ruvalcaba RN

## 2024-03-13 NOTE — PROGRESS NOTES
No changes to previous assessment. Patient denies SI/HI/Hallucinations. Will continue to monitor and will intervene as needed.

## 2024-03-13 NOTE — CARE COORDINATION
Peer Recovery Support Note    Name: Jaden Savage III  Date: 3/13/2024    Chief Complaint   Patient presents with    Psychiatric Evaluation     Has hx depression, schizophrenia and has not taken psych meds in 2 weeks, + SI w/ no plan, -HI       Peer Support met with patient.  [x] Support and education provided  [] Resources provided   [x] Treatment referral: [facility]  [x] Other: Left Peer contact information  [] Patient declined peer recovery services     Referred By:     Notes: Patient requested to speak to me after Peer Recovery group on unit. Patient would like help with stopping the use of marijuana. States he has been using it everyday since he was 13 and he feelsl like it is leading him to want to do other drugs as well. Peer commended patient for being open and honest and explained that as one of the most important things in the recovery process. Peer called  Verónica. (Gracie) and patient is scheduled for initial intake assessment for [facility] on Wednesday, March 20th at 10am. Patient given directions as well as Peer contact information.    Signed: Chandni Leal, 3/13/2024    [phone]

## 2024-03-13 NOTE — BH NOTE
Patient denies suicidal ideation, homicidal ideations and hallucinations. Presents friendly and cooperative during assessment. Patient is out pacing on the unit but remains isolative to himself. Rates anxiety a 5/10 but denies any depression or pain. Medications taken without issue. No complaints or concerns verbalized at this time. No unit problems reported. Will continue to observe and support.

## 2024-03-13 NOTE — PLAN OF CARE
Problem: Self Harm/Suicidality  Goal: Will have no self-injury during hospital stay  Description: INTERVENTIONS:  1.  Ensure constant observer at bedside with Q15M safety checks  2.  Maintain a safe environment  3.  Secure patient belongings  4.  Ensure family/visitors adhere to safety recommendations  5.  Ensure safety tray has been added to patient's diet order  6.  Every shift and PRN: Re-assess suicidal risk via Frequent Screener  Outcome: Progressing     Problem: Depression  Goal: Will be euthymic at discharge  Description: INTERVENTIONS:  1. Administer medication as ordered  2. Provide emotional support via 1:1 interaction with staff  3. Encourage involvement in milieu/groups/activities  4. Monitor for social isolation  Outcome: Progressing     Problem: Anxiety  Goal: Will report anxiety at manageable levels  Description: INTERVENTIONS:  1. Administer medication as ordered  2. Teach and rehearse alternative coping skills  3. Provide emotional support with 1:1 interaction with staff  Outcome: Progressing     Problem: Involuntary Admit  Goal: Will cooperate with staff recommendations and doctor's orders and will demonstrate appropriate behavior  Description: INTERVENTIONS:  1. Treat underlying conditions and offer medication as ordered  2. Educate regarding involuntary admission procedures and rules  3. Contain excessive/inappropriate behavior per unit and hospital policies  Outcome: Progressing

## 2024-03-13 NOTE — PLAN OF CARE
Patient denies SI/HI/Hallucinations. Patient rates anxiety 3 and depression a 3. Patient ate breakfast and seen in [facility] with peers. Will continue to monitor and will intervene as needed.       Problem: Self Harm/Suicidality  Goal: Will have no self-injury during hospital stay  Description: INTERVENTIONS:  1.  Ensure constant observer at bedside with Q15M safety checks  2.  Maintain a safe environment  3.  Secure patient belongings  4.  Ensure family/visitors adhere to safety recommendations  5.  Ensure safety tray has been added to patient's diet order  6.  Every shift and PRN: Re-assess suicidal risk via Frequent Screener    3/13/2024 0811 by Chandni Ruvalcaba, RN  Outcome: Progressing     Problem: Depression  Goal: Will be euthymic at discharge  Description: INTERVENTIONS:  1. Administer medication as ordered  2. Provide emotional support via 1:1 interaction with staff  3. Encourage involvement in milieu/groups/activities  4. Monitor for social isolation  3/13/2024 0811 by Chandni Ruvalcaba, RN  Outcome: Progressing     Problem: Breana  Goal: Will exhibit normal sleep and speech and no impulsivity  Description: INTERVENTIONS:  1. Administer medication as ordered  2. Set limits on impulsive behavior  3. Make attempts to decrease external stimuli as possible  Outcome: Progressing     Problem: Psychosis  Goal: Will report no hallucinations or delusions  Description: INTERVENTIONS:  1. Administer medication as  ordered  2. Assist with reality testing to support increasing orientation  3. Assess if patient's hallucinations or delusions are encouraging self harm or harm to others and intervene as appropriate  Outcome: Progressing

## 2024-03-13 NOTE — PROGRESS NOTES
BEHAVIORAL HEALTH FOLLOW-UP NOTE     3/13/2024     Patient was seen and examined in person, Chart reviewed   Patient's case discussed with staff/team    Chief Complaint: \" I feel paranoid\"    Interim History:     Jaden is out on the unit this morning he is guarded suspicious.  He is also anxious and depressed.  He states that he is not eating well.  States sleep is okay.  He appears very preoccupied with latency in his response.  He denies any auditory or visual hallucinations, he does endorse paranoia, he appears paranoid.  He is taking his medications.  He has been in the milieu as tolerated.  Insight judgment limited.  Patient is very high risk of suicide as there is severe mental illness and first-degree relative with completed suicide.  Appetite:   [] Normal/Unchanged  [] Increased  [x] Decreased      Sleep:       [] Normal/Unchanged  [x] Fair       [] Poor              Energy:    [] Normal/Unchanged  [] Increased  [x] Decreased        SI [] Present  [x] Absent    HI  []Present  [x] Absent     Aggression:  [] yes  [x] no    Patient is [x] able  [] unable to CONTRACT FOR SAFETY     PAST MEDICAL/PSYCHIATRIC HISTORY:   Past Medical History:   Diagnosis Date    ADD (attention deficit disorder with hyperactivity)     Anxiety     Cerebral hemorrhage (HCC)     Cerebral palsy (HCC)     Depression     Dyskinesia     Schizophrenia (HCC)        FAMILY/SOCIAL HISTORY:  Family History   Problem Relation Age of Onset    Mental Illness Mother     High Blood Pressure Father     Other Father     Mental Illness Brother      Social History     Socioeconomic History    Marital status: Single     Spouse name: Not on file    Number of children: 0    Years of education: 13    Highest education level: High school graduate   Occupational History    Occupation: DISABLED     Employer: UNEMPLOYED   Tobacco Use    Smoking status: Every Day     Current packs/day: 0.50     Average packs/day: 0.5 packs/day for 30.8 years (15.4 ttl pk-yrs)

## 2024-03-14 PROCEDURE — 6370000000 HC RX 637 (ALT 250 FOR IP): Performed by: PSYCHIATRY & NEUROLOGY

## 2024-03-14 PROCEDURE — 99232 SBSQ HOSP IP/OBS MODERATE 35: CPT | Performed by: NURSE PRACTITIONER

## 2024-03-14 PROCEDURE — 6370000000 HC RX 637 (ALT 250 FOR IP): Performed by: NURSE PRACTITIONER

## 2024-03-14 PROCEDURE — 1240000000 HC EMOTIONAL WELLNESS R&B

## 2024-03-14 RX ADMIN — HYDROXYZINE PAMOATE 50 MG: 50 CAPSULE ORAL at 19:20

## 2024-03-14 RX ADMIN — Medication 3 MG: at 20:58

## 2024-03-14 RX ADMIN — HALOPERIDOL 5 MG: 5 TABLET ORAL at 23:59

## 2024-03-14 RX ADMIN — ARIPIPRAZOLE 15 MG: 15 TABLET ORAL at 08:36

## 2024-03-14 RX ADMIN — ACETAMINOPHEN 650 MG: 325 TABLET ORAL at 20:58

## 2024-03-14 RX ADMIN — DIVALPROEX SODIUM 500 MG: 500 TABLET, DELAYED RELEASE ORAL at 20:58

## 2024-03-14 RX ADMIN — DIVALPROEX SODIUM 500 MG: 500 TABLET, DELAYED RELEASE ORAL at 08:36

## 2024-03-14 ASSESSMENT — PAIN SCALES - GENERAL
PAINLEVEL_OUTOF10: 0
PAINLEVEL_OUTOF10: 7
PAINLEVEL_OUTOF10: 7

## 2024-03-14 ASSESSMENT — PAIN DESCRIPTION - FREQUENCY: FREQUENCY: INTERMITTENT

## 2024-03-14 ASSESSMENT — PAIN DESCRIPTION - PAIN TYPE: TYPE: ACUTE PAIN

## 2024-03-14 ASSESSMENT — PAIN DESCRIPTION - ORIENTATION: ORIENTATION: RIGHT

## 2024-03-14 ASSESSMENT — PAIN DESCRIPTION - LOCATION
LOCATION: HEAD
LOCATION: SHOULDER

## 2024-03-14 ASSESSMENT — PAIN - FUNCTIONAL ASSESSMENT: PAIN_FUNCTIONAL_ASSESSMENT: ACTIVITIES ARE NOT PREVENTED

## 2024-03-14 ASSESSMENT — PAIN DESCRIPTION - DESCRIPTORS
DESCRIPTORS: ACHING
DESCRIPTORS: ACHING

## 2024-03-14 ASSESSMENT — PAIN DESCRIPTION - ONSET: ONSET: GRADUAL

## 2024-03-14 NOTE — PLAN OF CARE
Patient denies SI/HI/AVH. Denies anxiety and depression. Patient appears flat but brightens on approach. Denies racing thoughts and paranoia. Patient is discharge focused. Reports sleep and appetite are good. Patient is taking medications, attending groups, and eating meals. Remains in control of behaviors. Will continue to monitor and intervene as needed.       Problem: Self Harm/Suicidality  Goal: Will have no self-injury during hospital stay  Description: INTERVENTIONS:  1.  Ensure constant observer at bedside with Q15M safety checks  2.  Maintain a safe environment  3.  Secure patient belongings  4.  Ensure family/visitors adhere to safety recommendations  5.  Ensure safety tray has been added to patient's diet order  6.  Every shift and PRN: Re-assess suicidal risk via Frequent Screener    Outcome: Progressing     Problem: Depression  Goal: Will be euthymic at discharge  Description: INTERVENTIONS:  1. Administer medication as ordered  2. Provide emotional support via 1:1 interaction with staff  3. Encourage involvement in milieu/groups/activities  4. Monitor for social isolation  Outcome: Progressing     Problem: Breana  Goal: Will exhibit normal sleep and speech and no impulsivity  Description: INTERVENTIONS:  1. Administer medication as ordered  2. Set limits on impulsive behavior  3. Make attempts to decrease external stimuli as possible  Outcome: Progressing     Problem: Psychosis  Goal: Will report no hallucinations or delusions  Description: INTERVENTIONS:  1. Administer medication as  ordered  2. Assist with reality testing to support increasing orientation  3. Assess if patient's hallucinations or delusions are encouraging self harm or harm to others and intervene as appropriate  Outcome: Progressing     Problem: Behavior  Goal: Pt/Family maintain appropriate behavior and adhere to behavioral management agreement, if implemented  Description: INTERVENTIONS:  1. Assess patient/family's coping skills and

## 2024-03-14 NOTE — FLOWSHEET NOTE
Inpatient Wound Care (Initial consult) 7301    Admit Date: 3/10/2024  2:53 PM    Reason for consult:  Right buttock    Significant history:  Per H&P    Jaden Savage is a 29 year old male, with history of bipolar disorder, with past psychiatric hospitalizations, suicide attempts, immediate family history of suicide in brother by hanging,  placing patient at ongoing high risk of suicide, presents to the ER reporting increasing depression, and suicidal thoughts with no specific plan. He was placed on involuntary hold by ER Dr. Triggering factors include pervasive psychiatric illness, and recent medications changes, it is unclear if he is taking the new medications, but states that they stopped working.        Findings:     03/14/24 0949   Skin Integumentary    Skin Integrity   (dry crusted areas)   Location right buttock         Impression:  Skin assessment complete: see above documentation    Plan: Barrier cream  Patient will need continued preventative care.      Lizz Parmar RN 3/14/2024 9:50 AM

## 2024-03-14 NOTE — PLAN OF CARE
Problem: Self Harm/Suicidality  Goal: Will have no self-injury during hospital stay  Description: INTERVENTIONS:  1.  Ensure constant observer at bedside with Q15M safety checks  2.  Maintain a safe environment  3.  Secure patient belongings  4.  Ensure family/visitors adhere to safety recommendations  5.  Ensure safety tray has been added to patient's diet order  6.  Every shift and PRN: Re-assess suicidal risk via Frequent Screener    3/14/2024 1256 by Araseli Loyd RN  Outcome: Progressing     Problem: Depression  Goal: Will be euthymic at discharge  Description: INTERVENTIONS:  1. Administer medication as ordered  2. Provide emotional support via 1:1 interaction with staff  3. Encourage involvement in milieu/groups/activities  4. Monitor for social isolation  3/14/2024 1256 by Araseli Loyd RN  Outcome: Progressing     Problem: Behavior  Goal: Pt/Family maintain appropriate behavior and adhere to behavioral management agreement, if implemented  Description: INTERVENTIONS:  1. Assess patient/family's coping skills and  non-compliant behavior (including use of illegal substances)  2. Notify security of behavior or suspected illegal substances which indicate the need for search of the family and/or belongings  3. Encourage verbalization of thoughts and concerns in a socially appropriate manner  4. Utilize positive, consistent limit setting strategies supporting safety of patient, staff and others  5. Encourage participation in the decision making process about the behavioral management agreement  6. If a visitor's behavior poses a threat to safety call refer to organization policy.  7. Initiate consult with , Psychosocial CNS, Spiritual Care as appropriate  3/14/2024 1256 by Araseli Loyd RN  Outcome: Progressing     Problem: Anxiety  Goal: Will report anxiety at manageable levels  Description: INTERVENTIONS:  1. Administer medication as ordered  2. Teach and rehearse alternative  coping skills  3. Provide emotional support with 1:1 interaction with staff  3/14/2024 1256 by Araseli Loyd RN  Outcome: Progressing     Problem: Risk for Elopement  Goal: Patient will not exit the unit/facility without proper excort  3/14/2024 1256 by Araseli Loyd, RN  Outcome: Progressing     Patient denies suicidal ideations, homicidal ideations, and hallucinations. Affect flat, blunt. He denies feelings of anxiety and depression to this nurse. Social with select peers. He is medication compliant, attending groups, and is in control of his behavior.

## 2024-03-14 NOTE — PROGRESS NOTES
BEHAVIORAL HEALTH FOLLOW-UP NOTE     3/14/2024     Patient was seen and examined in person, Chart reviewed   Patient's case discussed with staff/team    Chief Complaint: \" I am okay, I am worried about having a ride home\"    Interim History:     Jaden is out on the unit this morning he remains blunt somewhat anxious.  He is concerned about having a ride home on discharge.  He has been in the day room watching television with peers.  He did eat his breakfast this morning in the milieu with peers.  He is denying depression.  States sleep is okay.  Thought content is impoverished. He denies any auditory or visual hallucinations, no paranoia.  He is taking his medications.  He has been in the milieu as tolerated.  Insight judgment improving.  Insight is improving,     appetite:   [] Normal/Unchanged  [] Increased  [x] Decreased      Sleep:       [] Normal/Unchanged  [x] Fair       [] Poor              Energy:    [] Normal/Unchanged  [] Increased  [x] Decreased        SI [] Present  [x] Absent    HI  []Present  [x] Absent     Aggression:  [] yes  [x] no    Patient is [x] able  [] unable to CONTRACT FOR SAFETY     PAST MEDICAL/PSYCHIATRIC HISTORY:   Past Medical History:   Diagnosis Date    ADD (attention deficit disorder with hyperactivity)     Anxiety     Cerebral hemorrhage (HCC)     Cerebral palsy (HCC)     Depression     Dyskinesia     Schizophrenia (HCC)        FAMILY/SOCIAL HISTORY:  Family History   Problem Relation Age of Onset    Mental Illness Mother     High Blood Pressure Father     Other Father     Mental Illness Brother      Social History     Socioeconomic History    Marital status: Single     Spouse name: Not on file    Number of children: 0    Years of education: 13    Highest education level: High school graduate   Occupational History    Occupation: DISABLED     Employer: UNEMPLOYED   Tobacco Use    Smoking status: Every Day     Current packs/day: 0.50     Average packs/day: 0.5 packs/day for 30.8

## 2024-03-14 NOTE — CARE COORDINATION
[facility] contacted [place] [id number]  to schedule appointment for pt. They will call back shortly with appointment information.

## 2024-03-14 NOTE — DISCHARGE INSTRUCTIONS
Follow up for Tobacco Cessation at:    [facility]                                 Date:  Friday 3/22 at 10am              [address]    [place] [address]   (Inside [facility]    take B elevators to 7th floor)   Phone: [phone]   Fax: [phone]

## 2024-03-14 NOTE — CARE COORDINATION
SW received call from [facility]  and pt has crisis support appointment 3/19 at 12pm with tatiana and medication management appointment 3/28 at 8am with kacey.

## 2024-03-15 VITALS
BODY MASS INDEX: 25.93 KG/M2 | HEIGHT: 68 IN | RESPIRATION RATE: 18 BRPM | WEIGHT: 171.1 LBS | HEART RATE: 76 BPM | TEMPERATURE: 98.2 F | DIASTOLIC BLOOD PRESSURE: 61 MMHG | SYSTOLIC BLOOD PRESSURE: 103 MMHG | OXYGEN SATURATION: 96 %

## 2024-03-15 LAB
DATE LAST DOSE: NORMAL
TME LAST DOSE: NORMAL H
VALPROATE SERPL-MCNC: 56 UG/ML (ref 50–100)
VANCOMYCIN DOSE: NORMAL MG

## 2024-03-15 PROCEDURE — 36415 COLL VENOUS BLD VENIPUNCTURE: CPT

## 2024-03-15 PROCEDURE — 99239 HOSP IP/OBS DSCHRG MGMT >30: CPT | Performed by: NURSE PRACTITIONER

## 2024-03-15 PROCEDURE — 6370000000 HC RX 637 (ALT 250 FOR IP): Performed by: NURSE PRACTITIONER

## 2024-03-15 PROCEDURE — 80164 ASSAY DIPROPYLACETIC ACD TOT: CPT

## 2024-03-15 RX ORDER — ARIPIPRAZOLE 15 MG/1
15 TABLET ORAL DAILY
Qty: 30 TABLET | Refills: 0 | Status: SHIPPED | OUTPATIENT
Start: 2024-03-16 | End: 2024-04-15

## 2024-03-15 RX ORDER — DIVALPROEX SODIUM 500 MG/1
500 TABLET, DELAYED RELEASE ORAL EVERY 12 HOURS SCHEDULED
Qty: 60 TABLET | Refills: 0 | Status: SHIPPED | OUTPATIENT
Start: 2024-03-15 | End: 2024-04-14

## 2024-03-15 RX ADMIN — DIVALPROEX SODIUM 500 MG: 500 TABLET, DELAYED RELEASE ORAL at 08:14

## 2024-03-15 RX ADMIN — ARIPIPRAZOLE 15 MG: 15 TABLET ORAL at 08:15

## 2024-03-15 ASSESSMENT — PAIN SCALES - GENERAL: PAINLEVEL_OUTOF10: 0

## 2024-03-15 NOTE — CARE COORDINATION
CLAUDE was informed by RN that pt's medications were sent to [facility] but pt has the needed medications at home and tomorrow his Abilify will be delivered to him and he took his dose for today.     CLAUDE called [email or web address] transportation [phone] to schedule pt's ride home. CLAUDE spoke with [employer] and scheduled the transportation. CLAUDE stated that they will need to go to the main entrance on [facility] and call the RN station upon arrival. CLAUDE stated that the ETA is 2 hours. Confirmation # [id number]

## 2024-03-15 NOTE — GROUP NOTE
Group Therapy Note    Date: 3/14/2024    Group Start Time: 1100  Group End Time: 1120  Group Topic: Community Meeting    [facility]    Lety Hernandez CTRS    Group Therapy Note    Attendees: 9    Date: 3/14/2024  Start Time: 1100  End Time:  1120  Number of Participants: 9    Type of Group: Community Meeting    Was updated on expectations of the unit, staffing, and programming.  Patient shared goal for today as \"Keep taking my meds.\"    Status After Intervention:  Improved    Participation Level: Active Listener and Interactive    Participation Quality: Appropriate, Attentive, Sharing, and Supportive      Speech:  normal      Thought Process/Content: Logical  Linear      Affective Functioning: Congruent      Mood:  Appropriate      Level of consciousness:  Alert and Attentive      Response to Learning: Able to verbalize current knowledge/experience, Able to verbalize/acknowledge new learning, Able to retain information, Capable of insight, Able to change behavior, and Progressing to goal      Endings: None Reported    Modes of Intervention: Education, Support, Socialization, Exploration, Clarifying, and Problem-solving      Discipline Responsible: Psychoeducational Specialist      Signature:  JAE Jones    
                                                                         Group Therapy Note    Date: 3/15/2024    Group Start Time: 1030  Group End Time: 1050  Group Topic: Community Meeting    [facility]    Lety Hernandez CTRS    Group Therapy Note    Attendees: 13    Date: 3/15/2024  Start Time: 1030  End Time:  1045  Number of Participants: 13    Type of Group: Community Meeting    Was updated on expectations of the unit, staffing, and programming.  Patient shared goal for today as \"Stay on the right path when I leave.\"    Status After Intervention:  Improved    Participation Level: Active Listener and Interactive    Participation Quality: Appropriate, Attentive, Sharing, and Supportive      Speech:  normal      Thought Process/Content: Logical  Linear      Affective Functioning: Congruent      Mood:  Appropriate      Level of consciousness:  Alert and Attentive      Response to Learning: Able to verbalize current knowledge/experience, Able to verbalize/acknowledge new learning, Able to retain information, Capable of insight, Able to change behavior, and Progressing to goal      Endings: None Reported    Modes of Intervention: Education, Support, Socialization, Exploration, Clarifying, and Problem-solving      Discipline Responsible: Psychoeducational Specialist      Signature:  JAE Jones    
                                                                      Group Therapy Note    Date: 3/11/2024    Group Start Time: 1015  Group End Time: 1110  Group Topic: Psychoeducation    [facility]    Lisa Turpin, CTRS    Date: 3/11/2024  Name:  what you need to know about coping     Patient's Goal:  pt will be able to id daily and life events currently experiencing. Will be asked to commit to 1 coping skill for the day.     Notes:  Pleasant and engaged in group, sharing on own without prompts. Accepting of handout.     Status After Intervention:  Improved    Participation Level: Active Listener and Interactive    Participation Quality: Appropriate, Attentive, and Sharing      Speech:  normal      Thought Process/Content: Logical      Affective Functioning: Congruent      Mood: euthymic      Level of consciousness:  Alert, Oriented x4, and Attentive      Response to Learning: Able to verbalize/acknowledge new learning, Able to retain information, and Progressing to goal      Endings: None Reported    Modes of Intervention: Education, Support, Socialization, and Clarifying      Discipline Responsible: Psychoeducational Specialist      Signature:  Lisa Turpin CTRS         
                                                                      Group Therapy Note    Date: 3/11/2024    Group Start Time: 1400  Group End Time: 1435  Group Topic: Cognitive Skills    [facility]    Fredy Martinez MSW        Group Therapy Note    Attendees: 9       Patient's Goal:  To participate in group discussion on Different Styles of Boundaries and Healthy Healthy Boundaries    Notes:  Patient was an active participant in group discussion.    Status After Intervention:  Improved    Participation Level: Active Listener and Interactive    Participation Quality: Appropriate, Attentive, Sharing, and Supportive      Speech:  normal      Thought Process/Content: Logical      Affective Functioning: Congruent      Mood: anxious      Level of consciousness:  Alert, Oriented x4, and Attentive      Response to Learning: Able to verbalize current knowledge/experience, Able to verbalize/acknowledge new learning, and Able to retain information      Endings: None Reported    Modes of Intervention: Education, Support, Socialization, Exploration, Clarifying, and Problem-solving      Discipline Responsible: /Counselor      Signature:  PACHECO Carbajal    
                                                                      Group Therapy Note    Date: 3/12/2024    Group Start Time: 0905  Group End Time: 1000  Group Topic: Community Meeting    [facility]    Benito Reina MSW, LSW        Group Therapy Note    Attendees: 9       Patient's Goal:  Pt attended community meeting where we discussed unit rules and expectations.      Notes:  Pt was an active participant in group therapy.  They identified their daily goal as, \"talk kinder to myself.\"    Status After Intervention:  Unchanged    Participation Level: Active Listener and Interactive    Participation Quality: Appropriate, Attentive, and Sharing      Speech:  normal      Thought Process/Content: Logical      Affective Functioning: Congruent      Mood: depressed      Level of consciousness:  Alert and Attentive      Response to Learning: Able to verbalize current knowledge/experience      Endings: None Reported    Modes of Intervention: Education and Support      Discipline Responsible: /Counselor      Signature:  PACHECO Pitts, SHIKHA    
                                                                      Group Therapy Note    Date: 3/12/2024    Group Start Time: 0915  Group End Time: 1000  Group Topic: Cognitive Skills    [facility]    Benito Reina, SHIKHA BERGMAN        Group Therapy Note    Attendees: 9       Patient's Goal:  Pt attended group therapy where we discussed cognitive distortions.      Notes:  Pt was an active participant in group therapy.    Status After Intervention:  Improved    Participation Level: Active Listener and Interactive    Participation Quality: Appropriate, Attentive, Sharing, and Supportive      Speech:  normal      Thought Process/Content: Logical      Affective Functioning: Congruent      Mood: depressed      Level of consciousness:  Alert, Oriented x4, and Attentive      Response to Learning: Able to verbalize current knowledge/experience, Able to verbalize/acknowledge new learning, Able to retain information, and Capable of insight      Endings: None Reported    Modes of Intervention: Education, Support, Socialization, Exploration, Clarifying, and Problem-solving      Discipline Responsible: /Counselor      Signature:  PACHECO Pitts, SHIKHA    
                                                                      Group Therapy Note    Date: 3/12/2024    Group Start Time: 1420  Group End Time: 1500  Group Topic: Cognitive Skills    [facility]    Carmen Miranda MSW, SHIKHA        Group Therapy Note    Attendees: 6       Patient's Goal:  pt will be able to identify coping skills and how to incorporate them when feeling anxious.     Notes:  pt participated in group and made connections.     Status After Intervention:  Improved    Participation Level: Active Listener and Interactive    Participation Quality: Appropriate, Attentive, Sharing, and Supportive      Speech:  normal      Thought Process/Content: Logical  Linear      Affective Functioning: Congruent      Mood: anxious      Level of consciousness:  Alert, Oriented x4, and Attentive      Response to Learning: Able to verbalize current knowledge/experience, Able to verbalize/acknowledge new learning, Able to retain information, and Capable of insight      Endings: None Reported    Modes of Intervention: Education, Support, Socialization, Exploration, Clarifying, and Problem-solving      Discipline Responsible: /Counselor      Signature:  PACHECO Davis LSW  
                                                                      Group Therapy Note    Date: 3/13/2024    Group Start Time: 1100  Group End Time: 1140  Group Topic: Psychoeducation    [facility]    Lisa Turpin CTRS    Module Name:  Making me a priority.     Patient's Goal:  Patient will be able to id steps in making themselves a priority and putting themselves first.     Notes:  Pleasant and sharing when prompted. Appeared to be an active listener. Willing to accept handout.     Status After Intervention:  Improved    Participation Level: Active Listener and Interactive    Participation Quality: Appropriate, Attentive, and Sharing      Speech:  normal      Thought Process/Content: Logical      Affective Functioning: Congruent      Mood: euthymic      Level of consciousness:  Alert, Oriented x4, and Attentive      Response to Learning: Able to verbalize/acknowledge new learning, Able to retain information, and Progressing to goal      Endings: None Reported    Modes of Intervention: Education, Support, Socialization, and Clarifying      Discipline Responsible: Psychoeducational Specialist      Signature:  JAE Gutiérrez         
                                                                      Group Therapy Note    Date: 3/13/2024    Group Start Time: 1410  Group End Time: 1450  Group Topic: Cognitive Skills    [facility]    Carmen Miranda MSW, LSW        Group Therapy Note    Attendees: 7       Patient's Goal:  Pt will be able to identify their current values, how their values have changed over their lives and how their values impact their lives today.     Notes:  pt participated in group and made connections.     Status After Intervention:  Improved    Participation Level: Active Listener and Interactive    Participation Quality: Appropriate, Attentive, Sharing, and Supportive      Speech:  normal      Thought Process/Content: Logical  Linear      Affective Functioning: Congruent      Mood: anxious      Level of consciousness:  Alert, Oriented x4, and Attentive      Response to Learning: Able to verbalize current knowledge/experience, Able to verbalize/acknowledge new learning, Able to retain information, and Capable of insight      Endings: None Reported    Modes of Intervention: Education, Support, Socialization, Exploration, Clarifying, and Problem-solving      Discipline Responsible: /Counselor      Signature:  PACHECO Davis LSW  
                                                                      Group Therapy Note    Date: 3/14/2024    Group Start Time: 1015  Group End Time: 1100  Group Topic: Cognitive Skills    [facility]    Benito Reina MSW, LSW        Group Therapy Note    Attendees: 9         Patient's Goal:  Pt attended group therapy where we discussed \"Fair fighting Rule,\" and how to communicate effectively when emotionally involved.     Notes:  Pt was an active participant in group therapy.    Status After Intervention:  Improved    Participation Level: Active Listener and Interactive    Participation Quality: Appropriate, Attentive, and Sharing      Speech:  normal      Thought Process/Content: Logical      Affective Functioning: Congruent      Mood: euthymic      Level of consciousness:  Alert, Oriented x4, and Attentive      Response to Learning: Able to verbalize current knowledge/experience, Able to verbalize/acknowledge new learning, and Able to retain information      Endings: None Reported    Modes of Intervention: Education, Support, Socialization, Exploration, Clarifying, and Problem-solving      Discipline Responsible: /Counselor      Signature:  PACHECO Pitts, SHIKHA    
                                                                      Group Therapy Note    Date: 3/14/2024    Group Start Time: 1430  Group End Time: 1510  Group Topic: Psychoeducation    [facility]    Lety Hernandez CTRS    Group Therapy Note    Attendees: 11    Date: 3/14/2024  Start Time: 1430  End Time:  1510  Number of Participants: 11    Type of Group: Psychoeducation    Name:  Positive Thinking    Patient's Goal:  ID what is positive thinking, types of negative thinking and how to improve positive thinking habits.    Notes:  CTRS lead educational group discussion on positive thinking. Encouraged patients to share their experiences. Patient was actively engaged in discussion.    Status After Intervention:  Improved    Participation Level: Active Listener and Interactive    Participation Quality: Appropriate, Attentive, Sharing, and Supportive      Speech:  normal      Thought Process/Content: Logical  Linear      Affective Functioning: Congruent      Mood:  Appropriate      Level of consciousness:  Alert and Attentive      Response to Learning: Able to verbalize current knowledge/experience, Able to verbalize/acknowledge new learning, Able to retain information, Capable of insight, Able to change behavior, and Progressing to goal      Endings: None Reported    Modes of Intervention: Education, Support, Socialization, Exploration, Clarifying, and Problem-solving      Discipline Responsible: Psychoeducational Specialist      Signature:  JAE Jones    
                                                                      Group Therapy Note    Date: 3/15/2024    Group Start Time: 1050  Group End Time: 1120  Group Topic: Psychoeducation    [facility]    Lety Hernandez CTRS    Group Therapy Note    Attendees: 13    Date: 3/15/2024  Start Time: 1050  End Time:  1120  Number of Participants: 13    Type of Group: Psychoeducation    Name:  Healthy Relationships    Patient's Goal:  ID characteristics of negative relationships, characteristics of healthy relationships, how relationships affect mental health, ways to improve/maintain healthy relationships.    Notes:  CTRS lead educational discussion on healthy relationships. Encouraged patients to share their experiences. Patient was actively engaged in group discussion.    Status After Intervention:  Improved    Participation Level: Active Listener and Interactive    Participation Quality: Appropriate, Attentive, Sharing, and Supportive      Speech:  normal      Thought Process/Content: Logical  Linear      Affective Functioning: Congruent      Mood:  Appropriate      Level of consciousness:  Alert and Attentive      Response to Learning: Able to verbalize current knowledge/experience, Able to verbalize/acknowledge new learning, Able to retain information, Capable of insight, Able to change behavior, and Progressing to goal      Endings: None Reported    Modes of Intervention: Education, Support, Socialization, Exploration, Clarifying, and Problem-solving      Discipline Responsible: Psychoeducational Specialist      Signature:  JAE Jones    
Declined to set goal for the day.                                                                      Group Therapy Note    Date: 3/11/2024    Group Start Time: 0900  Group End Time: 0920  Group Topic: Community Meeting    [facility]    Lisa Turpin CTRS        Group Therapy Note    Type of Group: Community Meeting    Patient's Goal:  Patient will be able to id daily staffing assignments, rules of the floor, and what is expected of them as patients.     Notes:  patient appeared to be an active listener, willing to share goal for the day.     Status After Intervention:  Improved    Participation Level: Active Listener and Interactive    Participation Quality: Appropriate, Attentive, and Sharing      Speech:  normal      Thought Process/Content: Logical      Affective Functioning: Congruent      Mood: euthymic      Level of consciousness:  Alert, Oriented x4, and Attentive      Response to Learning: Able to verbalize/acknowledge new learning, Able to retain information, and Progressing to goal      Endings: None Reported    Modes of Intervention: Support, Socialization, and Clarifying      Discipline Responsible: Psychoeducational Specialist      Signature:  JAE Gutiérrez       
Shared goal for the day as to keep taking my meds.                                                                       Group Therapy Note    Date: 3/13/2024    Group Start Time: 1045  Group End Time: 1100  Group Topic: Community Meeting    [facility]  1    Lisa Turpin, SUSANS        Type of Group: Community Meeting    Patient's Goal:  Patient will be able to id staffing assignments, patients roles, and expectations. Be able to share goal for the day.     Notes:  Pleasant and an active listener in group. Able to share goal for the day.     Status After Intervention:  Improved    Participation Level: Active Listener and Interactive    Participation Quality: Appropriate, Attentive, and Sharing      Speech:  normal      Thought Process/Content: Logical      Affective Functioning: Congruent      Mood: euthymic      Level of consciousness:  Alert, Oriented x4, and Attentive      Response to Learning: Able to verbalize/acknowledge new learning, Able to retain information, and Progressing to goal      Endings: None Reported    Modes of Intervention: Support, Socialization, and Clarifying      Discipline Responsible: Psychoeducational Specialist      Signature:  JAE Gutiérrez         
insulin therapy

## 2024-03-15 NOTE — PROGRESS NOTES
Patient requesting a taxi for transportation home. He states that his father will pay for it when they arrive at the house. [employer] called and states that ETA is 230pm.

## 2024-03-15 NOTE — BH NOTE
Patient approached nurses station requesting medication for anxiety. Patient encouraged to try and rest. Patient unable to relax and became increasingly more anxious, pacing the unit. PRN Haldol administered per patient request. Patient continues to pace, talking to staff at nurses station. Will continue to monitor and intervene as needed.

## 2024-03-15 NOTE — PLAN OF CARE
Patient denies SI/HI/AVH. Reports a little anxiety, PRN Vistaril administered per patient request. Denies depression. Reports medications are helping, recognizes need to take medications consistently. Appears flat on approach but brightens on assessment/conversation. Patient is out on the unit and social with select peers. Taking medications, eating meals, and attending group. Will continue to monitor and intervene as needed.       Problem: Self Harm/Suicidality  Goal: Will have no self-injury during hospital stay  Description: INTERVENTIONS:  1.  Ensure constant observer at bedside with Q15M safety checks  2.  Maintain a safe environment  3.  Secure patient belongings  4.  Ensure family/visitors adhere to safety recommendations  5.  Ensure safety tray has been added to patient's diet order  6.  Every shift and PRN: Re-assess suicidal risk via Frequent Screener    3/15/2024 0057 by Gracie Ford RN  Outcome: Progressing  3/14/2024 1256 by Araseli Loyd RN  Outcome: Progressing     Problem: Depression  Goal: Will be euthymic at discharge  Description: INTERVENTIONS:  1. Administer medication as ordered  2. Provide emotional support via 1:1 interaction with staff  3. Encourage involvement in milieu/groups/activities  4. Monitor for social isolation  3/15/2024 0057 by Gracie Ford RN  Outcome: Progressing  3/14/2024 1256 by Araseli Loyd RN  Outcome: Progressing     Problem: Breana  Goal: Will exhibit normal sleep and speech and no impulsivity  Description: INTERVENTIONS:  1. Administer medication as ordered  2. Set limits on impulsive behavior  3. Make attempts to decrease external stimuli as possible  Outcome: Progressing     Problem: Psychosis  Goal: Will report no hallucinations or delusions  Description: INTERVENTIONS:  1. Administer medication as  ordered  2. Assist with reality testing to support increasing orientation  3. Assess if patient's hallucinations or delusions are encouraging self harm or

## 2024-03-15 NOTE — PLAN OF CARE
Problem: Self Harm/Suicidality  Goal: Will have no self-injury during hospital stay  Description: INTERVENTIONS:  1.  Ensure constant observer at bedside with Q15M safety checks  2.  Maintain a safe environment  3.  Secure patient belongings  4.  Ensure family/visitors adhere to safety recommendations  5.  Ensure safety tray has been added to patient's diet order  6.  Every shift and PRN: Re-assess suicidal risk via Frequent Screener    3/15/2024 0820 by Araseli Loyd RN  Outcome: Progressing     Problem: Depression  Goal: Will be euthymic at discharge  Description: INTERVENTIONS:  1. Administer medication as ordered  2. Provide emotional support via 1:1 interaction with staff  3. Encourage involvement in milieu/groups/activities  4. Monitor for social isolation  3/15/2024 0820 by Araseli Loyd RN  Outcome: Progressing     Problem: Behavior  Goal: Pt/Family maintain appropriate behavior and adhere to behavioral management agreement, if implemented  Description: INTERVENTIONS:  1. Assess patient/family's coping skills and  non-compliant behavior (including use of illegal substances)  2. Notify security of behavior or suspected illegal substances which indicate the need for search of the family and/or belongings  3. Encourage verbalization of thoughts and concerns in a socially appropriate manner  4. Utilize positive, consistent limit setting strategies supporting safety of patient, staff and others  5. Encourage participation in the decision making process about the behavioral management agreement  6. If a visitor's behavior poses a threat to safety call refer to organization policy.  7. Initiate consult with , Psychosocial CNS, Spiritual Care as appropriate  3/15/2024 0820 by Araseli Loyd RN  Outcome: Progressing     Problem: Anxiety  Goal: Will report anxiety at manageable levels  Description: INTERVENTIONS:  1. Administer medication as ordered  2. Teach and rehearse alternative  coping skills  3. Provide emotional support with 1:1 interaction with staff  3/15/2024 0820 by Araseli Loyd, RN  Outcome: Progressing     Problem: Risk for Elopement  Goal: Patient will not exit the unit/facility without proper excort  3/15/2024 0820 by Araseli Loyd, RN  Outcome: Progressing     Problem: Skin/Tissue Integrity  Goal: Absence of new skin breakdown  Description: 1.  Monitor for areas of redness and/or skin breakdown  2.  Assess vascular access sites hourly  3.  Every 4-6 hours minimum:  Change oxygen saturation probe site  4.  Every 4-6 hours:  If on nasal continuous positive airway pressure, respiratory therapy assess nares and determine need for appliance change or resting period.  3/15/2024 0820 by Araseli Loyd RN  Outcome: Progressing     Patient denies suicidal ideations, homicidal ideations, and hallucinations. Affect brightens with conversation. He reports anxiety but states that it is more like excitement to be discharged today. Patient verbalizes the importance on compliance with his medication regimen when he gets home. He is medication compliant and is in control of his behavior.

## 2024-03-15 NOTE — TRANSITION OF CARE
Behavioral Health Transition Record to Provider    Patient Name: Jaden Savage III  YOB: 1995   Medical Record Number: [id number]  Date of Admission: 3/10/2024  2:53 PM   Date of Discharge: 03/15/2024    Attending Provider: Trinity Carrion MD   Discharging Provider:  FELY Carrion  To contact this individual call [phone] and ask the  to page.  If unavailable, ask to be transferred to Behavioral Health Provider on call.  A Behavioral Health Provider will be available on call 24/7 and during holidays.    Primary Care Provider: Myles Chakraborty DO    No Known Allergies    Reason for Admission: Jaden Savage is a 29 year old male, with history of bipolar disorder, with past psychiatric hospitalizations, suicide attempts, immediate family history of suicide in brother by hanging,  placing patient at ongoing high risk of suicide, presents to the ER reporting increasing depression, and suicidal thoughts with no specific plan. He was placed on involuntary hold by ER Dr. Triggering factors include pervasive psychiatric illness, and recent medications changes, it is unclear if he is taking the new medications, but states that they stopped working.     Admission Diagnosis: Suicidal ideation [R45.851]  Bipolar 1 disorder, depressed (HCC) [F31.9]  Encounter for psychiatric assessment [Z76.89]    * No surgery found *    Results for orders placed or performed during the hospital encounter of 03/10/24   Comprehensive Metabolic Panel   Result Value Ref Range    Sodium 137 132 - 146 mmol/L    Potassium 3.6 3.5 - 5.0 mmol/L    Chloride 102 98 - 107 mmol/L    CO2 28 22 - 29 mmol/L    Anion Gap 7 7 - 16 mmol/L    Glucose 100 (H) 74 - 99 mg/dL    BUN 6 6 - 20 mg/dL    Creatinine 0.9 0.70 - 1.20 mg/dL    Est, Glom Filt Rate >60 >60 mL/min/1.73m2    Calcium 8.9 8.6 - 10.2 mg/dL    Total Protein 6.7 6.4 - 8.3 g/dL    Albumin 4.0 3.5 - 5.2 g/dL    Total Bilirubin 0.3 0.0 - 1.2 mg/dL    Alkaline Phosphatase 65 40 -

## 2024-03-15 NOTE — DISCHARGE SUMMARY
DISCHARGE SUMMARY      Patient ID:  Jaden Savage III  [id number]  29 y.o.  1995    Admit date: 3/10/2024    Discharge date and time: 3/15/2024    Admitting Physician: Trinity Carrion MD     Discharge Physician: Dr Sheyla SEALS    Discharge Diagnoses:   Patient Active Problem List   Diagnosis    Mood disorder (HCC)    Cannabis use disorder, severe, dependence (HCC)    Tobacco use disorder, continuous    Severe recurrent major depression with psychotic features (HCC)    Depression    Gender dysphoria in adult    Suicidal ideation    Depression, major, single episode    Bipolar 1 disorder, manic, moderate (HCC)    Bipolar 1 disorder, depressed (HCC)       Admission Condition: poor    Discharged Condition: stable    Admission Circumstance:   Jaden Savage is a 29 year old male, with history of bipolar disorder, with past psychiatric hospitalizations, suicide attempts, immediate family history of suicide in brother by hanging,  placing patient at ongoing high risk of suicide, presents to the ER reporting increasing depression, and suicidal thoughts with no specific plan. He was placed on involuntary hold by ER Dr. Triggering factors include pervasive psychiatric illness, and recent medications changes, it is unclear if he is taking the new medications, but states that they stopped working       PAST MEDICAL/PSYCHIATRIC HISTORY:   Past Medical History:   Diagnosis Date    ADD (attention deficit disorder with hyperactivity)     Anxiety     Cerebral hemorrhage (HCC)     Cerebral palsy (HCC)     Depression     Dyskinesia     Schizophrenia (HCC)        FAMILY/SOCIAL HISTORY:  Family History   Problem Relation Age of Onset    Mental Illness Mother     High Blood Pressure Father     Other Father     Mental Illness Brother      Social History     Socioeconomic History    Marital status: Single     Spouse name: Not on file    Number of children: 0    Years of education: 13    Highest education level: High school graduate  [phone]  [address] [facility] [address]      Phone: [phone]   ARIPiprazole 15 MG tablet  divalproex 500 MG DR tablet     NOTE: This report was transcribed using voice recognition software. Every effort was made to ensure accuracy; however, inadvertent computerized transcription errors may be present.      TIME SPEND - 35 MINUTES TO COMPLETE THE EVALUATION, DISCHARGE SUMMARY, MEDICATION RECONCILIATION AND FOLLOW UP CARE     Signed:  ELAINE Briones CNP  3/15/2024  9:17 AM

## 2024-03-15 NOTE — PROGRESS NOTES
Spoke with [facility] representative for updated ETA for patient's ride. She states that the originally scheduled ride arrived at 1239 and left after waiting 10 minutes. No call was received. New ride ETA is 316pm

## 2024-03-15 NOTE — PROGRESS NOTES
[facility] [place]  Discharge Note    Pt discharged with followings belongings:   Dental Appliances: None  Vision - Corrective Lenses: Eyeglasses  Hearing Aid: None  Jewelry: None  Body Piercings Removed: N/A  Clothing: Footwear, Jacket/Coat, Pants, Shirt, Socks  Other Valuables: Wallet   Valuables  returned to patient. Patient educated on aftercare instructions: yes  Information faxed to n/a by n/a  at 12:14 PM .Patient verbalize understanding of AVS:  yes.    Status EXAM upon discharge:  Mental Status and Behavioral Exam  Normal: No  Level of Assistance: Independent/Self  Facial Expression: Flat (brightens with conversation)  Affect: Congruent  Level of Consciousness: Alert  Frequency of Checks: 4 times per hour, close  Mood:Normal: No  Mood: Anxious  Motor Activity:Normal: Yes  Motor Activity: Increased  Eye Contact: Good  Observed Behavior: Cooperative, Friendly  Sexual Misconduct History: Current - no  Preception: [place] to person, [place] to time, [place] to place, [place] to situation  Attention:Normal: Yes  Attention: Distractible  Thought Processes: Unremarkable  Thought Content:Normal: Yes  Thought Content: Preoccupations  Depression Symptoms: No problems reported or observed.  Anxiety Symptoms: Generalized  Breana Symptoms: No problems reported or observed.  Hallucinations: None  Delusions: No  Memory:Normal: Yes  Memory: Other (comment)  Insight and Judgment: No  Insight and Judgment: Poor insight    Tobacco Screening:  Practical Counseling, on admission, graeme X, if applicable and completed (first 3 are required if patient doesn't refuse):            ( ) Recognizing danger situations (included triggers and roadblocks)                    ( ) Coping skills (new ways to manage stress,relaxation techniques, changing routine, distraction)                                                           ( ) Basic information about quitting (benefits of quitting, techniques in how to quit, available resources  (

## 2024-03-15 NOTE — CARE COORDINATION
CLAUDE met with pt to discuss discharge for today. Pt is alert and oriented x4. Pt's mood is neutral, affect is congruent. Pt's speech is clear, rate and volume is normal. Pt's insight and judgement is improved. Pt denied depression and anxiety. Pt denied SI, HI, AVH. Pt reported that he will be discharging to his home with his parents and his insurance will be picking him up from the hospital. Pt stated that he is looking forward to being home with family. Pt reported to feeling good today. Pt stated that they are going to get their medications filled at [facility] pharmacy and if there should be no copay on his mediations.     SW called pt's dad Jaden [phone] (WEI signed)  to discuss discharge planning. SW spoke with dad who stated that he has spoke with the pt but the pt does not know his medications. Dad stated that he can not baby sit the pt and he needs to take his medications on his own and his medications are packaged. Dad stated that the uncle is unable to pick the pt up and he will need an insurance ride to get home today. CLAUDE confirmed address in facesheet for discharge location.      Pt has follow up appointments scheduled at [facility]. Pt has a counseling appointment on 3/19 at 12 PM and medication management appointment on 3/28 at 8 AM.    In order to ensure appropriate transition and discharge planning is in place, the following documents have been transmitted to [facility], as the new outpatient provider:    The d/c diagnosis was transmitted to the next care provider  The reason for hospitalization was transmitted to the next care provider  The d/c medications (dosage and indication) were transmitted to the next care provider   The continuing care plan was transmitted to the next care provider

## 2024-07-21 ENCOUNTER — HOSPITAL ENCOUNTER (INPATIENT)
Age: 29
LOS: 8 days | Discharge: HOME | End: 2024-07-30
Attending: EMERGENCY MEDICINE | Admitting: PSYCHIATRY & NEUROLOGY
Payer: COMMERCIAL

## 2024-07-21 DIAGNOSIS — F39 MOOD DISORDER (HCC): Primary | ICD-10-CM

## 2024-07-21 LAB
ALBUMIN SERPL-MCNC: 4.3 G/DL (ref 3.5–5.2)
ALP SERPL-CCNC: 57 U/L (ref 40–129)
ALT SERPL-CCNC: 26 U/L (ref 0–40)
AMPHET UR QL SCN: NEGATIVE
ANION GAP SERPL CALCULATED.3IONS-SCNC: 9 MMOL/L (ref 7–16)
APAP SERPL-MCNC: <5 UG/ML (ref 10–30)
AST SERPL-CCNC: 23 U/L (ref 0–39)
BARBITURATES UR QL SCN: NEGATIVE
BASOPHILS # BLD: 0.05 K/UL (ref 0–0.2)
BASOPHILS NFR BLD: 1 % (ref 0–2)
BENZODIAZ UR QL: NEGATIVE
BILIRUB SERPL-MCNC: 0.5 MG/DL (ref 0–1.2)
BUN SERPL-MCNC: 9 MG/DL (ref 6–20)
BUPRENORPHINE UR QL: NEGATIVE
CALCIUM SERPL-MCNC: 9 MG/DL (ref 8.6–10.2)
CANNABINOIDS UR QL SCN: POSITIVE
CHLORIDE SERPL-SCNC: 101 MMOL/L (ref 98–107)
CO2 SERPL-SCNC: 29 MMOL/L (ref 22–29)
COCAINE UR QL SCN: NEGATIVE
CREAT SERPL-MCNC: 1.1 MG/DL (ref 0.7–1.2)
EOSINOPHIL # BLD: 0.12 K/UL (ref 0.05–0.5)
EOSINOPHILS RELATIVE PERCENT: 1 % (ref 0–6)
ERYTHROCYTE [DISTWIDTH] IN BLOOD BY AUTOMATED COUNT: 12.1 % (ref 11.5–15)
ETHANOLAMINE SERPL-MCNC: <10 MG/DL (ref 0–0.08)
FENTANYL UR QL: NEGATIVE
GFR, ESTIMATED: >90 ML/MIN/1.73M2
GLUCOSE SERPL-MCNC: 81 MG/DL (ref 74–99)
HCT VFR BLD AUTO: 49.3 % (ref 37–54)
HGB BLD-MCNC: 17.5 G/DL (ref 12.5–16.5)
IMM GRANULOCYTES # BLD AUTO: 0.03 K/UL (ref 0–0.58)
IMM GRANULOCYTES NFR BLD: 0 % (ref 0–5)
LYMPHOCYTES NFR BLD: 3.8 K/UL (ref 1.5–4)
LYMPHOCYTES RELATIVE PERCENT: 40 % (ref 20–42)
MCH RBC QN AUTO: 32.2 PG (ref 26–35)
MCHC RBC AUTO-ENTMCNC: 35.5 G/DL (ref 32–34.5)
MCV RBC AUTO: 90.8 FL (ref 80–99.9)
METHADONE UR QL: NEGATIVE
MONOCYTES NFR BLD: 0.5 K/UL (ref 0.1–0.95)
MONOCYTES NFR BLD: 5 % (ref 2–12)
NEUTROPHILS NFR BLD: 53 % (ref 43–80)
NEUTS SEG NFR BLD: 5.01 K/UL (ref 1.8–7.3)
OPIATES UR QL SCN: NEGATIVE
OXYCODONE UR QL SCN: NEGATIVE
PCP UR QL SCN: NEGATIVE
PLATELET # BLD AUTO: 231 K/UL (ref 130–450)
PMV BLD AUTO: 11.3 FL (ref 7–12)
POTASSIUM SERPL-SCNC: 3.9 MMOL/L (ref 3.5–5)
PROT SERPL-MCNC: 7.2 G/DL (ref 6.4–8.3)
RBC # BLD AUTO: 5.43 M/UL (ref 3.8–5.8)
SALICYLATES SERPL-MCNC: <0.3 MG/DL (ref 0–30)
SODIUM SERPL-SCNC: 139 MMOL/L (ref 132–146)
TEST INFORMATION: ABNORMAL
TOXIC TRICYCLIC SC,BLOOD: NEGATIVE
WBC OTHER # BLD: 9.5 K/UL (ref 4.5–11.5)

## 2024-07-21 PROCEDURE — 99285 EMERGENCY DEPT VISIT HI MDM: CPT

## 2024-07-21 PROCEDURE — 85025 COMPLETE CBC W/AUTO DIFF WBC: CPT

## 2024-07-21 PROCEDURE — 80053 COMPREHEN METABOLIC PANEL: CPT

## 2024-07-21 PROCEDURE — 80143 DRUG ASSAY ACETAMINOPHEN: CPT

## 2024-07-21 PROCEDURE — 80179 DRUG ASSAY SALICYLATE: CPT

## 2024-07-21 PROCEDURE — 6370000000 HC RX 637 (ALT 250 FOR IP): Performed by: EMERGENCY MEDICINE

## 2024-07-21 PROCEDURE — 80307 DRUG TEST PRSMV CHEM ANLYZR: CPT

## 2024-07-21 PROCEDURE — 93005 ELECTROCARDIOGRAM TRACING: CPT | Performed by: EMERGENCY MEDICINE

## 2024-07-21 PROCEDURE — G0480 DRUG TEST DEF 1-7 CLASSES: HCPCS

## 2024-07-21 RX ORDER — LORAZEPAM 1 MG/1
1 TABLET ORAL ONCE
Status: COMPLETED | OUTPATIENT
Start: 2024-07-21 | End: 2024-07-21

## 2024-07-21 RX ADMIN — LORAZEPAM 1 MG: 1 TABLET ORAL at 23:04

## 2024-07-21 ASSESSMENT — PAIN SCALES - GENERAL: PAINLEVEL_OUTOF10: 7

## 2024-07-21 ASSESSMENT — PAIN - FUNCTIONAL ASSESSMENT: PAIN_FUNCTIONAL_ASSESSMENT: 0-10

## 2024-07-21 ASSESSMENT — PAIN DESCRIPTION - PAIN TYPE: TYPE: CHRONIC PAIN

## 2024-07-21 ASSESSMENT — PAIN DESCRIPTION - DESCRIPTORS: DESCRIPTORS: ACHING

## 2024-07-21 ASSESSMENT — PAIN DESCRIPTION - LOCATION: LOCATION: HEAD

## 2024-07-21 ASSESSMENT — PAIN DESCRIPTION - FREQUENCY: FREQUENCY: CONTINUOUS

## 2024-07-21 ASSESSMENT — PAIN DESCRIPTION - ORIENTATION: ORIENTATION: RIGHT;LEFT

## 2024-07-22 PROBLEM — F31.9 BIPOLAR 1 DISORDER (HCC): Status: ACTIVE | Noted: 2024-07-22

## 2024-07-22 PROCEDURE — 1240000000 HC EMOTIONAL WELLNESS R&B

## 2024-07-22 PROCEDURE — 6370000000 HC RX 637 (ALT 250 FOR IP): Performed by: PSYCHIATRY & NEUROLOGY

## 2024-07-22 RX ORDER — HALOPERIDOL 5 MG/ML
5 INJECTION INTRAMUSCULAR EVERY 6 HOURS PRN
Status: DISCONTINUED | OUTPATIENT
Start: 2024-07-22 | End: 2024-07-30 | Stop reason: HOSPADM

## 2024-07-22 RX ORDER — HALOPERIDOL 5 MG/1
5 TABLET ORAL EVERY 6 HOURS PRN
Status: DISCONTINUED | OUTPATIENT
Start: 2024-07-22 | End: 2024-07-30 | Stop reason: HOSPADM

## 2024-07-22 RX ORDER — MAGNESIUM HYDROXIDE/ALUMINUM HYDROXICE/SIMETHICONE 120; 1200; 1200 MG/30ML; MG/30ML; MG/30ML
30 SUSPENSION ORAL PRN
Status: DISCONTINUED | OUTPATIENT
Start: 2024-07-22 | End: 2024-07-30 | Stop reason: HOSPADM

## 2024-07-22 RX ORDER — ACETAMINOPHEN 325 MG/1
650 TABLET ORAL EVERY 6 HOURS PRN
Status: DISCONTINUED | OUTPATIENT
Start: 2024-07-22 | End: 2024-07-30 | Stop reason: HOSPADM

## 2024-07-22 RX ORDER — NICOTINE 21 MG/24HR
1 PATCH, TRANSDERMAL 24 HOURS TRANSDERMAL DAILY
Status: DISCONTINUED | OUTPATIENT
Start: 2024-07-22 | End: 2024-07-30 | Stop reason: HOSPADM

## 2024-07-22 RX ORDER — LANOLIN ALCOHOL/MO/W.PET/CERES
3 CREAM (GRAM) TOPICAL NIGHTLY PRN
Status: DISCONTINUED | OUTPATIENT
Start: 2024-07-22 | End: 2024-07-29

## 2024-07-22 RX ORDER — HYDROXYZINE PAMOATE 50 MG/1
50 CAPSULE ORAL 3 TIMES DAILY PRN
Status: DISCONTINUED | OUTPATIENT
Start: 2024-07-22 | End: 2024-07-30 | Stop reason: HOSPADM

## 2024-07-22 RX ADMIN — Medication 3 MG: at 20:34

## 2024-07-22 RX ADMIN — HYDROXYZINE PAMOATE 50 MG: 50 CAPSULE ORAL at 16:11

## 2024-07-22 ASSESSMENT — PATIENT HEALTH QUESTIONNAIRE - PHQ9
SUM OF ALL RESPONSES TO PHQ9 QUESTIONS 1 & 2: 6
8. MOVING OR SPEAKING SO SLOWLY THAT OTHER PEOPLE COULD HAVE NOTICED. OR THE OPPOSITE, BEING SO FIGETY OR RESTLESS THAT YOU HAVE BEEN MOVING AROUND A LOT MORE THAN USUAL: NEARLY EVERY DAY
7. TROUBLE CONCENTRATING ON THINGS, SUCH AS READING THE NEWSPAPER OR WATCHING TELEVISION: NEARLY EVERY DAY
SUM OF ALL RESPONSES TO PHQ QUESTIONS 1-9: 27
SUM OF ALL RESPONSES TO PHQ QUESTIONS 1-9: 24
3. TROUBLE FALLING OR STAYING ASLEEP: NEARLY EVERY DAY
4. FEELING TIRED OR HAVING LITTLE ENERGY: NEARLY EVERY DAY
10. IF YOU CHECKED OFF ANY PROBLEMS, HOW DIFFICULT HAVE THESE PROBLEMS MADE IT FOR YOU TO DO YOUR WORK, TAKE CARE OF THINGS AT HOME, OR GET ALONG WITH OTHER PEOPLE: EXTREMELY DIFFICULT
6. FEELING BAD ABOUT YOURSELF - OR THAT YOU ARE A FAILURE OR HAVE LET YOURSELF OR YOUR FAMILY DOWN: NEARLY EVERY DAY
2. FEELING DOWN, DEPRESSED OR HOPELESS: NEARLY EVERY DAY
9. THOUGHTS THAT YOU WOULD BE BETTER OFF DEAD, OR OF HURTING YOURSELF: NEARLY EVERY DAY
5. POOR APPETITE OR OVEREATING: NEARLY EVERY DAY
SUM OF ALL RESPONSES TO PHQ QUESTIONS 1-9: 27
SUM OF ALL RESPONSES TO PHQ QUESTIONS 1-9: 27
1. LITTLE INTEREST OR PLEASURE IN DOING THINGS: NEARLY EVERY DAY

## 2024-07-22 ASSESSMENT — PAIN SCALES - GENERAL: PAINLEVEL_OUTOF10: 0

## 2024-07-22 ASSESSMENT — SLEEP AND FATIGUE QUESTIONNAIRES
SLEEP PATTERN: DIFFICULTY FALLING ASLEEP;DISTURBED/INTERRUPTED SLEEP;EARLY AWAKENING;RESTLESSNESS
DO YOU HAVE DIFFICULTY SLEEPING: YES
AVERAGE NUMBER OF SLEEP HOURS: 4
DO YOU USE A SLEEP AID: COMMENT

## 2024-07-22 NOTE — PROGRESS NOTES
4 Eyes Skin Assessment     NAME:  Jaden Savage III  YOB: 1995  MEDICAL RECORD NUMBER:  [id number]    The patient is being assessed for  Admission    I agree that at least one RN has performed a thorough Head to Toe Skin Assessment on the patient. ALL assessment sites listed below have been assessed.      Areas assessed by both nurses:    Head, Face, Ears, Shoulders, Back, Chest, Arms, Elbows, Hands, Sacrum. Buttock, Coccyx, Ischium, and Legs. Feet and Heels        Does the Patient have a Wound? No noted wound(s)       Jamison Prevention initiated by RN: No  Wound Care Orders initiated by RN: No    Pressure Injury (Stage 3,4, Unstageable, DTI, NWPT, and Complex wounds) if present, place Wound referral order by RN under : No    New Ostomies, if present place, Ostomy referral order under : No     Nurse 1 eSignature: Electronically signed by Stephanie Villagomez RN on 7/22/24 at 4:18 PM EDT    **SHARE this note so that the co-signing nurse can place an eSignature**    Nurse 2 eSignature: {Esignature:[id number]}

## 2024-07-22 NOTE — PROGRESS NOTES
[facility] [place]  Admission Note     Admission Type:   Admission Type: Involuntary    Reason for admission:  Reason for Admission: \"feeling suicidal and angry\"      Addictive Behavior:   Addictive Behavior  In the Past 3 Months, Have You Felt or Has Someone Told You That You Have a Problem With  : None    Medical Problems:   Past Medical History:   Diagnosis Date    ADD (attention deficit disorder with hyperactivity)     Anxiety     Cerebral hemorrhage (HCC)     Cerebral palsy (HCC)     Depression     Dyskinesia     Schizophrenia ([facility])        Status EXAM:  Mental Status and Behavioral Exam  Normal: No  Level of Assistance: Independent/Self  Facial Expression: Avoids Gaze, Sad, Worried  Affect: Congruent  Level of Consciousness: Alert  Frequency of Checks: 4 times per hour, close  Mood:Normal: No  Mood: Anxious, Sad, Helpless, Depressed, Worthless, low self-esteem  Motor Activity:Normal: No  Motor Activity: Repetitive acts, Other (comment) (restlessness)  Eye Contact: Poor  Observed Behavior: Friendly, Cooperative, Tearful, Withdrawn  Sexual Misconduct History: Current - no  Preception: [place] to person, [place] to time, [place] to place, [place] to situation  Attention:Normal: No  Attention: Distractible  Thought Processes: Circumstantial  Thought Content:Normal: No  Thought Content: Delusions  Depression Symptoms: Impaired concentration, Feelings of helplessness, Isolative, Loss of interest, Feelings of hopelessess  Anxiety Symptoms: Generalized  Breana Symptoms: No problems reported or observed., Poor judgment  Hallucinations: None  Delusions: Yes  Delusions: Other (comment) (feels like all of this thoughts will become reality)  Memory:Normal: No  Memory: Poor recent, Poor remote  Insight and Judgment: No  Insight and Judgment: Poor judgment, Poor insight    Tobacco Screening:  Practical Counseling, on admission, graeme X, if applicable and completed (first 3 are required if patient doesn't refuse):
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
## 2024-07-22 NOTE — ED NOTES
Mica RN from [facility] center called with accepting information. Patient accepted by Dr. Park, patient going to [facility]. [facility] . PS needs to be faxed and they will call back with ETA. This RN faxed PS

## 2024-07-22 NOTE — ED NOTES
CANCELLED [facility] & PAS TRANSPORT TO [facility], PER G RN AFTER CONSULTING WITH PSYCH TEAM

## 2024-07-22 NOTE — ED PROVIDER NOTES
performed during the hospital encounter of 07/21/24   SERUM DRUG SCREEN   Result Value Ref Range    Acetaminophen Level <5 (L) 10.0 - 30.0 ug/mL    Ethanol Lvl <10 <10 mg/dL    Salicylate Lvl <0.3 0.0 - 30.0 mg/dL    Toxic Tricyclic Sc,Blood NEGATIVE NEGATIVE   CBC with Auto Differential   Result Value Ref Range    WBC 9.5 4.5 - 11.5 k/uL    RBC 5.43 3.80 - 5.80 m/uL    Hemoglobin 17.5 (H) 12.5 - 16.5 g/dL    Hematocrit 49.3 37.0 - 54.0 %    MCV 90.8 80.0 - 99.9 fL    MCH 32.2 26.0 - 35.0 pg    MCHC 35.5 (H) 32.0 - 34.5 g/dL    RDW 12.1 11.5 - 15.0 %    Platelets 231 130 - 450 k/uL    MPV 11.3 7.0 - 12.0 fL    Neutrophils % 53 43.0 - 80.0 %    Lymphocytes % 40 20.0 - 42.0 %    Monocytes % 5 2.0 - 12.0 %    Eosinophils % 1 0 - 6 %    Basophils % 1 0.0 - 2.0 %    Immature Granulocytes % 0 0.0 - 5.0 %    Neutrophils Absolute 5.01 1.80 - 7.30 k/uL    Lymphocytes Absolute 3.80 1.50 - 4.00 k/uL    Monocytes Absolute 0.50 0.10 - 0.95 k/uL    Eosinophils Absolute 0.12 0.05 - 0.50 k/uL    Basophils Absolute 0.05 0.00 - 0.20 k/uL    Immature Granulocytes Absolute 0.03 0.00 - 0.58 k/uL   CMP   Result Value Ref Range    Sodium 139 132 - 146 mmol/L    Potassium 3.9 3.5 - 5.0 mmol/L    Chloride 101 98 - 107 mmol/L    CO2 29 22 - 29 mmol/L    Anion Gap 9 7 - 16 mmol/L    Glucose 81 74 - 99 mg/dL    BUN 9 6 - 20 mg/dL    Creatinine 1.1 0.70 - 1.20 mg/dL    Est, Glom Filt Rate >90 >60 mL/min/1.73m2    Calcium 9.0 8.6 - 10.2 mg/dL    Total Protein 7.2 6.4 - 8.3 g/dL    Albumin 4.3 3.5 - 5.2 g/dL    Total Bilirubin 0.5 0.0 - 1.2 mg/dL    Alkaline Phosphatase 57 40 - 129 U/L    ALT 26 0 - 40 U/L    AST 23 0 - 39 U/L   EKG 12 Lead   Result Value Ref Range    Ventricular Rate 53 BPM    Atrial Rate 53 BPM    P-R Interval 158 ms    QRS Duration 92 ms    Q-T Interval 412 ms    QTc Calculation (Bazett) 386 ms    P Axis 44 degrees    R Axis 90 degrees    T Axis 55 degrees       RADIOLOGY:  Interpreted by Radiologist.  No orders to display  past medical history of ADD (attention deficit disorder with hyperactivity), Anxiety, Cerebral hemorrhage (HCC), Cerebral palsy (HCC), Depression, Dyskinesia, and Schizophrenia (HCC).     Medical Decision Making  Problems Addressed:  Mood disorder (HCC): acute illness or injury    Amount and/or Complexity of Data Reviewed  External Data Reviewed: ECG and notes.  Labs: ordered. Decision-making details documented in ED Course.  ECG/medicine tests: ordered and independent interpretation performed. Decision-making details documented in ED Course.    Risk  Prescription drug management.  Decision regarding hospitalization.           ED Course as of 07/21/24 2229   Sun Jul 21, 2024 2128 EKG:  This EKG is signed and interpreted by Dr. Cinthya chamberlain MD    Rate: 53  Rhythm: Sinus  Interpretation: Sinus bradycardia, normal CO interval, normal QRS, right axis deviation, normal QT interval, no acute ST or T wave changes  Comparison: stable as compared to patient's most recent EKG   [JA]      ED Course User Index  [JA] Lulu Mendes MD       Patient remained hemodynamically stable throughout ED course.     New Prescriptions    No medications on file       Counseling:   The emergency provider has spoken with the patient and discussed today’s results, in addition to providing specific details for the plan of care and counseling regarding the diagnosis and prognosis.  Questions are answered at this time and they are agreeable with the plan.      --------------------------------- IMPRESSION AND DISPOSITION ---------------------------------    IMPRESSION  1. Mood disorder (HCC)        DISPOSITION  Disposition: Admit to mental health unit - medically cleared for admission  Patient condition is stable      NOTE: This report was transcribed using voice recognition software. Every effort was made to ensure accuracy; however, inadvertent computerized transcription errors may be present    Lulu UMAÑA MD, am the

## 2024-07-22 NOTE — PLAN OF CARE
Problem: Pain  Goal: Verbalizes/displays adequate comfort level or baseline comfort level  Outcome: Progressing     Problem: Risk for Elopement  Goal: Patient will not exit the unit/facility without proper excort  Outcome: Progressing     Problem: Self Harm/Suicidality  Goal: Will have no self-injury during hospital stay  Description: INTERVENTIONS:  1.  Ensure constant observer at bedside with Q15M safety checks  2.  Maintain a safe environment  3.  Secure patient belongings  4.  Ensure family/visitors adhere to safety recommendations  5.  Ensure safety tray has been added to patient's diet order  6.  Every shift and PRN: Re-assess suicidal risk via Frequent Screener    Outcome: Progressing     Problem: Depression  Goal: Will be euthymic at discharge  Description: INTERVENTIONS:  1. Administer medication as ordered  2. Provide emotional support via 1:1 interaction with staff  3. Encourage involvement in milieu/groups/activities  4. Monitor for social isolation  Outcome: Progressing     Problem: Behavior  Goal: Pt/Family maintain appropriate behavior and adhere to behavioral management agreement, if implemented  Description: INTERVENTIONS:  1. Assess patient/family's coping skills and  non-compliant behavior (including use of illegal substances)  2. Notify security of behavior or suspected illegal substances which indicate the need for search of the family and/or belongings  3. Encourage verbalization of thoughts and concerns in a socially appropriate manner  4. Utilize positive, consistent limit setting strategies supporting safety of patient, staff and others  5. Encourage participation in the decision making process about the behavioral management agreement  6. If a visitor's behavior poses a threat to safety call refer to organization policy.  7. Initiate consult with , Psychosocial CNS, Spiritual Care as appropriate  Outcome: Progressing     Problem: Anxiety  Goal: Will report anxiety at

## 2024-07-23 PROCEDURE — 90792 PSYCH DIAG EVAL W/MED SRVCS: CPT | Performed by: NURSE PRACTITIONER

## 2024-07-23 PROCEDURE — 6370000000 HC RX 637 (ALT 250 FOR IP): Performed by: PSYCHIATRY & NEUROLOGY

## 2024-07-23 PROCEDURE — 1240000000 HC EMOTIONAL WELLNESS R&B

## 2024-07-23 RX ADMIN — ACETAMINOPHEN 650 MG: 325 TABLET ORAL at 21:44

## 2024-07-23 RX ADMIN — HYDROXYZINE PAMOATE 50 MG: 50 CAPSULE ORAL at 21:44

## 2024-07-23 RX ADMIN — Medication 3 MG: at 21:44

## 2024-07-23 RX ADMIN — HYDROXYZINE PAMOATE 50 MG: 50 CAPSULE ORAL at 09:27

## 2024-07-23 ASSESSMENT — SLEEP AND FATIGUE QUESTIONNAIRES
DO YOU USE A SLEEP AID: YES
DO YOU HAVE DIFFICULTY SLEEPING: YES
SLEEP PATTERN: DIFFICULTY FALLING ASLEEP;DISTURBED/INTERRUPTED SLEEP;EARLY AWAKENING;RESTLESSNESS
AVERAGE NUMBER OF SLEEP HOURS: 4

## 2024-07-23 ASSESSMENT — PATIENT HEALTH QUESTIONNAIRE - PHQ9
4. FEELING TIRED OR HAVING LITTLE ENERGY: NEARLY EVERY DAY
7. TROUBLE CONCENTRATING ON THINGS, SUCH AS READING THE NEWSPAPER OR WATCHING TELEVISION: NEARLY EVERY DAY
10. IF YOU CHECKED OFF ANY PROBLEMS, HOW DIFFICULT HAVE THESE PROBLEMS MADE IT FOR YOU TO DO YOUR WORK, TAKE CARE OF THINGS AT HOME, OR GET ALONG WITH OTHER PEOPLE: EXTREMELY DIFFICULT
1. LITTLE INTEREST OR PLEASURE IN DOING THINGS: NEARLY EVERY DAY
6. FEELING BAD ABOUT YOURSELF - OR THAT YOU ARE A FAILURE OR HAVE LET YOURSELF OR YOUR FAMILY DOWN: NEARLY EVERY DAY
5. POOR APPETITE OR OVEREATING: NEARLY EVERY DAY
SUM OF ALL RESPONSES TO PHQ9 QUESTIONS 1 & 2: 6
9. THOUGHTS THAT YOU WOULD BE BETTER OFF DEAD, OR OF HURTING YOURSELF: NEARLY EVERY DAY
8. MOVING OR SPEAKING SO SLOWLY THAT OTHER PEOPLE COULD HAVE NOTICED. OR THE OPPOSITE, BEING SO FIGETY OR RESTLESS THAT YOU HAVE BEEN MOVING AROUND A LOT MORE THAN USUAL: NEARLY EVERY DAY
SUM OF ALL RESPONSES TO PHQ QUESTIONS 1-9: 27
2. FEELING DOWN, DEPRESSED OR HOPELESS: NEARLY EVERY DAY
SUM OF ALL RESPONSES TO PHQ QUESTIONS 1-9: 24
SUM OF ALL RESPONSES TO PHQ QUESTIONS 1-9: 27
SUM OF ALL RESPONSES TO PHQ QUESTIONS 1-9: 27
3. TROUBLE FALLING OR STAYING ASLEEP: NEARLY EVERY DAY

## 2024-07-23 ASSESSMENT — PAIN DESCRIPTION - LOCATION: LOCATION: HEAD

## 2024-07-23 ASSESSMENT — PAIN SCALES - GENERAL
PAINLEVEL_OUTOF10: 8
PAINLEVEL_OUTOF10: 0

## 2024-07-23 ASSESSMENT — PAIN - FUNCTIONAL ASSESSMENT: PAIN_FUNCTIONAL_ASSESSMENT: ACTIVITIES ARE NOT PREVENTED

## 2024-07-23 ASSESSMENT — PAIN DESCRIPTION - ORIENTATION: ORIENTATION: RIGHT

## 2024-07-23 ASSESSMENT — PAIN DESCRIPTION - DESCRIPTORS: DESCRIPTORS: PRESSURE;SQUEEZING

## 2024-07-23 NOTE — BH NOTE
Patient denies suicidal ideation, homicidal ideations and AVH. Reports anxiety and depression a 10/10.  Presents flat, sad, tearful, calm and cooperative during assessment. Patient reports that he is upset over a woman friend who he can't get in touch with. She is not answering his calls or returning his messages. He explains to me that this upsets him very much even though that he knows she has a family and is busy. Patient is out on the unit but stays to himself.   Medications taken without issue.  No other complaints or concerns verbalized at this time.  No unit problems reported.  Will continue to observe and support.

## 2024-07-23 NOTE — PLAN OF CARE
Problem: Pain  Goal: Verbalizes/displays adequate comfort level or baseline comfort level  7/22/2024 2332 by Lilian Mac RN  Outcome: Progressing     Problem: Risk for Elopement  Goal: Patient will not exit the unit/facility without proper excort  7/22/2024 2332 by Lilian Mac RN  Outcome: Progressing     Problem: Self Harm/Suicidality  Goal: Will have no self-injury during hospital stay  Description: INTERVENTIONS:  1.  Ensure constant observer at bedside with Q15M safety checks  2.  Maintain a safe environment  3.  Secure patient belongings  4.  Ensure family/visitors adhere to safety recommendations  5.  Ensure safety tray has been added to patient's diet order  6.  Every shift and PRN: Re-assess suicidal risk via Frequent Screener    7/22/2024 2332 by Lilian Mac RN  Outcome: Progressing     Problem: Depression  Goal: Will be euthymic at discharge  Description: INTERVENTIONS:  1. Administer medication as ordered  2. Provide emotional support via 1:1 interaction with staff  3. Encourage involvement in milieu/groups/activities  4. Monitor for social isolation  7/22/2024 2332 by Lilian Mac RN  Outcome: Progressing     Problem: Behavior  Goal: Pt/Family maintain appropriate behavior and adhere to behavioral management agreement, if implemented  Description: INTERVENTIONS:  1. Assess patient/family's coping skills and  non-compliant behavior (including use of illegal substances)  2. Notify security of behavior or suspected illegal substances which indicate the need for search of the family and/or belongings  3. Encourage verbalization of thoughts and concerns in a socially appropriate manner  4. Utilize positive, consistent limit setting strategies supporting safety of patient, staff and others  5. Encourage participation in the decision making process about the behavioral management agreement  6. If a visitor's behavior poses a threat to safety call refer to organization policy.  7. Initiate  consult with , Psychosocial CNS, Spiritual Care as appropriate  7/22/2024 2332 by Lilian Mac, RN  Outcome: Progressing

## 2024-07-23 NOTE — PLAN OF CARE
[facility]  Initial Interdisciplinary Treatment Plan Note      Original treatment plan Date & Time: 07/23/24 0900    Admission Type:  Admission Type: Involuntary    Reason for admission:   Reason for Admission: \"feeling suicidal and angry\"    Estimated Length of Stay:  5-7days  Estimated Discharge Date: To be determined by physician.    PATIENT STRENGTHS:  Patient Strengths:   Patient Strengths and Limitations:Limitations: Perceives need for assistance with self-care, External locus of control, Lacks leisure interests, Difficult relationships / poor social skills, Demonstrates discomfort with /lack of social skills, Multiple barriers to leisure interests  Addictive Behavior: Addictive Behavior  In the Past 3 Months, Have You Felt or Has Someone Told You That You Have a Problem With  : None  Medical Problems:  Past Medical History:   Diagnosis Date    ADD (attention deficit disorder with hyperactivity)     Anxiety     Cerebral hemorrhage (HCC)     Cerebral palsy ([facility])     Depression     Dyskinesia     Schizophrenia ([facility])      Status EXAM:Mental Status and Behavioral Exam  Normal: No  Level of Assistance: Independent/Self  Facial Expression: Avoids Gaze, Flat, Sad, Worried  Affect: Blunt  Level of Consciousness: Alert  Frequency of Checks: 4 times per hour, close  Mood:Normal: No  Mood: Depressed, Anxious, Empty, Helpless, Sad, Worthless, low self-esteem  Motor Activity:Normal: No  Motor Activity: Decreased  Eye Contact: Fair  Observed Behavior: Cooperative, Friendly, Preoccupied  Sexual Misconduct History: Current - no  Preception: [place] to person, [place] to time, [place] to place, [place] to situation  Attention:Normal: No  Attention: Distractible  Thought Processes: Circumstantial  Thought Content:Normal: No  Thought Content: Delusions, Paranoia (per patient)  Depression Symptoms: Impaired concentration, Isolative, Feelings of helplessness, Feelings of hopelessess, Feelings of worthlessness, Sleep

## 2024-07-23 NOTE — GROUP NOTE
Group Therapy Note    Date: 7/23/2024    Group Start Time: 1020  Group End Time: 1040  Group Topic: Community Meeting    [facility] ACUTE BH 2    Lety Hernandez CTRS    Group Therapy Note    Attendees: 8    Date: 7/23/2024  Start Time: 1020  End Time:  1040  Number of Participants: 8    Type of Group: Community Meeting    Was updated on expectations of the unit, staffing, and programming.  Patient denied having a goal for the day.    Status After Intervention:  Improved    Participation Level: Active Listener and Interactive    Participation Quality: Appropriate, Attentive, and Sharing      Speech:  normal      Thought Process/Content: Logical  Linear      Affective Functioning: Congruent      Mood:  Appropriate      Level of consciousness:  Alert and Attentive      Response to Learning: Able to verbalize current knowledge/experience, Able to verbalize/acknowledge new learning, Able to retain information, Capable of insight, Able to change behavior, and Progressing to goal      Endings: None Reported    Modes of Intervention: Education, Support, Socialization, Exploration, Clarifying, and Problem-solving      Discipline Responsible: Psychoeducational Specialist      Signature:  JAE Jones

## 2024-07-23 NOTE — DISCHARGE INSTRUCTIONS
Follow up for Tobacco Cessation at:    [facility]                                 Date:  Friday 8/2 at 10am              [address]    [place], [place] [address]   (Inside [facility]    take B elevators to 7th floor)   Phone: [phone]   Fax: [phone]    Patient was offered a referral to substance abuse treatment, patient declined referral at this time.      [facility]   [address] [id number]    Phone: [phone]   Fax [phone]     [facility]   [address] [id number]   Phone: [phone] [address]   Fax: [phone]     [facility]   [address] [place] [address]   Phone: [phone]   Fax: [phone]     [facility]- children only (18 and younger)  [address] [id number]   Phone: [phone]   Fax: [phone]     [facility]   [address] [place] [address]   Phone: [phone]   Fax: [phone]     [facility]   [address] [place] [address]   Phone: [phone]   Fax: [phone]     [address] [address]   Phone: [phone]   Fax: [phone]     [facility]   [address] [place] [address]    Phone: [phone]   Fax: [phone]     [facility]   Address: [address] [place], [place] [address]   Phone: [phone]   Fax: [phone]     [facility]   [address] [id number]   Phone: [phone]   Fax: [phone]   Please enter at the main entrance and go down the dhaliwal to elevator B, go up to the 7th floor, check in at the first door in the left hallway.     [facility]   [address] [place] [address]   Phone: [phone]   Fax: [phone]     [facility]   [address] , [place] [address]   Phone: [phone]   Fax: [phone]     [facility]   [address], [place] [address]   Phone:[phone]   Fax: [phone]     [facility]   [address], [place] [address]   Phone: [phone]   Fax: [phone]     [facility]    [address], [place] [address]   Phone: [phone]   Fax: [phone]     [facility]   Address: [address], [place] [address]   Phone: [phone]   Fax: [phone]     [facility]   Address: [address] [place] [address]   Phone: [phone]   Fax: [phone]     Dr. Limaolone   [address], [place] [address]   Phone:[phone]   Fax: [phone]     [facility]- counseling only   [address] [id number]   Phone: [phone]   Fax:[phone]     [facility]   [address] [place] [address]   Phone: [phone]   Fax: [phone]     [facility]   [address] , [place] [address]   Phone: [phone]   Fax: [phone]     [facility] (only counseling no medications)    [address], [place] [address]   Phone: [phone]   Fax: [phone]     Maura DEWITT MD   [address] [place] [address]   Phone: [phone]   Fax:[phone]     [facility]   [address], [place] [address]   Phone: [phone]   Fax: [phone]     [facility]   [address] [place] [address]   Phone: [phone]   Fax: [phone]

## 2024-07-23 NOTE — CARE COORDINATION
Biopsychosocial Assessment Note    Social work met with patient to complete the biopsychosocial assessment and C-SSRS.     Chief Complaint: Pt stated that he is currently in the hospital because \"I have been feeling suicidal.\" Pt stated that he has been feeling this way for a while and he wrote a 6 page suicide note.     Mental Status Exam: Pt is alert and oriented x4. Pt's mood is sad and depressed, affect is flat and blunt. Pt's speech is clear, rate is slow and volume is normal. Pt's eye contact is fair. Pt's thoughts process is circumstantial, thought content is linear, pt reported to having intrusive thoughts about paranoid/delusions. Pt's memory is intact, pt is a fair historian. Pt's insight and judgement is poor. Pt was calm and cooperative during assessment and offered good/relevant information. Pt reported current SI, HI towards no one specific, denied AVH.     Clinical Summary: Pt is a 29-year-old male, who presented to the ER due to increased depression with suicidal ideations and a plan to OD on medications. Pt wrote a 6 page suicide note and has had previous suicide attempts. Per ED notes, \"presenting to the ED for suicidal ideation.  Patient states that he has been feeling angry and frustrated for quite some time but it acutely worsened last night.  He is feeling suicidal with a plan to overdose on pills.  He states he has a history of prior suicide attempts and psychiatric admissions.\"    Pt has a previous history of inpatient mental health hospitalizations and stated that his last admissions was with [facility], per chart pt's last admission was 3/10/2024. Pt stated that he is currently active with [facility] but he would like to switch providers as he is not able to see a counselor as often as he would like and the medications are not working. Pt stated that he was also set up with [facility] in the past but was unable to attend due to transportation barriers. Pt stated that he would like

## 2024-07-23 NOTE — PLAN OF CARE
Problem: Risk for Elopement  Goal: Patient will not exit the unit/facility without proper excort  7/23/2024 1056 by Stephanie Villagomez RN  Outcome: Progressing     Problem: Self Harm/Suicidality  Goal: Will have no self-injury during hospital stay  Description: INTERVENTIONS:  1.  Ensure constant observer at bedside with Q15M safety checks  2.  Maintain a safe environment  3.  Secure patient belongings  4.  Ensure family/visitors adhere to safety recommendations  5.  Ensure safety tray has been added to patient's diet order  6.  Every shift and PRN: Re-assess suicidal risk via Frequent Screener    7/23/2024 1056 by Stephanie Villagomez RN  Outcome: Progressing     Problem: Depression  Goal: Will be euthymic at discharge  Description: INTERVENTIONS:  1. Administer medication as ordered  2. Provide emotional support via 1:1 interaction with staff  3. Encourage involvement in milieu/groups/activities  4. Monitor for social isolation  7/23/2024 1056 by Stephanie Villagomez RN  Outcome: Progressing     Problem: Behavior  Goal: Pt/Family maintain appropriate behavior and adhere to behavioral management agreement, if implemented  Description: INTERVENTIONS:  1. Assess patient/family's coping skills and  non-compliant behavior (including use of illegal substances)  2. Notify security of behavior or suspected illegal substances which indicate the need for search of the family and/or belongings  3. Encourage verbalization of thoughts and concerns in a socially appropriate manner  4. Utilize positive, consistent limit setting strategies supporting safety of patient, staff and others  5. Encourage participation in the decision making process about the behavioral management agreement  6. If a visitor's behavior poses a threat to safety call refer to organization policy.  7. Initiate consult with , Psychosocial CNS, Spiritual Care as appropriate  7/23/2024 1056 by Stephanie Villagomez RN  Outcome: Progressing     Problem:  Anxiety  Goal: Will report anxiety at manageable levels  Description: INTERVENTIONS:  1. Administer medication as ordered  2. Teach and rehearse alternative coping skills  3. Provide emotional support with 1:1 interaction with staff  Outcome: Progressing     Problem: Sleep Disturbance  Goal: Will exhibit normal sleeping pattern  Description: INTERVENTIONS:  1. Administer medication as ordered  2. Decrease environmental stimuli, including noise, as appropriate  3. Discourage social isolation and naps during the day  Outcome: Progressing    Patient denies hallucinations. Patient reports SI with a plan to OD on pills. Patient contracts for safety on the unit. Patient reports HI but will not specify against who. Patient is pleasant during conversation. Patient appears anxious, rating both anxiety and depression 10/10. Patient is observed pacing the halls. Patient is attending provided meals and groups. Patient is help seeking. Will continue to offer support and comfort to patient.

## 2024-07-23 NOTE — GROUP NOTE
Group Therapy Note    Date: 7/23/2024    Group Start Time: 1040  Group End Time: 1120  Group Topic: Psychoeducation    [facility] ACUTE BH 2    Lety Hernandez CTRS    Group Therapy Note    Attendees: 9    Date: 7/23/2024  Start Time: 1040  End Time:  1120  Number of Participants: 9    Type of Group: Psychoeducation    Type of Group: Psychoeducation     Name:  Stress vs. Anxiety     Patient's Goal:  Identify differences between stress and anxiety and how to cope with stress and anxiety.     Notes:  CTRS led educational group discussion on stress vs anxiety. Encouraged patients to share their experiences. Patient was actively engaged in group discussion.    Status After Intervention:  Improved    Participation Level: Active Listener and Interactive    Participation Quality: Appropriate, Attentive, and Sharing      Speech:  normal      Thought Process/Content: Logical  Linear      Affective Functioning: Congruent      Mood:  Appropriate      Level of consciousness:  Alert and Attentive      Response to Learning: Able to verbalize current knowledge/experience, Able to verbalize/acknowledge new learning, Able to retain information, Capable of insight, Able to change behavior, and Progressing to goal      Endings: None Reported    Modes of Intervention: Education, Support, Socialization, Exploration, Clarifying, and Problem-solving      Discipline Responsible: Psychoeducational Specialist      Signature:  JAE Jones

## 2024-07-23 NOTE — GROUP NOTE
Group Therapy Note    Date: 7/23/2024    Group Start Time: 1355  Group End Time: 1430  Group Topic: Discharge Planning    [facility] ACUTE BH 2    Carmen Miranda MSW, SHIKHA        Group Therapy Note    Attendees: 8       Patient's Goal:  Pt will be able to identify a safe discharge plan and understand the discharge process.     Notes:  pt participated in group and made connections.     Status After Intervention:  Improved    Participation Level: Active Listener and Interactive    Participation Quality: Appropriate, Attentive, and Sharing      Speech:  normal      Thought Process/Content: Logical      Affective Functioning: Congruent      Mood: anxious      Level of consciousness:  Alert, Oriented x4, and Attentive      Response to Learning: Able to verbalize current knowledge/experience, Able to verbalize/acknowledge new learning, Able to retain information, and Capable of insight      Endings: None Reported    Modes of Intervention: Education, Support, Socialization, Exploration, Clarifying, Problem-solving, and Activity      Discipline Responsible: /Counselor      Signature:  PACHECO Davis LSW

## 2024-07-23 NOTE — H&P
Depakote, Cogentin, and Abilify.        Past Medical History:        Diagnosis Date    ADD (attention deficit disorder with hyperactivity)     Anxiety     Cerebral hemorrhage (HCC)     Cerebral palsy (HCC)     Depression     Dyskinesia     Schizophrenia (HCC)        Medications Prior to Admission:   Medications Prior to Admission: Melatonin 5 MG CAPS, Take 3 mg by mouth nightly as needed (sleep)  divalproex (DEPAKOTE) 500 MG DR tablet, Take 1 tablet by mouth every 12 hours  ARIPiprazole (ABILIFY) 15 MG tablet, Take 1 tablet by mouth daily  [DISCONTINUED] nicotine polacrilex (COMMIT) 2 MG lozenge, Take 1 lozenge by mouth every 2 hours as needed for Smoking cessation  ARIPiprazole lauroxil (ARISTADA) 882 MG/3.2ML PRSY injection, Inject 3.2 mLs into the muscle every 28 days Last dose given on 6/27/2024  [DISCONTINUED] hydrOXYzine pamoate (VISTARIL) 50 MG capsule, Take 1 capsule by mouth nightly as needed for Itching 25-50mg QHS PRN sleep anxiety    Past Surgical History:        Procedure Laterality Date    FOOT SURGERY      HERNIA REPAIR      TONSILLECTOMY         Allergies:   Patient has no known allergies.    Family History  Family History   Problem Relation Age of Onset    Mental Illness Mother     High Blood Pressure Father     Other Father     Mental Illness Brother              EXAMINATION:    REVIEW OF SYSTEMS:    ROS:  [x] All negative/unchanged except if checked. Explain positive(checked items) below:  [] Constitutional  [] Eyes  [] Ear/Nose/Mouth/Throat  [] Respiratory  [] CV  [] GI  []   [] Musculoskeletal  [] Skin/Breast  [] Neurological  [] Endocrine  [] Heme/Lymph  [] Allergic/Immunologic    Explanation:     Vitals:  /85   Pulse 65   Temp 97.4 °F (36.3 °C) (Temporal)   Resp 16   Ht 1.727 m (5' 8\")   Wt 79.4 kg (175 lb)   SpO2 99%   BMI 26.61 kg/m²      Physical Examination:   Head: x  Atraumatic: x normocephalic  Skin and Mucosa        Moist x  Dry   Pale  x Normal   Neck:  Thyroid   checks. Okay to discontinue the 1:1,moderate risk . They have the following  Risk Factors: Prior suicide attempts   Family Mental Health history/ family history of suicide   Several inpatient psychiatric admissions   Transportation barriers   Impulsive behaviors   Substance Use   History of Trauma   Multiple stressors   Helpless/hopeless   Little interest and pleasure in doing things   Poor sleep   Poor appetite   Feeling like a failure      Protective Factors: Supportive friend  Outpatient provider   Active in the community   Medication compliant   Good communication Skills   Help-seeking behavior   Good insight   Spiritual/ Adventism Beliefs   Goals & Hope for the future   Safe and stable housing   Access to essential/ basic needs   No access to weapons   Steady income     Collateral Information:  Will obtain collateral information from the family or friends.  Will obtain medical records as appropriate from out patient providers  Will consult the hospitalist for a physical exam to rule out any co-morbid physical condition.    Home medication Reconciled   Reviewed and continued as clinically indicated     New Medications started during this admission :    Aristada injection due 7/27/24  Cogentin 1 mg bid  Depakote 500 mg BID  VPA level    Prn Haldol 5mg and Vistaril 50mg q6hr for extreme agitation.  Trazodone as ordered for insomnia  Vistaril as ordered for anxiety      Psychotherapy:   Encourage participation in milieu and group therapy  Individual therapy as needed      NOTE: This report was transcribed using voice recognition software. Every effort was made to ensure accuracy; however, inadvertent computerized transcription errors may be present.    Behavioral Services  Medicare Certification Upon Admission    I certify that this patient's inpatient psychiatric hospital admission is medically necessary for:    [x] (1) Treatment which could reasonably be expected to improve this patient's condition,       [x] (2)

## 2024-07-24 LAB
DATE LAST DOSE: ABNORMAL
EKG ATRIAL RATE: 53 BPM
EKG P AXIS: 44 DEGREES
EKG P-R INTERVAL: 158 MS
EKG Q-T INTERVAL: 412 MS
EKG QRS DURATION: 92 MS
EKG QTC CALCULATION (BAZETT): 386 MS
EKG R AXIS: 90 DEGREES
EKG T AXIS: 55 DEGREES
EKG VENTRICULAR RATE: 53 BPM
TME LAST DOSE: ABNORMAL H
VALPROATE SERPL-MCNC: <3 UG/ML (ref 50–100)
VANCOMYCIN DOSE: ABNORMAL MG

## 2024-07-24 PROCEDURE — 36415 COLL VENOUS BLD VENIPUNCTURE: CPT

## 2024-07-24 PROCEDURE — 1240000000 HC EMOTIONAL WELLNESS R&B

## 2024-07-24 PROCEDURE — 6370000000 HC RX 637 (ALT 250 FOR IP): Performed by: NURSE PRACTITIONER

## 2024-07-24 PROCEDURE — 99232 SBSQ HOSP IP/OBS MODERATE 35: CPT | Performed by: NURSE PRACTITIONER

## 2024-07-24 PROCEDURE — 80164 ASSAY DIPROPYLACETIC ACD TOT: CPT

## 2024-07-24 PROCEDURE — 6370000000 HC RX 637 (ALT 250 FOR IP): Performed by: PSYCHIATRY & NEUROLOGY

## 2024-07-24 RX ORDER — BENZTROPINE MESYLATE 1 MG/1
1 TABLET ORAL 2 TIMES DAILY
Status: DISCONTINUED | OUTPATIENT
Start: 2024-07-24 | End: 2024-07-30 | Stop reason: HOSPADM

## 2024-07-24 RX ORDER — ARIPIPRAZOLE 15 MG/1
15 TABLET ORAL DAILY
Status: DISCONTINUED | OUTPATIENT
Start: 2024-07-25 | End: 2024-07-30 | Stop reason: HOSPADM

## 2024-07-24 RX ORDER — ARIPIPRAZOLE 10 MG/1
10 TABLET ORAL DAILY
Status: COMPLETED | OUTPATIENT
Start: 2024-07-24 | End: 2024-07-24

## 2024-07-24 RX ORDER — DIVALPROEX SODIUM 500 MG/1
500 TABLET, DELAYED RELEASE ORAL EVERY 12 HOURS SCHEDULED
Status: DISCONTINUED | OUTPATIENT
Start: 2024-07-24 | End: 2024-07-30 | Stop reason: HOSPADM

## 2024-07-24 RX ADMIN — ARIPIPRAZOLE 10 MG: 10 TABLET ORAL at 08:57

## 2024-07-24 RX ADMIN — HYDROXYZINE PAMOATE 50 MG: 50 CAPSULE ORAL at 12:27

## 2024-07-24 RX ADMIN — Medication 3 MG: at 21:00

## 2024-07-24 RX ADMIN — ACETAMINOPHEN 650 MG: 325 TABLET ORAL at 10:09

## 2024-07-24 RX ADMIN — DIVALPROEX SODIUM 500 MG: 500 TABLET, DELAYED RELEASE ORAL at 08:57

## 2024-07-24 RX ADMIN — ACETAMINOPHEN 650 MG: 325 TABLET ORAL at 21:01

## 2024-07-24 RX ADMIN — DIVALPROEX SODIUM 500 MG: 500 TABLET, DELAYED RELEASE ORAL at 21:00

## 2024-07-24 RX ADMIN — HYDROXYZINE PAMOATE 50 MG: 50 CAPSULE ORAL at 21:00

## 2024-07-24 RX ADMIN — BENZTROPINE MESYLATE 1 MG: 1 TABLET ORAL at 21:00

## 2024-07-24 RX ADMIN — BENZTROPINE MESYLATE 1 MG: 1 TABLET ORAL at 08:57

## 2024-07-24 ASSESSMENT — PAIN SCALES - GENERAL
PAINLEVEL_OUTOF10: 0
PAINLEVEL_OUTOF10: 4
PAINLEVEL_OUTOF10: 7
PAINLEVEL_OUTOF10: 8

## 2024-07-24 ASSESSMENT — PAIN DESCRIPTION - LOCATION
LOCATION: HEAD
LOCATION: HEAD

## 2024-07-24 ASSESSMENT — PAIN DESCRIPTION - DESCRIPTORS
DESCRIPTORS: ACHING
DESCRIPTORS: ACHING;PINS AND NEEDLES

## 2024-07-24 NOTE — PLAN OF CARE
Problem: Self Harm/Suicidality  Goal: Will have no self-injury during hospital stay  Description: INTERVENTIONS:  1.  Ensure constant observer at bedside with Q15M safety checks  2.  Maintain a safe environment  3.  Secure patient belongings  4.  Ensure family/visitors adhere to safety recommendations  5.  Ensure safety tray has been added to patient's diet order  6.  Every shift and PRN: Re-assess suicidal risk via Frequent Screener    7/23/2024 2251 by Lilian Mac RN  Outcome: Not Progressing     Problem: Depression  Goal: Will be euthymic at discharge  Description: INTERVENTIONS:  1. Administer medication as ordered  2. Provide emotional support via 1:1 interaction with staff  3. Encourage involvement in milieu/groups/activities  4. Monitor for social isolation  7/23/2024 2251 by Lilian Mac RN  Outcome: Not Progressing     Problem: Pain  Goal: Verbalizes/displays adequate comfort level or baseline comfort level  Outcome: Progressing     Problem: Risk for Elopement  Goal: Patient will not exit the unit/facility without proper excort  7/23/2024 2251 by Lilian Mac RN  Outcome: Progressing     Problem: Behavior  Goal: Pt/Family maintain appropriate behavior and adhere to behavioral management agreement, if implemented  Description: INTERVENTIONS:  1. Assess patient/family's coping skills and  non-compliant behavior (including use of illegal substances)  2. Notify security of behavior or suspected illegal substances which indicate the need for search of the family and/or belongings  3. Encourage verbalization of thoughts and concerns in a socially appropriate manner  4. Utilize positive, consistent limit setting strategies supporting safety of patient, staff and others  5. Encourage participation in the decision making process about the behavioral management agreement  6. If a visitor's behavior poses a threat to safety call refer to organization policy.  7. Initiate consult with ,  Psychosocial CNS, Spiritual Care as appropriate  7/23/2024 2251 by Lilian Mac RN  Outcome: Progressing     Problem: Anxiety  Goal: Will report anxiety at manageable levels  Description: INTERVENTIONS:  1. Administer medication as ordered  2. Teach and rehearse alternative coping skills  3. Provide emotional support with 1:1 interaction with staff  7/23/2024 2251 by Lilian Mac RN  Outcome: Progressing     Problem: Self Harm/Suicidality  Goal: Will have no self-injury during hospital stay  Description: INTERVENTIONS:  1.  Ensure constant observer at bedside with Q15M safety checks  2.  Maintain a safe environment  3.  Secure patient belongings  4.  Ensure family/visitors adhere to safety recommendations  5.  Ensure safety tray has been added to patient's diet order  6.  Every shift and PRN: Re-assess suicidal risk via Frequent Screener    7/23/2024 2251 by Lilian Mac RN  Outcome: Not Progressing  7/23/2024 1056 by Stephanie Villagomez RN  Outcome: Progressing     Problem: Depression  Goal: Will be euthymic at discharge  Description: INTERVENTIONS:  1. Administer medication as ordered  2. Provide emotional support via 1:1 interaction with staff  3. Encourage involvement in milieu/groups/activities  4. Monitor for social isolation  7/23/2024 2251 by Lilian Mac RN  Outcome: Not Progressing  7/23/2024 1056 by Stephanie Villagomez RN  Outcome: Progressing

## 2024-07-24 NOTE — PROGRESS NOTES
BEHAVIORAL HEALTH FOLLOW-UP NOTE     7/24/2024     Patient was seen and examined in person, Chart reviewed   Patient's case discussed with staff/team    Chief Complaint: worried, anxious, intrusive thoughts, irritability    Interim History:     Patient is out on the unit, he is now denying all and significantly minimizing the circumstances of his hospitalization. He states that he is not suicidal today. He is in need of a shower with poor hygiene he is malodorous. Patient encouraged to shower today. He states that he has not been eating well and has some nausea, he tells me he threw up after eating breakfast this morning.   He begins perseverating on feeling like the woman he contacted through social media is avoiding him. He states that he has unresolved feelings about her going back 13 years, he states that he previously had feelings for her but has not seen her since she moved 13 years ago and knows that she is  and has two children under the age of 10. He states that he is not trying to involve himself with her, he just wants to hear back from her, he needs reassurance about this, he states that she is probably busy which is why she hasn't contacted him.   His thought process is unstable, he has limited ability to abstract.     He has reported that he knows when he is due for his shot, because of increase in symptoms. Patient may benefit from higher dose of PO supplementation week injection is due, or may consider shot every 3 weeks     Appetite:   [] Normal/Unchanged  [] Increased  [x] Decreased      Sleep:       [] Normal/Unchanged  [x] Fair       [] Poor              Energy:    [] Normal/Unchanged  [] Increased  [x] Decreased        SI [] Present  [x] Absent    HI  []Present  [x] Absent     Aggression:  [] yes  [x] no    Patient is [x] able  [] unable to CONTRACT FOR SAFETY     PAST MEDICAL/PSYCHIATRIC HISTORY:   Past Medical History:   Diagnosis Date    ADD (attention deficit disorder with  (7/22/2024)    Housing Stability Vital Sign     Unable to Pay for Housing in the Last Year: No     Number of Places Lived in the Last Year: 1     Unstable Housing in the Last Year: No           ROS:  [x] All negative/unchanged except if checked. Explain positive(checked items) below:  [] Constitutional  [] Eyes  [] Ear/Nose/Mouth/Throat  [] Respiratory  [] CV  [] GI  []   [] Musculoskeletal  [] Skin/Breast  [] Neurological  [] Endocrine  [] Heme/Lymph  [] Allergic/Immunologic    Explanation:     MEDICATIONS:    Current Facility-Administered Medications:     ARIPiprazole (ABILIFY) tablet 10 mg, 10 mg, Oral, Daily, Chandni Garcia APRN - CNP    divalproex (DEPAKOTE) DR tablet 500 mg, 500 mg, Oral, 2 times per day, Chandni Garcia APRN - CNP    [START ON 7/25/2024] ARIPiprazole (ABILIFY) tablet 15 mg, 15 mg, Oral, Daily, Chandni Garcia APRN - CNP    benztropine (COGENTIN) tablet 1 mg, 1 mg, Oral, BID, Chandni Garcia APRN - CNP    acetaminophen (TYLENOL) tablet 650 mg, 650 mg, Oral, Q6H PRN, Trinity Carrion MD, 650 mg at 07/23/24 2144    magnesium hydroxide (MILK OF MAGNESIA) 400 MG/5ML suspension 30 mL, 30 mL, Oral, Daily PRN, Trinity Carrion MD    nicotine (NICODERM CQ) 21 MG/24HR 1 patch, 1 patch, TransDERmal, Daily, Trinity Carrion MD    aluminum & magnesium hydroxide-simethicone (MAALOX) 200-200-20 MG/5ML suspension 30 mL, 30 mL, Oral, PRN, Trinity Carrion MD    hydrOXYzine pamoate (VISTARIL) capsule 50 mg, 50 mg, Oral, TID PRN, Trinity Carrion MD, 50 mg at 07/23/24 2144    haloperidol (HALDOL) tablet 5 mg, 5 mg, Oral, Q6H PRN **OR** haloperidol lactate (HALDOL) injection 5 mg, 5 mg, IntraMUSCular, Q6H PRN, Norman Carriond N, MD    melatonin tablet 3 mg, 3 mg, Oral, Nightly Sheyla CAMARENA Muhammad N, MD, 3 mg at 07/23/24 9547      Examination:  /74   Pulse 78   Temp 98.3 °F (36.8 °C) (Temporal)   Resp 16   Ht 1.727 m (5' 8\")   Wt 79.4 kg (175 lb)   SpO2 97%   BMI 26.61 kg/m²   Gait

## 2024-07-24 NOTE — GROUP NOTE
Group Therapy Note    Date: 7/24/2024  Start Time: 0800  End Time:  0810  Number of Participants: 11    Type of Group: Community Meeting    Wellness Binder Information  Module Name:  Community Meeting      Patient's Goal:  Patient will be oriented to the unit including but not limited expectations, staff, programming schedule.  Patient will be able to ID expectations of treatment.     Notes: Patient was a participant in community meeting, and was updated on expectations of the unit, staffing, programming schedule, and acclimated to their environment.    Patient shared goal for today as \"trying to think more positive\"    Status After Intervention:  Improved    Participation Level: Active Listener and Interactive    Participation Quality: Appropriate and Attentive      Speech:  normal      Thought Process/Content: Logical      Affective Functioning: Congruent      Mood:  content      Level of consciousness:  alert and attentive      Response to Learning: Able to verbalize current knowledge/experience, Able to verbalize/acknowledge new learning, and Progressing to goal      Endings: None Reported    Modes of Intervention: Exploration, Clarifying, and Problem-solving      Discipline Responsible: Recreational Therapist      Signature:  JAE Maki

## 2024-07-24 NOTE — CARE COORDINATION
Collateral Contact (WEI signed)  Name: Jaden  Relationship: Father  Number: [phone] (DO NOT DISCLOSE COCAINE OR ECSTASY USE)      Collateral Information: SW called pt's dad Jaden [phone] (WEI signed) to obtain collateral information and discuss discharge planning. CLAUDE spoke with dad who stated that he is unsure why the pt is here. Dad stated that the pt asked to come into the hospital and he has been taking his medications. Dad stated that the pt called yesterday and he was yelling and screaming at them and then an hour later called back and was calm, dad stated that the pt has been having mood swings. Dad stated that the pt is normally calm and talkative. Dad stated that the pt lives with him and he is able to return back home. Dad stated that the  or brother in law will pick the pt up. Dad denied access to any guns/weapons. Dad stated that his only concern is that the pt is on medications that work for him and they are easy to manage for the pt. Dad stated that the pt needs a new provider and Jake will not see him when he needs help and they are scheduling him months out.                    Access to Weapons per Collateral Contact: [] Reports [x] Denies

## 2024-07-24 NOTE — PLAN OF CARE
Problem: Risk for Elopement  Goal: Patient will not exit the unit/facility without proper excort  Outcome: Progressing     Problem: Self Harm/Suicidality  Goal: Will have no self-injury during hospital stay  Description: INTERVENTIONS:  1.  Ensure constant observer at bedside with Q15M safety checks  2.  Maintain a safe environment  3.  Secure patient belongings  4.  Ensure family/visitors adhere to safety recommendations  5.  Ensure safety tray has been added to patient's diet order  6.  Every shift and PRN: Re-assess suicidal risk via Frequent Screener    Outcome: Progressing     Problem: Depression  Goal: Will be euthymic at discharge  Description: INTERVENTIONS:  1. Administer medication as ordered  2. Provide emotional support via 1:1 interaction with staff  3. Encourage involvement in milieu/groups/activities  4. Monitor for social isolation  Outcome: Progressing     Problem: Behavior  Goal: Pt/Family maintain appropriate behavior and adhere to behavioral management agreement, if implemented  Description: INTERVENTIONS:  1. Assess patient/family's coping skills and  non-compliant behavior (including use of illegal substances)  2. Notify security of behavior or suspected illegal substances which indicate the need for search of the family and/or belongings  3. Encourage verbalization of thoughts and concerns in a socially appropriate manner  4. Utilize positive, consistent limit setting strategies supporting safety of patient, staff and others  5. Encourage participation in the decision making process about the behavioral management agreement  6. If a visitor's behavior poses a threat to safety call refer to organization policy.  7. Initiate consult with , Psychosocial CNS, Spiritual Care as appropriate  Outcome: Progressing     Problem: Anxiety  Goal: Will report anxiety at manageable levels  Description: INTERVENTIONS:  1. Administer medication as ordered  2. Teach and rehearse alternative

## 2024-07-24 NOTE — PROGRESS NOTES
[facility]  Day 3 Interdisciplinary Treatment Plan NOTE    Review Date & Time: 07/24/2024 0900    Admission Type:   Admission Type: Involuntary    Reason for admission:  Reason for Admission: \"feeling suicidal and angry\"  Estimated Length of Stay: 5-7 days  Estimated Discharge Date Update: to be determined by physician    PATIENT STRENGTHS:  Patient Strengths    Patient Strengths and Limitations:Limitations: Perceives need for assistance with self-care, External locus of control, Lacks leisure interests, Difficult relationships / poor social skills, Demonstrates discomfort with /lack of social skills, Multiple barriers to leisure interests  Addictive Behavior:Addictive Behavior  In the Past 3 Months, Have You Felt or Has Someone Told You That You Have a Problem With  : None  Medical Problems:  Past Medical History:   Diagnosis Date    ADD (attention deficit disorder with hyperactivity)     Anxiety     Cerebral hemorrhage (HCC)     Cerebral palsy (HCC)     Depression     Dyskinesia     Schizophrenia (HCC)        Risk:  Fall Risk   Jamison Scale Jamison Scale Score: 22  BVC    Change in scores no Changes to plan of Care no    Status EXAM:   Mental Status and Behavioral Exam  Normal: No  Level of Assistance: Independent/Self  Facial Expression: Worried  Affect: Congruent  Level of Consciousness: Alert  Frequency of Checks: 4 times per hour, close  Mood:Normal: No  Mood: Anxious, Depressed, Helpless  Motor Activity:Normal: Yes  Motor Activity: Decreased  Eye Contact: Good  Observed Behavior: Friendly, Cooperative  Sexual Misconduct History: Current - no  Preception: [place] to person, [place] to time, [place] to situation, [place] to place  Attention:Normal: Yes  Attention: Distractible  Thought Processes: Circumstantial  Thought Content:Normal: Yes  Thought Content: Paranoia, Delusions  Depression Symptoms: Feelings of helplessness  Anxiety Symptoms: Generalized  Breana Symptoms: No problems reported or  observed.  Hallucinations: None  Delusions: No  Delusions: Paranoid  Memory:Normal: Yes  Memory: Poor recent, Poor remote  Insight and Judgment: No  Insight and Judgment: Poor insight, Poor judgment    Daily Assessment Last Entry:   Daily Sleep (WDL): Within Defined Limits            Daily Nutrition (WDL): Within Defined Limits  Level of Assistance: Independent/Self    Patient Monitoring:  Frequency of Checks: 4 times per hour, close    Psychiatric Symptoms:   Depression Symptoms  Depression Symptoms: Feelings of helplessness  Anxiety Symptoms  Anxiety Symptoms: Generalized  Breana Symptoms  Breana Symptoms: No problems reported or observed.          Suicide Risk CSSR-S:  1) Within the past month, have you wished you were dead or wished you could go to sleep and not wake up? : Yes  2) Have you actually had any thoughts of killing yourself? : Yes  3) Have you been thinking about how you might kill yourself? : Yes  5) Have you started to work out or worked out the details of how to kill yourself? Do you intend to carry out this plan? : Yes  6) Have you ever done anything, started to do anything, or prepared to do anything to end your life?: Yes  Change in Result no   Change in Plan of care no      EDUCATION:   EDUCATION:   Learner Progress Toward Treatment Goals: Reviewed results and recommendations of this team, Reviewed group plan and strategies, Reviewed signs, symptoms and risk of self harm and violent behavior, Reviewed goals and plan of care    Method:small group, individual verbal education    Outcome:verbalized by patient, but needs reinforcement to obtain goals    PATIENT GOALS:  Short term:Try to think more positive  Long term: None at this time    PLAN/TREATMENT RECOMMENDATIONS UPDATE: continue with group therapies, increased socialization, continue planning for after discharge goals, continue with medication compliance    SHORT-TERM GOALS UPDATE:   Time frame for Short-Term Goals: 5-7 days    LONG-TERM GOALS

## 2024-07-24 NOTE — BH NOTE
Patient denies homicidal ideations and AVH. Patient does report that he is having SI with a plan to OD. Contracts for safety. States he spoke with his parents on the phone earlier today and they upset him. He stated they just don't seem to care and he feels unloved.   Presents friendly, flat, sad, calm and cooperative during assessment.  Patient is out on the unit and is mostly isolative to himself.  Medications taken without issue.  No complaints or concerns verbalized at this time.  No unit problems reported.  Will continue to observe and support.

## 2024-07-24 NOTE — GROUP NOTE
Group Therapy Note    Date: 7/24/2024    Group Start Time: 1400  Group End Time: 1430  Group Topic: Cognitive Skills    [facility]  2    Carmen Miranda MSW, SHIKHA        Group Therapy Note    Attendees: 7       Patient's Goal:  Pt will be able to identify new self care they would like to start doing, good times to complete self care and the importance of self care.     Notes:  pt participated in group and made connections.    Status After Intervention:  Improved    Participation Level: Active Listener and Interactive    Participation Quality: Appropriate, Attentive, Sharing, and Supportive      Speech:  normal      Thought Process/Content: Logical  Linear      Affective Functioning: Congruent      Mood: anxious      Level of consciousness:  Alert, Oriented x4, and Attentive      Response to Learning: Able to verbalize current knowledge/experience, Able to verbalize/acknowledge new learning, Able to retain information, and Capable of insight      Endings: None Reported    Modes of Intervention: Education, Support, Socialization, Exploration, Clarifying, Problem-solving, and Activity      Discipline Responsible: /Counselor      Signature:  PACHECO Davis, SHIKHA

## 2024-07-25 PROCEDURE — 6370000000 HC RX 637 (ALT 250 FOR IP): Performed by: NURSE PRACTITIONER

## 2024-07-25 PROCEDURE — 1240000000 HC EMOTIONAL WELLNESS R&B

## 2024-07-25 PROCEDURE — 6370000000 HC RX 637 (ALT 250 FOR IP): Performed by: PSYCHIATRY & NEUROLOGY

## 2024-07-25 PROCEDURE — 99232 SBSQ HOSP IP/OBS MODERATE 35: CPT | Performed by: NURSE PRACTITIONER

## 2024-07-25 RX ADMIN — HYDROXYZINE PAMOATE 50 MG: 50 CAPSULE ORAL at 21:34

## 2024-07-25 RX ADMIN — DIVALPROEX SODIUM 500 MG: 500 TABLET, DELAYED RELEASE ORAL at 09:00

## 2024-07-25 RX ADMIN — Medication 3 MG: at 21:34

## 2024-07-25 RX ADMIN — HYDROXYZINE PAMOATE 50 MG: 50 CAPSULE ORAL at 05:08

## 2024-07-25 RX ADMIN — BENZTROPINE MESYLATE 1 MG: 1 TABLET ORAL at 21:34

## 2024-07-25 RX ADMIN — BENZTROPINE MESYLATE 1 MG: 1 TABLET ORAL at 09:00

## 2024-07-25 RX ADMIN — ARIPIPRAZOLE 15 MG: 15 TABLET ORAL at 09:00

## 2024-07-25 RX ADMIN — DIVALPROEX SODIUM 500 MG: 500 TABLET, DELAYED RELEASE ORAL at 21:34

## 2024-07-25 RX ADMIN — ACETAMINOPHEN 650 MG: 325 TABLET ORAL at 21:34

## 2024-07-25 ASSESSMENT — PAIN DESCRIPTION - FREQUENCY: FREQUENCY: CONTINUOUS

## 2024-07-25 ASSESSMENT — PAIN DESCRIPTION - PAIN TYPE: TYPE: ACUTE PAIN

## 2024-07-25 ASSESSMENT — PAIN DESCRIPTION - ONSET: ONSET: GRADUAL

## 2024-07-25 ASSESSMENT — PAIN DESCRIPTION - LOCATION
LOCATION: HEAD

## 2024-07-25 ASSESSMENT — PAIN DESCRIPTION - DESCRIPTORS
DESCRIPTORS: ACHING;DISCOMFORT;SORE
DESCRIPTORS: ACHING
DESCRIPTORS: PRESSURE

## 2024-07-25 ASSESSMENT — PAIN SCALES - GENERAL
PAINLEVEL_OUTOF10: 4
PAINLEVEL_OUTOF10: 8
PAINLEVEL_OUTOF10: 4

## 2024-07-25 ASSESSMENT — PAIN DESCRIPTION - ORIENTATION: ORIENTATION: RIGHT

## 2024-07-25 ASSESSMENT — PAIN - FUNCTIONAL ASSESSMENT: PAIN_FUNCTIONAL_ASSESSMENT: ACTIVITIES ARE NOT PREVENTED

## 2024-07-25 NOTE — GROUP NOTE
Group Therapy Note    Date: 7/25/2024    Group Start Time: 1015  Group End Time: 1030  Group Topic: Community Meeting    [facility] 2    Lety Hernandez CTRS    Group Therapy Note    Attendees: 8    Date: 7/25/2024  Start Time: 1015  End Time:  1030  Number of Participants: 8    Type of Group: Community Meeting    Was updated on expectations of the unit, staffing, and programming.  Patient shared goal for today as \"Work on getting myself more stable.\"    Status After Intervention:  Improved    Participation Level: Active Listener and Interactive    Participation Quality: Appropriate, Attentive, and Sharing      Speech:  normal      Thought Process/Content: Logical  Linear      Affective Functioning: Congruent      Mood:  Appropriate      Level of consciousness:  Alert and Attentive      Response to Learning: Able to verbalize current knowledge/experience, Able to verbalize/acknowledge new learning, Able to retain information, Capable of insight, Able to change behavior, and Progressing to goal      Endings: None Reported    Modes of Intervention: Education, Support, Socialization, Exploration, Clarifying, and Problem-solving      Discipline Responsible: Psychoeducational Specialist      Signature:  JAE Jones

## 2024-07-25 NOTE — GROUP NOTE
Group Therapy Note    Date: 7/25/2024    Group Start Time: 1030  Group End Time: 1110  Group Topic: Psychoeducation    [facility] 2    Lety Hernandez CTRS    Group Therapy Note    Attendees: 9    Date: 7/25/2024  Start Time: 1030  End Time:  1110  Number of Participants: 9    Type of Group: Psychoeducation    Name:  Values     Patient's Goal:  Identify personal values and how they impact behavior and mental health.     Notes:  CTRS led educational group discussion on values. Encouraged patients to share their experiences. Patient was actively engaged in group discussion and made positive statements throughout group.    Status After Intervention:  Improved    Participation Level: Active Listener and Interactive    Participation Quality: Appropriate, Attentive, and Sharing      Speech:  normal      Thought Process/Content: Logical  Linear      Affective Functioning: Congruent      Mood:  Appropriate      Level of consciousness:  Alert and Attentive      Response to Learning: Able to verbalize current knowledge/experience, Able to verbalize/acknowledge new learning, Able to retain information, Capable of insight, Able to change behavior, and Progressing to goal      Endings: None Reported    Modes of Intervention: Education, Support, Socialization, Exploration, Clarifying, and Problem-solving      Discipline Responsible: Psychoeducational Specialist      Signature:  JAE Jones

## 2024-07-25 NOTE — GROUP NOTE
Group Therapy Note    Date: 7/25/2024    Group Start Time: 1400  Group End Time: 1500  Group Topic: Cognitive Skills    [facility] 2    Carmen Miranda MSW, SHIKHA        Group Therapy Note    Attendees: 8       Patient's Goal:  Pt will be able to identify how music effects their mood and how they are able to relate to different songs when based on their current mood.     Notes:  pt participated in group and made connections.    Status After Intervention:  Improved    Participation Level: Active Listener and Interactive    Participation Quality: Appropriate, Attentive, Sharing, and Supportive      Speech:  normal      Thought Process/Content: Logical  Linear      Affective Functioning: Congruent      Mood: anxious      Level of consciousness:  Alert, Oriented x4, and Attentive      Response to Learning: Able to verbalize current knowledge/experience, Able to verbalize/acknowledge new learning, Able to retain information, and Capable of insight      Endings: None Reported    Modes of Intervention: Education, Support, Socialization, Exploration, Clarifying, and Problem-solving      Discipline Responsible: /Counselor      Signature:  PACHECO Davis LSW

## 2024-07-25 NOTE — PLAN OF CARE
Problem: Self Harm/Suicidality  Goal: Will have no self-injury during hospital stay  Description: INTERVENTIONS:  1.  Ensure constant observer at bedside with Q15M safety checks  2.  Maintain a safe environment  3.  Secure patient belongings  4.  Ensure family/visitors adhere to safety recommendations  5.  Ensure safety tray has been added to patient's diet order  6.  Every shift and PRN: Re-assess suicidal risk via Frequent Screener    7/24/2024 2154 by Jackeline Colon, RN  Outcome: Progressing     Problem: Anxiety  Goal: Will report anxiety at manageable levels  Description: INTERVENTIONS:  1. Administer medication as ordered  2. Teach and rehearse alternative coping skills  3. Provide emotional support with 1:1 interaction with staff  7/24/2024 2154 by Jackeline Colon, RN  Outcome: Progressing     Patient has been out on the unit, social with select peers. Often pacing around the unit. Affect is flat and mood is anxious. Cooperative during assessment. Rates anxiety 7/10 and depression 10/10 d/t \"an issue with my friend\". Patient did endorse suicidal ideations with no plan. Reports feeling safe while on the unit and that he wouldn't do anything \"rash\". Emotional support offered. Patient spoke about effective coping skills that he can use when feeling anxious and overwhelmed. Denies homicidal ideations and hallucinations. Purposeful rounding continued.

## 2024-07-25 NOTE — PLAN OF CARE
Problem: Risk for Elopement  Goal: Patient will not exit the unit/facility without proper excort  Outcome: Progressing     Problem: Self Harm/Suicidality  Goal: Will have no self-injury during hospital stay  Description: INTERVENTIONS:  1.  Ensure constant observer at bedside with Q15M safety checks  2.  Maintain a safe environment  3.  Secure patient belongings  4.  Ensure family/visitors adhere to safety recommendations  5.  Ensure safety tray has been added to patient's diet order  6.  Every shift and PRN: Re-assess suicidal risk via Frequent Screener    Outcome: Progressing     Problem: Depression  Goal: Will be euthymic at discharge  Description: INTERVENTIONS:  1. Administer medication as ordered  2. Provide emotional support via 1:1 interaction with staff  3. Encourage involvement in milieu/groups/activities  4. Monitor for social isolation  Outcome: Progressing     Problem: Behavior  Goal: Pt/Family maintain appropriate behavior and adhere to behavioral management agreement, if implemented  Description: INTERVENTIONS:  1. Assess patient/family's coping skills and  non-compliant behavior (including use of illegal substances)  2. Notify security of behavior or suspected illegal substances which indicate the need for search of the family and/or belongings  3. Encourage verbalization of thoughts and concerns in a socially appropriate manner  4. Utilize positive, consistent limit setting strategies supporting safety of patient, staff and others  5. Encourage participation in the decision making process about the behavioral management agreement  6. If a visitor's behavior poses a threat to safety call refer to organization policy.  7. Initiate consult with , Psychosocial CNS, Spiritual Care as appropriate  Outcome: Progressing     Problem: Anxiety  Goal: Will report anxiety at manageable levels  Description: INTERVENTIONS:  1. Administer medication as ordered  2. Teach and rehearse alternative  coping skills  3. Provide emotional support with 1:1 interaction with staff  Outcome: Progressing     Problem: Sleep Disturbance  Goal: Will exhibit normal sleeping pattern  Description: INTERVENTIONS:  1. Administer medication as ordered  2. Decrease environmental stimuli, including noise, as appropriate  3. Discourage social isolation and naps during the day  Outcome: Progressing     Patient currently denies suicidal ideations, homicidal ideations, and hallucinations. Reports fleeting feelings of helplessness, although he states he typically feels an improvement in his mood while he is here. Endorses feelings of anxiety, states he cannot sit still at time and is observed pacing on the unit throughout the day. Reports he slept very poorly last evening d/t racing thoughts and feeling restless. Patient is attending groups, is medication compliant, and is in control of his behavior.

## 2024-07-25 NOTE — CARE COORDINATION
Pt requested to meet with CLAUDE. CLAUDE met with pt who stated that he is currently on SSI and he is interested in working part time and would like to know if he is able to do this with the SSI income. CLAUDE informed pt that he would need to call the [employer] to determine if he is able to work part time, how much he is able to make and how many hours he is able to work. Pt stated that he wants to work part time to get out of the house and occupy his time. CLAUDE also informed pt if he is concerned about losing his income to look into volunteering to occupy his time. Pt was receptive of this information.

## 2024-07-25 NOTE — PROGRESS NOTES
Patient has been awake throughout the night. In and out of his room and pacing the halls. States \"I guess I just have a lot on my mind\". Emotional support offered. Patient voiced no suicidal ideations at this time. Purposeful rounding continued.

## 2024-07-25 NOTE — PROGRESS NOTES
BEHAVIORAL HEALTH FOLLOW-UP NOTE     7/25/2024     Patient was seen and examined in person, Chart reviewed   Patient's case discussed with staff/team    Chief Complaint: worried, anxious, intrusive thoughts, irritability    Interim History:     Patient is out on the unit, he is anxious, worried, states that he has a lot on his mind, tells me that he did not sleep last night, was up pacing. He has continued to report that he is compliant with his medications on OP basis, however VPA level was less than three indicating that he is not taking as directed. Will continue to monitor. Patient is now consistently taking the medications. He is requiring a lot of reassurance and encouragement.   His thought process is unstable, he has limited ability to abstract.     He has reported that he knows when he is due for his shot, because of increase in symptoms. Patient may benefit from higher dose of PO supplementation week injection is due, or may consider shot every 3 weeks     Appetite:   [] Normal/Unchanged  [] Increased  [x] Decreased      Sleep:       [] Normal/Unchanged  [x] Fair       [] Poor              Energy:    [] Normal/Unchanged  [] Increased  [x] Decreased        SI [] Present  [x] Absent    HI  []Present  [x] Absent     Aggression:  [] yes  [x] no    Patient is [x] able  [] unable to CONTRACT FOR SAFETY     PAST MEDICAL/PSYCHIATRIC HISTORY:   Past Medical History:   Diagnosis Date    ADD (attention deficit disorder with hyperactivity)     Anxiety     Cerebral hemorrhage (HCC)     Cerebral palsy (HCC)     Depression     Dyskinesia     Schizophrenia (HCC)        FAMILY/SOCIAL HISTORY:  Family History   Problem Relation Age of Onset    Mental Illness Mother     High Blood Pressure Father     Other Father     Mental Illness Brother      Social History     Socioeconomic History    Marital status: Single     Spouse name: Not on file    Number of children: 0    Years of education: 13    Highest education level: High  the patient and the patient demonstrated understanding.  The patient was also educated that the outcome of treatment will depend on the medication compliance as directed by the prescribers along with regular follow-up, compliance with the labs and other work-up, as clinically indicated.     Risk Management: Based on the diagnosis and assessment biopsychosocial treatment model was presented to the patient and was given the opportunity to ask any question.  The patient was agreeable to the plan and all the patient's questions were answered to the patient's satisfaction.  I discussed with the patient the risk, benefit, alternative and common side effects for the proposed medication treatment.  The patient is consenting to this treatment.      New Medications started during this admission :    Aristada injection due 7/27/24  Cogentin 1 mg bid  Depakote 500 mg BID  VPA level less than 3   Collateral information: followed by social work   CD evaluation  Encourage patient to attend group and other milieu activities.  Discharge planning discussed with the patient and treatment team.    PSYCHOTHERAPY/COUNSELING:  [x] Therapeutic interview  [x] Supportive  [] CBT  [] Ongoing  [] Other    [x] Patient continues to need, on a daily basis, active treatment furnished directly by or requiring the supervision of inpatient psychiatric personnel      Anticipated Length of stay: 5 - 7 days based on stability      NOTE: This report was transcribed using voice recognition software. Every effort was made to ensure accuracy; however, inadvertent computerized transcription errors may be present.        Electronically signed by ELAINE Briones CNP on 7/25/2024 at 9:43 AM

## 2024-07-25 NOTE — CARE COORDINATION
CLAUDE called [facility] to schedule follow up appointments. CLAUDE scheduled an intake appointment on 8/5 at 9 AM.     [facility]  Address: [address] [place], [place] [address] [id number]   Phone number [phone]  Fax [phone]

## 2024-07-25 NOTE — DISCHARGE INSTR - COC
Continuity of Care Form    Patient Name: Jaden Savage III   :  1995  MRN:  [id number]    Admit date:  2024  Discharge date:  ***    Code Status Order: Full Code   Advance Directives:     Admitting Physician:  Trinity Carrion MD  PCP: Myles Chakraborty DO    Discharging Nurse: ***  Discharging Hospital Unit/Room#: 7304/7304-A  Discharging Unit Phone Number: ***    Emergency Contact:   Extended Emergency Contact Information  Primary Emergency Contact: Joi Savage  Address: [address] of [facility]  Home Phone: [phone]  Mobile Phone: [phone]  Relation: Parent  Secondary Emergency Contact: NixAngie  Home Phone: [phone]  Mobile Phone: [phone]  Relation: Aunt/Uncle    Past Surgical History:  Past Surgical History:   Procedure Laterality Date    FOOT SURGERY      HERNIA REPAIR      TONSILLECTOMY         Immunization History:     There is no immunization history on file for this patient.    Active Problems:  Patient Active Problem List   Diagnosis Code    Mood disorder ([facility]) F39    Cannabis use disorder, severe, dependence ([facility]) F12.20    Tobacco use disorder, continuous F17.209    Severe recurrent major depression with psychotic features ([facility]) F33.3    Depression F32.A    Gender dysphoria in adult F64.0    Suicidal ideation R45.851    Depression, major, single episode F32.9    Bipolar 1 disorder, manic, moderate ([facility]) F31.12    Bipolar 1 disorder, depressed ([facility]) F31.9    Bipolar 1 disorder ([facility]) F31.9       Isolation/Infection:   Isolation            No Isolation          Patient Infection Status       None to display                     Nurse Assessment:  Last Vital Signs: BP (!) 151/77   Pulse 78   Temp 97.9 °F (36.6 °C) (Temporal)   Resp 16   Ht 1.727 m (5' 8\")   Wt 79.4 kg (175 lb)   SpO2 96%   BMI 26.61 kg/m²     Last documented pain score (0-10 scale): Pain Level: 4  Last Weight:   Wt Readings from Last  Score:  Readmission Risk              Risk of Unplanned Readmission:  14           Discharging to Facility/ Agency   Name:   Address:  Phone:  Fax:    Dialysis Facility (if applicable)   Name:  Address:  Dialysis Schedule:  Phone:  Fax:    / signature: {Esignature:[id number]}    PHYSICIAN SECTION    Prognosis: {Prognosis:4363628651}    Condition at Discharge: { Patient Condition:367958439}    Rehab Potential (if transferring to Rehab): {Prognosis:6573535337}    Recommended Labs or Other Treatments After Discharge: ***    Physician Certification: I certify the above information and transfer of Jaden DEWITT Buxton III  is necessary for the continuing treatment of the diagnosis listed and that he requires {Admit to Appropriate Level of Care:87420} for {GREATER/LESS:002842311} 30 days.     Update Admission H&P: {CHP DME Changes in HandP:063315738}    PHYSICIAN SIGNATURE:  {Esignature:208470525}

## 2024-07-25 NOTE — CARE COORDINATION
SW met with pt to discuss follow up appointments. Pt stated that he does not want to return back to [facility] because they do not provide him with the help or support that he needs at this time. Pt stated that he would like a MH provider that is close to his home as he has transportation barriers. Pt is agreeable to go to [facility] and stated that he will be able to ride his bike there. Pt is hopeful that he will be able to be seen for counseling more than 1 time per month. Pt is also interested in getting help getting back to school however he is unsure what he would like to major in at this time.

## 2024-07-26 PROCEDURE — 6370000000 HC RX 637 (ALT 250 FOR IP): Performed by: NURSE PRACTITIONER

## 2024-07-26 PROCEDURE — 6370000000 HC RX 637 (ALT 250 FOR IP): Performed by: PSYCHIATRY & NEUROLOGY

## 2024-07-26 PROCEDURE — 1240000000 HC EMOTIONAL WELLNESS R&B

## 2024-07-26 PROCEDURE — 99232 SBSQ HOSP IP/OBS MODERATE 35: CPT | Performed by: NURSE PRACTITIONER

## 2024-07-26 RX ORDER — TRAZODONE HYDROCHLORIDE 50 MG/1
50 TABLET ORAL NIGHTLY PRN
Status: DISCONTINUED | OUTPATIENT
Start: 2024-07-26 | End: 2024-07-29

## 2024-07-26 RX ADMIN — DIVALPROEX SODIUM 500 MG: 500 TABLET, DELAYED RELEASE ORAL at 21:46

## 2024-07-26 RX ADMIN — Medication 3 MG: at 21:47

## 2024-07-26 RX ADMIN — HYDROXYZINE PAMOATE 50 MG: 50 CAPSULE ORAL at 21:47

## 2024-07-26 RX ADMIN — TRAZODONE HYDROCHLORIDE 50 MG: 50 TABLET ORAL at 21:48

## 2024-07-26 RX ADMIN — BENZTROPINE MESYLATE 1 MG: 1 TABLET ORAL at 21:47

## 2024-07-26 RX ADMIN — DIVALPROEX SODIUM 500 MG: 500 TABLET, DELAYED RELEASE ORAL at 08:39

## 2024-07-26 RX ADMIN — ARIPIPRAZOLE 15 MG: 15 TABLET ORAL at 08:39

## 2024-07-26 RX ADMIN — ACETAMINOPHEN 650 MG: 325 TABLET ORAL at 21:48

## 2024-07-26 RX ADMIN — BENZTROPINE MESYLATE 1 MG: 1 TABLET ORAL at 08:39

## 2024-07-26 RX ADMIN — ACETAMINOPHEN 650 MG: 325 TABLET ORAL at 10:14

## 2024-07-26 ASSESSMENT — PAIN DESCRIPTION - LOCATION
LOCATION: HEAD
LOCATION: HEAD

## 2024-07-26 ASSESSMENT — PAIN SCALES - GENERAL
PAINLEVEL_OUTOF10: 0
PAINLEVEL_OUTOF10: 9
PAINLEVEL_OUTOF10: 7

## 2024-07-26 ASSESSMENT — PAIN DESCRIPTION - DESCRIPTORS
DESCRIPTORS: ACHING
DESCRIPTORS: ACHING;DISCOMFORT;SORE

## 2024-07-26 NOTE — PLAN OF CARE
Patient denies SI/HI/AVH. Endorses  7/10 anxiety and 10/10 depression. Reports racing thoughts are improving. Patient is friendly and cooperative on assessment. Appears helpless and somatic at times. Reports appetite and sleep are adequate. Patient is taking medications without issues and is attending groups. Will continue to monitor and intervene as needed.     Problem: Pain  Goal: Verbalizes/displays adequate comfort level or baseline comfort level  Outcome: Progressing     Problem: Risk for Elopement  Goal: Patient will not exit the unit/facility without proper excort  7/25/2024 2133 by Gracie Ford RN  Outcome: Progressing     Problem: Self Harm/Suicidality  Goal: Will have no self-injury during hospital stay  Description: INTERVENTIONS:  1.  Ensure constant observer at bedside with Q15M safety checks  2.  Maintain a safe environment  3.  Secure patient belongings  4.  Ensure family/visitors adhere to safety recommendations  5.  Ensure safety tray has been added to patient's diet order  6.  Every shift and PRN: Re-assess suicidal risk via Frequent Screener    7/25/2024 2133 by Gracie Ford RN  Outcome: Progressing     Problem: Depression  Goal: Will be euthymic at discharge  Description: INTERVENTIONS:  1. Administer medication as ordered  2. Provide emotional support via 1:1 interaction with staff  3. Encourage involvement in milieu/groups/activities  4. Monitor for social isolation  7/25/2024 2133 by Gracie Ford RN  Outcome: Progressing     Problem: Behavior  Goal: Pt/Family maintain appropriate behavior and adhere to behavioral management agreement, if implemented  Description: INTERVENTIONS:  1. Assess patient/family's coping skills and  non-compliant behavior (including use of illegal substances)  2. Notify security of behavior or suspected illegal substances which indicate the need for search of the family and/or belongings  3. Encourage verbalization of thoughts and concerns in a socially

## 2024-07-26 NOTE — CARE COORDINATION
Pt stopped SW in the hallway and stated that he spoke with someone regarding getting on a computer to print a note he wrote. Pt stated that he feels like it is important for the team to have his note. SW informed pt that the team is aware of this and would have to get approval for him to be on a computer to print a note. Pt was receptive of this information.

## 2024-07-26 NOTE — GROUP NOTE
Group Therapy Note    Date: 7/26/2024    Group Start Time: 1020  Group End Time: 1035  Group Topic: Community Meeting    [facility]    Lety Hernandez CTRS    Group Therapy Note    Attendees: 9    Date: 7/26/2024  Start Time: 1020  End Time:  1035  Number of Participants: 9    Type of Group: Community Meeting    Was updated on expectations of the unit, staffing, and programming.  Patient shared goal for today as \"Be positive and be happy.\"    Status After Intervention:  Improved    Participation Level: Active Listener and Interactive    Participation Quality: Appropriate, Attentive, and Sharing      Speech:  normal      Thought Process/Content: Logical  Linear      Affective Functioning: Congruent      Mood:  Appropriate      Level of consciousness:  Alert and Attentive      Response to Learning: Able to verbalize current knowledge/experience, Able to verbalize/acknowledge new learning, Able to retain information, Capable of insight, Able to change behavior, and Progressing to goal      Endings: None Reported    Modes of Intervention: Education, Support, Socialization, Exploration, Clarifying, and Problem-solving      Discipline Responsible: Psychoeducational Specialist      Signature:  AJE Jones

## 2024-07-26 NOTE — GROUP NOTE
Group Therapy Note    Date: 7/26/2024    Group Start Time: 1035  Group End Time: 1110  Group Topic: Psychoeducation    [facility]    Lety Hernandez CTRS    Group Therapy Note    Attendees: 10    Date: 7/26/2024  Start Time: 1035  End Time:  1110  Number of Participants: 10    Type of Group: Psychoeducation    Name:  Self-Esteem     Patient's Goal:  Identify what is self-esteem and how it affects mental health, ways to improve self-esteem.     Notes:  CTRS led educational group discussion on self-esteem. Encouraged patients to share their experiences. Patient was actively engaged in group discussion.    Status After Intervention:  Improved    Participation Level: Active Listener and Interactive    Participation Quality: Appropriate, Attentive, and Sharing      Speech:  normal      Thought Process/Content: Logical  Linear      Affective Functioning: Congruent      Mood:  Appropriate      Level of consciousness:  Alert and Attentive      Response to Learning: Able to verbalize current knowledge/experience, Able to verbalize/acknowledge new learning, Able to retain information, Capable of insight, Able to change behavior, and Progressing to goal      Endings: None Reported    Modes of Intervention: Education, Support, Socialization, Exploration, Clarifying, and Problem-solving      Discipline Responsible: Psychoeducational Specialist      Signature:  JAE Jones

## 2024-07-26 NOTE — GROUP NOTE
Group Therapy Note    Date: 7/26/2024    Group Start Time: 1400  Group End Time: 1440  Group Topic: Cognitive Skills    [facility]    Carmen Miranda MSW, SHIKHA        Group Therapy Note    Attendees: 8       Patient's Goal:  Pt will be able to identify tips and ways to have healthy relationships through boundaries.     Notes:  Pt participated in group and made connections.     Status After Intervention:  Improved    Participation Level: Active Listener and Interactive    Participation Quality: Appropriate, Attentive, and Sharing      Speech:  normal      Thought Process/Content: Logical  Linear      Affective Functioning: Congruent      Mood: anxious      Level of consciousness:  Alert, Oriented x4, and Attentive      Response to Learning: Able to verbalize current knowledge/experience, Able to verbalize/acknowledge new learning, Able to retain information, and Capable of insight      Endings: None Reported    Modes of Intervention: Education, Support, Socialization, Exploration, Clarifying, and Problem-solving      Discipline Responsible: /Counselor      Signature:  PACHECO Davis LSW

## 2024-07-26 NOTE — PROGRESS NOTES
Spiritual Support Group Note    Number of Participants in Group:     7                   Time: 3 pm    Goal: Relief from isolation and loneliness             Luz Maria Sharing             Self-understanding and gain insight              Acceptance and belonging            Recognize they are not alone                Socialization             Empowerment       Encouragement    Topic:  [x] Spiritual Wellness and Self Care                  [x] Hope                     [] Connecting with Divine/Others        [] Thankfulness and Gratitude               []  Meaningfulness and Purpose               [] Forgiveness               [] Peace               [] Connect to Community      [] Other    Participation Level:   [x] Active Listener   [] Minimal   [] Monopolizing   [] Interactive   [] No Participation   []  Other:     Attention:   [x] Alert   [] Distractible   [] Drowsy   [] Poor   [] Other:    Manner:   [x] Cooperative   [] Suspicious   [] Withdrawn   [] Guarded   [] Irritable   [] Inhospitable   [] Other:     Others Comments from Group:

## 2024-07-26 NOTE — PROGRESS NOTES
Pt approached CTRS after group concluded to speak privately. Pt reported he would like his 6 page suicide note he wrote prior to admission to be printed off his google drive and reviewed by treatment team. CTRS informed pt's nurse,  and .

## 2024-07-26 NOTE — PROGRESS NOTES
BEHAVIORAL HEALTH FOLLOW-UP NOTE     7/26/2024     Patient was seen and examined in person, Chart reviewed   Patient's case discussed with staff/team    Chief Complaint: worried, anxious, intrusive thoughts, irritability    Interim History:     Patient is out on the unit today he is pleasant, however appears anxious.  States that he did not sleep well again and is restless and believes that lack of sleep is causing him problems.  We do discuss the use of trazodone which he is agreeable to try this.  He is taking his medications.  He has been observed out on the unit in the milieu social with peers.  He is denying any suicidal or homicidal ideation intent or plan, he makes good eye contact today.  Remains polite without any behavioral disturbances  His thought process is unstable, he has limited ability to abstract.     He has reported that he knows when he is due for his shot, because of increase in symptoms. Patient may benefit from higher dose of PO supplementation week injection is due, or may consider shot every 3 weeks     Appetite:   [] Normal/Unchanged  [] Increased  [x] Decreased      Sleep:       [] Normal/Unchanged  [] Fair       [x] Poor              Energy:    [] Normal/Unchanged  [] Increased  [x] Decreased        SI [] Present  [x] Absent    HI  []Present  [x] Absent     Aggression:  [] yes  [x] no    Patient is [x] able  [] unable to CONTRACT FOR SAFETY     PAST MEDICAL/PSYCHIATRIC HISTORY:   Past Medical History:   Diagnosis Date    ADD (attention deficit disorder with hyperactivity)     Anxiety     Cerebral hemorrhage (HCC)     Cerebral palsy (HCC)     Depression     Dyskinesia     Schizophrenia (HCC)        FAMILY/SOCIAL HISTORY:  Family History   Problem Relation Age of Onset    Mental Illness Mother     High Blood Pressure Father     Other Father     Mental Illness Brother      Social History     Socioeconomic History    Marital status: Single     Spouse name: Not on file    Number of children: 0  alternatives discussed with the patient and the patient demonstrated understanding.  The patient was also educated that the outcome of treatment will depend on the medication compliance as directed by the prescribers along with regular follow-up, compliance with the labs and other work-up, as clinically indicated.     Risk Management: Based on the diagnosis and assessment biopsychosocial treatment model was presented to the patient and was given the opportunity to ask any question.  The patient was agreeable to the plan and all the patient's questions were answered to the patient's satisfaction.  I discussed with the patient the risk, benefit, alternative and common side effects for the proposed medication treatment.  The patient is consenting to this treatment.      New Medications started during this admission :    Aristada injection due 7/27/24  Trazodone 50 mg nightly as needed  Cogentin 1 mg bid  Depakote 500 mg BID  VPA level less than 3   Collateral information: followed by social work   CD evaluation  Encourage patient to attend group and other milieu activities.  Discharge planning discussed with the patient and treatment team.    PSYCHOTHERAPY/COUNSELING:  [x] Therapeutic interview  [x] Supportive  [] CBT  [] Ongoing  [] Other    [x] Patient continues to need, on a daily basis, active treatment furnished directly by or requiring the supervision of inpatient psychiatric personnel      Anticipated Length of stay: 5 - 7 days based on stability      NOTE: This report was transcribed using voice recognition software. Every effort was made to ensure accuracy; however, inadvertent computerized transcription errors may be present.        Electronically signed by ELAINE Briones CNP on 7/26/2024 at 11:08 AM

## 2024-07-27 PROCEDURE — 99232 SBSQ HOSP IP/OBS MODERATE 35: CPT | Performed by: NURSE PRACTITIONER

## 2024-07-27 PROCEDURE — 1240000000 HC EMOTIONAL WELLNESS R&B

## 2024-07-27 PROCEDURE — 6370000000 HC RX 637 (ALT 250 FOR IP): Performed by: PSYCHIATRY & NEUROLOGY

## 2024-07-27 PROCEDURE — 6370000000 HC RX 637 (ALT 250 FOR IP): Performed by: NURSE PRACTITIONER

## 2024-07-27 RX ADMIN — ARIPIPRAZOLE 15 MG: 15 TABLET ORAL at 10:00

## 2024-07-27 RX ADMIN — BENZTROPINE MESYLATE 1 MG: 1 TABLET ORAL at 10:00

## 2024-07-27 RX ADMIN — ACETAMINOPHEN 650 MG: 325 TABLET ORAL at 14:29

## 2024-07-27 RX ADMIN — DIVALPROEX SODIUM 500 MG: 500 TABLET, DELAYED RELEASE ORAL at 21:38

## 2024-07-27 RX ADMIN — Medication 3 MG: at 21:39

## 2024-07-27 RX ADMIN — HYDROXYZINE PAMOATE 50 MG: 50 CAPSULE ORAL at 21:39

## 2024-07-27 RX ADMIN — BENZTROPINE MESYLATE 1 MG: 1 TABLET ORAL at 21:39

## 2024-07-27 RX ADMIN — DIVALPROEX SODIUM 500 MG: 500 TABLET, DELAYED RELEASE ORAL at 10:00

## 2024-07-27 ASSESSMENT — PAIN SCALES - GENERAL
PAINLEVEL_OUTOF10: 6
PAINLEVEL_OUTOF10: 0
PAINLEVEL_OUTOF10: 0

## 2024-07-27 ASSESSMENT — PAIN DESCRIPTION - DESCRIPTORS: DESCRIPTORS: PRESSURE

## 2024-07-27 ASSESSMENT — PAIN DESCRIPTION - ORIENTATION: ORIENTATION: RIGHT

## 2024-07-27 NOTE — GROUP NOTE
Group Therapy Note    Date: 7/27/2024    Group Start Time: 0800  Group End Time: 0815  Group Topic: Community Meeting    [facility]    Lety Hernandez CTRS    Group Therapy Note    Attendees: 10    Date: 7/27/2024  Start Time: 0800  End Time:  0815  Number of Participants: 10    Type of Group: Community Meeting    Was updated on expectations of the unit, staffing, and programming.  Patient shared goal for today as \"Stay positive.\"    Status After Intervention:  Improved    Participation Level: Active Listener and Interactive    Participation Quality: Appropriate, Attentive, and Sharing      Speech:  normal      Thought Process/Content: Logical  Linear      Affective Functioning: Congruent      Mood:  Appropriate      Level of consciousness:  Alert and Attentive      Response to Learning: Able to verbalize current knowledge/experience, Able to verbalize/acknowledge new learning, Able to retain information, Capable of insight, Able to change behavior, and Progressing to goal      Endings: None Reported    Modes of Intervention: Education, Support, Socialization, Exploration, Clarifying, and Problem-solving      Discipline Responsible: Psychoeducational Specialist      Signature:  JAE Jones

## 2024-07-27 NOTE — PLAN OF CARE
Problem: Behavior  Goal: Pt/Family maintain appropriate behavior and adhere to behavioral management agreement, if implemented  Description: INTERVENTIONS:  1. Assess patient/family's coping skills and  non-compliant behavior (including use of illegal substances)  2. Notify security of behavior or suspected illegal substances which indicate the need for search of the family and/or belongings  3. Encourage verbalization of thoughts and concerns in a socially appropriate manner  4. Utilize positive, consistent limit setting strategies supporting safety of patient, staff and others  5. Encourage participation in the decision making process about the behavioral management agreement  6. If a visitor's behavior poses a threat to safety call refer to organization policy.  7. Initiate consult with , Psychosocial CNS, Spiritual Care as appropriate  7/26/2024 2250 by Jackeline Colon, RN  Outcome: Progressing     Problem: Anxiety  Goal: Will report anxiety at manageable levels  Description: INTERVENTIONS:  1. Administer medication as ordered  2. Teach and rehearse alternative coping skills  3. Provide emotional support with 1:1 interaction with staff  7/26/2024 2250 by Jackeline Colon, RN  Outcome: Progressing     Patient has been out on the unit. Social with peers. Cooperative during assessment. Affect is blunted and mood anxious. Tearful at times. C/o poor sleep d/t \"I have a lot on my mind and I have weird dreams. Not positive ones\". Hopeful Trazodone will be effective. Rates anxiety 8/10 and depression 10/10. Denies suicidal/homicidal ideations and hallucinations. Purposeful rounding continued.

## 2024-07-27 NOTE — PLAN OF CARE
Problem: Pain  Goal: Verbalizes/displays adequate comfort level or baseline comfort level  Outcome: Progressing     Problem: Self Harm/Suicidality  Goal: Will have no self-injury during hospital stay  Description: INTERVENTIONS:  1.  Ensure constant observer at bedside with Q15M safety checks  2.  Maintain a safe environment  3.  Secure patient belongings  4.  Ensure family/visitors adhere to safety recommendations  5.  Ensure safety tray has been added to patient's diet order  6.  Every shift and PRN: Re-assess suicidal risk via Frequent Screener    Outcome: Progressing     Problem: Depression  Goal: Will be euthymic at discharge  Description: INTERVENTIONS:  1. Administer medication as ordered  2. Provide emotional support via 1:1 interaction with staff  3. Encourage involvement in milieu/groups/activities  4. Monitor for social isolation  Outcome: Progressing     Problem: Behavior  Goal: Pt/Family maintain appropriate behavior and adhere to behavioral management agreement, if implemented  Description: INTERVENTIONS:  1. Assess patient/family's coping skills and  non-compliant behavior (including use of illegal substances)  2. Notify security of behavior or suspected illegal substances which indicate the need for search of the family and/or belongings  3. Encourage verbalization of thoughts and concerns in a socially appropriate manner  4. Utilize positive, consistent limit setting strategies supporting safety of patient, staff and others  5. Encourage participation in the decision making process about the behavioral management agreement  6. If a visitor's behavior poses a threat to safety call refer to organization policy.  7. Initiate consult with , Psychosocial CNS, Spiritual Care as appropriate  7/27/2024 1234 by Stephanie Villagomez RN  Outcome: Progressing     Problem: Anxiety  Goal: Will report anxiety at manageable levels  Description: INTERVENTIONS:  1. Administer medication as ordered  2. Teach  and rehearse alternative coping skills  3. Provide emotional support with 1:1 interaction with staff  7/27/2024 1234 by Stephanie Villagomez RN  Outcome: Progressing     Problem: Involuntary Admit  Goal: Will cooperate with staff recommendations and doctor's orders and will demonstrate appropriate behavior  Description: INTERVENTIONS:  1. Treat underlying conditions and offer medication as ordered  2. Educate regarding involuntary admission procedures and rules  3. Contain excessive/inappropriate behavior per unit and hospital policies  Outcome: Progressing    Patient denies SI/HI and hallucinations. Patient reports feeling much better after getting adequate sleep last night. Patient states that he did not realize how much the lack of sleep impacted his mood. Patient is brightened and friendly during conversation. Patient is observed in the day room, attending provided meals and groups. Patient is social and appropriate with peers. Patient is taking prescribed medications without issue. Will continue to offer support and comfort to patient.

## 2024-07-27 NOTE — PROGRESS NOTES
BEHAVIORAL HEALTH FOLLOW-UP NOTE     7/27/2024     Patient was seen and examined in person, Chart reviewed   Patient's case discussed with staff/team    Chief Complaint: worried, anxious, intrusive thoughts, irritability    Interim History:   I saw patient up on the unit he tells me that he is feeling better because he got sleep last night.  He states that he had been trouble sleeping prior to last night.  He tells me that he would like to switch from [facility] to [facility] and asked me how to have his medical records transferred.  I instructed him to work through [facility] to have his records transferred.  He denies suicide ideations intent or plan denies auditory or visual hallucinations tells me that he is overall feeling better he states that trazodone helped his sleep that was his main complaint.  No overt or covert side psychosis no neurovegetative signs symptoms of depression      Appetite:   [] Normal/Unchanged  [] Increased  [x] Decreased      Sleep:       [] Normal/Unchanged  [x] Fair       [] Poor              Energy:    [] Normal/Unchanged  [] Increased  [x] Decreased        SI [] Present  [x] Absent    HI  []Present  [x] Absent     Aggression:  [] yes  [x] no    Patient is [x] able  [] unable to CONTRACT FOR SAFETY     PAST MEDICAL/PSYCHIATRIC HISTORY:   Past Medical History:   Diagnosis Date    ADD (attention deficit disorder with hyperactivity)     Anxiety     Cerebral hemorrhage (HCC)     Cerebral palsy (HCC)     Depression     Dyskinesia     Schizophrenia (HCC)        FAMILY/SOCIAL HISTORY:  Family History   Problem Relation Age of Onset    Mental Illness Mother     High Blood Pressure Father     Other Father     Mental Illness Brother      Social History     Socioeconomic History    Marital status: Single     Spouse name: Not on file    Number of children: 0    Years of education: 13    Highest education level: High school graduate   Occupational History    Occupation: DISABLED     Employer: UNEMPLOYED  better\"  Affect: A little flat but appropriate  thought processes: Linear and goal directed e   thought content: Devoid of any auditory visual hallucinations delusions or other perceptual normalities.  Eyes any suicidal or homicidal ideation intent or plan..  W  Language: able to name objects and repeate phrases  Recent remote memory intact  Cognition:  oriented to person, place, and time   Fund of Knowledge: Vocabulary intact, pt is aware of current events and past history  Attention and Concentration intact  Insight limited judgment poor    ASSESSMENT:   Patient symptoms are:  [] Well controlled  [] Improving  [] Worsening  [x] No change      Diagnosis:   Principal Problem:    Bipolar 1 disorder (HCC)  Resolved Problems:    * No resolved hospital problems. *      LABS:    No results for input(s): \"WBC\", \"HGB\", \"PLT\" in the last 72 hours.    No results for input(s): \"NA\", \"K\", \"CL\", \"CO2\", \"BUN\", \"CREATININE\", \"GLUCOSE\" in the last 72 hours.    No results for input(s): \"BILITOT\", \"ALKPHOS\", \"AST\", \"ALT\" in the last 72 hours.    Lab Results   Component Value Date/Time    LABAMPH NOT DETECTED 06/25/2012 05:48 PM    BARBSCNU NEGATIVE 07/21/2024 10:12 PM    LABBENZ NEGATIVE 07/21/2024 10:12 PM    LABBENZ SEE BELOW 04/28/2014 03:37 PM    CANNAB POSITIVE 06/25/2012 05:48 PM    LABMETH NEGATIVE 07/21/2024 10:12 PM    ETOH <10 07/21/2024 08:53 PM     Lab Results   Component Value Date/Time    TSH 0.885 03/30/2021 02:17 PM     Lab Results   Component Value Date    LITHIUM 0.19 (L) 06/30/2014     Lab Results   Component Value Date    VALPROATE <3 (L) 07/24/2024           Treatment Plan:  The patient's diagnosis, treatment plan, medication management were formulated after patient was seen directly by the attending physician and myself and all relevant documentation was reviewed.     Risk, benefit, side effects, possible outcomes of the medication and alternatives discussed with the patient and the patient demonstrated

## 2024-07-27 NOTE — GROUP NOTE
Date: 7/27/2024  Start Time: 1015  End Time:  1110  Number of Participants: 9    Type of Group: Psychoeducation    Wellness Binder Information  Module Name:  What is Anxiety?    Patient's Goal:  To increase knowledge of what anxiety is and how it affects the body both physically and mentally.  To ID one or two ways to effective manage symptoms of anxiety    Notes:  CTRS discussed what is anxiety and effective coping skills to help manage symptoms of anxiety.  Patient was an active participant in discussion and accepting of handout.  Patient able to ID one or two ways to improve management of anxiety.     Status After Intervention:  Improved    Participation Level: Active Listener and Interactive    Participation Quality: Appropriate and Attentive      Speech:  normal      Thought Process/Content: Logical      Affective Functioning: Congruent      Mood: anxious      Level of consciousness:  Alert and Attentive      Response to Learning: Able to verbalize current knowledge/experience, Able to verbalize/acknowledge new learning, and Progressing to goal      Endings: None Reported    Modes of Intervention: Education, Support, Exploration, and Clarifying      Discipline Responsible: Recreational Therapist      Signature:  JAE Maki

## 2024-07-28 PROCEDURE — 6370000000 HC RX 637 (ALT 250 FOR IP): Performed by: NURSE PRACTITIONER

## 2024-07-28 PROCEDURE — 1240000000 HC EMOTIONAL WELLNESS R&B

## 2024-07-28 PROCEDURE — 99232 SBSQ HOSP IP/OBS MODERATE 35: CPT | Performed by: NURSE PRACTITIONER

## 2024-07-28 PROCEDURE — 6370000000 HC RX 637 (ALT 250 FOR IP): Performed by: PSYCHIATRY & NEUROLOGY

## 2024-07-28 RX ADMIN — HYDROXYZINE PAMOATE 50 MG: 50 CAPSULE ORAL at 22:23

## 2024-07-28 RX ADMIN — HYDROXYZINE PAMOATE 50 MG: 50 CAPSULE ORAL at 13:21

## 2024-07-28 RX ADMIN — ACETAMINOPHEN 650 MG: 325 TABLET ORAL at 18:52

## 2024-07-28 RX ADMIN — TRAZODONE HYDROCHLORIDE 50 MG: 50 TABLET ORAL at 21:03

## 2024-07-28 RX ADMIN — DIVALPROEX SODIUM 500 MG: 500 TABLET, DELAYED RELEASE ORAL at 09:22

## 2024-07-28 RX ADMIN — DIVALPROEX SODIUM 500 MG: 500 TABLET, DELAYED RELEASE ORAL at 21:03

## 2024-07-28 RX ADMIN — ARIPIPRAZOLE 15 MG: 15 TABLET ORAL at 09:22

## 2024-07-28 RX ADMIN — BENZTROPINE MESYLATE 1 MG: 1 TABLET ORAL at 21:03

## 2024-07-28 RX ADMIN — ACETAMINOPHEN 650 MG: 325 TABLET ORAL at 09:22

## 2024-07-28 RX ADMIN — BENZTROPINE MESYLATE 1 MG: 1 TABLET ORAL at 09:22

## 2024-07-28 ASSESSMENT — PAIN DESCRIPTION - DESCRIPTORS
DESCRIPTORS: ACHING
DESCRIPTORS: ACHING

## 2024-07-28 ASSESSMENT — PAIN SCALES - GENERAL
PAINLEVEL_OUTOF10: 0
PAINLEVEL_OUTOF10: 6
PAINLEVEL_OUTOF10: 6

## 2024-07-28 ASSESSMENT — PAIN DESCRIPTION - ORIENTATION: ORIENTATION: RIGHT

## 2024-07-28 ASSESSMENT — PAIN DESCRIPTION - LOCATION
LOCATION: HEAD
LOCATION: HEAD

## 2024-07-28 NOTE — GROUP NOTE
Group Therapy Note    Date: 7/28/2024  Start Time: 0805  End Time:  0825  Number of Participants: 15    Type of Group: Community Meeting    Wellness Binder Information  Module Name:  Community Meeting      Patient's Goal:  Patient will be oriented to the unit including but not limited expectations, staff, programming schedule.  Patient will be able to ID expectations of treatment.     Notes: Patient was a participant in community meeting, and was updated on expectations of the unit, staffing, programming schedule, and acclimated to their environment.    Patient shared goal for today as \"stay positive\"    Status After Intervention:  Improved    Participation Level: Active Listener and Interactive    Participation Quality: Appropriate and Attentive      Speech:  normal      Thought Process/Content: Logical      Affective Functioning: Congruent      Mood:  content      Level of consciousness:  Alert and Attentive      Response to Learning: Able to verbalize current knowledge/experience, Able to verbalize/acknowledge new learning, and Progressing to goal      Endings: None Reported    Modes of Intervention: Exploration, Clarifying, and Problem-solving      Discipline Responsible: Recreational Therapist      Signature:  JAE Maki

## 2024-07-28 NOTE — GROUP NOTE
Date: 7/28/2024  Start Time: 1530  End Time:  1630  Number of Participants: 6    Type of Group: Recreational    Wellness Binder Information  Module Name:  Cinema Therapy    Patient's Goal:  To increase optimism and decrease hopelessness in oneself.  To explore causes of their illness.    Notes:  CTRS facilitated cinema therapy. Patient was an active participant in activity.     Status After Intervention:  Improved    Participation Level: Active Listener and Interactive    Participation Quality: Appropriate and Attentive      Speech:  normal      Thought Process/Content: Logical      Affective Functioning: Congruent      Mood: euthymic      Level of consciousness:  Alert and Attentive      Response to Learning: Able to verbalize current knowledge/experience, Able to verbalize/acknowledge new learning, and Progressing to goal      Endings: None Reported    Modes of Intervention: Socialization, Exploration, and Activity      Discipline Responsible: Recreational Therapist      Signature:  JAE Maki    Group Therapy Note    Attendees: 6

## 2024-07-28 NOTE — PLAN OF CARE
Problem: Pain  Goal: Verbalizes/displays adequate comfort level or baseline comfort level  7/27/2024 2310 by Lilian Mac, RN  Outcome: Progressing     Problem: Risk for Elopement  Goal: Patient will not exit the unit/facility without proper excort  Outcome: Progressing     Problem: Self Harm/Suicidality  Goal: Will have no self-injury during hospital stay  Description: INTERVENTIONS:  1.  Ensure constant observer at bedside with Q15M safety checks  2.  Maintain a safe environment  3.  Secure patient belongings  4.  Ensure family/visitors adhere to safety recommendations  5.  Ensure safety tray has been added to patient's diet order  6.  Every shift and PRN: Re-assess suicidal risk via Frequent Screener    7/27/2024 2310 by Lilian Mac, RN  Outcome: Progressing     Problem: Depression  Goal: Will be euthymic at discharge  Description: INTERVENTIONS:  1. Administer medication as ordered  2. Provide emotional support via 1:1 interaction with staff  3. Encourage involvement in milieu/groups/activities  4. Monitor for social isolation  7/27/2024 2310 by Lilian Mac, RN  Outcome: Progressing

## 2024-07-28 NOTE — PROGRESS NOTES
BEHAVIORAL HEALTH FOLLOW-UP NOTE     7/28/2024     Patient was seen and examined in person, Chart reviewed   Patient's case discussed with staff/team    Chief Complaint: \"I would never do what my brother did.\"  Interim History:   I saw patient this morning upon the unit he tells me that he slept well last night.  He states that trazodone is working well to help him regulate his sleep.  He states before he came into the hospital he was not sleeping at night.  He denies suicidal ideations intent or plan denies auditory or visual hallucinations he is eating well sleeping well and no neurovegetative signs of depression and no overt or covert signs psychosis.  He states that he would never kill himself like his brother did.  He tells me about how he feels bad for his nephews because both her parents committed suicide.  He is out visible in the milieu he is attending groups and social select peers.  He states he feels stable and ready for discharge.  He tells me he is eating well sleeping well and no neurovegetative signs of depression and no overt or covert signs psychosis has been out visible in the milieu attending groups and socializing with peers      Appetite:   [] Normal/Unchanged  [x] Increased  [] Decreased      Sleep:       [x] Normal/Unchanged  [] Fair       [] Poor              Energy:    [] Normal/Unchanged  [] Increased  [x] Decreased        SI [] Present  [x] Absent    HI  []Present  [x] Absent     Aggression:  [] yes  [x] no    Patient is [x] able  [] unable to CONTRACT FOR SAFETY     PAST MEDICAL/PSYCHIATRIC HISTORY:   Past Medical History:   Diagnosis Date    ADD (attention deficit disorder with hyperactivity)     Anxiety     Cerebral hemorrhage (HCC)     Cerebral palsy (HCC)     Depression     Dyskinesia     Schizophrenia (HCC)        FAMILY/SOCIAL HISTORY:  Family History   Problem Relation Age of Onset    Mental Illness Mother     High Blood Pressure Father     Other Father     Mental Illness Brother   formulated after patient was seen directly by the attending physician and myself and all relevant documentation was reviewed.     Risk, benefit, side effects, possible outcomes of the medication and alternatives discussed with the patient and the patient demonstrated understanding.  The patient was also educated that the outcome of treatment will depend on the medication compliance as directed by the prescribers along with regular follow-up, compliance with the labs and other work-up, as clinically indicated.     Risk Management: Based on the diagnosis and assessment biopsychosocial treatment model was presented to the patient and was given the opportunity to ask any question.  The patient was agreeable to the plan and all the patient's questions were answered to the patient's satisfaction.  I discussed with the patient the risk, benefit, alternative and common side effects for the proposed medication treatment.  The patient is consenting to this treatment.      New Medications started during this admission :    Aristada injection due 7/27/24  Trazodone 50 mg nightly as needed  Cogentin 1 mg bid  Depakote 500 mg BID  VPA level less than 3   Collateral information: followed by social work   CD evaluation  Encourage patient to attend group and other milieu activities.  Discharge planning discussed with the patient and treatment team.    PSYCHOTHERAPY/COUNSELING:  [x] Therapeutic interview  [x] Supportive  [] CBT  [] Ongoing  [] Other    [x] Patient continues to need, on a daily basis, active treatment furnished directly by or requiring the supervision of inpatient psychiatric personnel      Anticipated Length of stay: 5 - 7 days based on stability      NOTE: This report was transcribed using voice recognition software. Every effort was made to ensure accuracy; however, inadvertent computerized transcription errors may be present.        Electronically signed by ELAINE Madsen CNP on 7/28/2024 at 3:16 PM

## 2024-07-28 NOTE — GROUP NOTE
Date: 7/28/2024  Start Time: 1130    Number of Participants: 11    Type of Group: Recreational    Wellness Binder Information  Module Name:  Pre-meal Activity- 2024 [employer]     Patient's Goal:  To increase socialization amongst peers.  To enhance potential new leisure interest    Notes:  CTRS provided patient a pre-meal activity of [employer].  Patient was an active participant in activity.      Status After Intervention:  Improved    Participation Level: Active Listener and Interactive    Participation Quality: Appropriate and Attentive      Speech:  normal      Thought Process/Content: Logical      Affective Functioning: Congruent      Mood: euthymic      Level of consciousness:  Alert and Attentive      Response to Learning: Able to verbalize current knowledge/experience, Able to verbalize/acknowledge new learning, and Progressing to goal      Endings: None Reported    Modes of Intervention: Socialization, Exploration, and Activity      Discipline Responsible: Recreational Therapist      Signature:  JAE Maki    Group Therapy Note    Attendees: 11

## 2024-07-28 NOTE — PROGRESS NOTES
[facility]  Weekly Interdisciplinary Treatment Plan Note     Review Date & Time: 7/28/2024 0900    Admission Type:   Admission Type: Involuntary    Reason for admission:  Reason for Admission: \"feeling suicidal and angry\"    Estimated Length of Stay :  5-7 days  Estimated Discharge Date Update: to be determined by physician    PATIENT STRENGTHS:  Patient Strengths:   Patient Strengths and Limitations:Limitations: Perceives need for assistance with self-care, External locus of control, Lacks leisure interests, Difficult relationships / poor social skills, Demonstrates discomfort with /lack of social skills, Multiple barriers to leisure interests  Addictive Behavior:Addictive Behavior  In the Past 3 Months, Have You Felt or Has Someone Told You That You Have a Problem With  : None  Medical Problems:   Past Medical History:   Diagnosis Date    ADD (attention deficit disorder with hyperactivity)     Anxiety     Cerebral hemorrhage (HCC)     Cerebral palsy (HCC)     Depression     Dyskinesia     Schizophrenia (HCC)        Risk:  Fall Risk   Jamison Scale Jamison Scale Score: 22  BVC      Change in scores no. Changes to plan of Care no    Status EXAM:   Mental Status and Behavioral Exam  Normal: No (resting quietly in bed with eyes closed. Unable to assess.)  Level of Assistance: Independent/Self  Facial Expression: Brightened, Flat  Affect: Blunt  Level of Consciousness: Alert  Frequency of Checks: 4 times per hour, close  Mood:Normal: No  Mood: Anxious, Helpless  Motor Activity:Normal: Yes  Motor Activity: Increased  Eye Contact: Good  Observed Behavior: Cooperative, Friendly, Preoccupied  Sexual Misconduct History: Current - no  Preception: [place] to time, [place] to person, [place] to place, [place] to situation  Attention:Normal: No  Attention: Distractible  Thought Processes: Circumstantial  Thought Content:Normal: No  Thought Content: Preoccupations  Depression Symptoms: Feelings of helplessness,  Feelings of hopelessess, Impaired concentration  Anxiety Symptoms: Generalized  Breana Symptoms: No problems reported or observed.  Hallucinations: None  Delusions: Yes  Delusions: Somatic  Memory:Normal: No  Memory: Poor recent  Insight and Judgment: No  Insight and Judgment: Poor judgment, Poor insight    Daily Assessment Last Entry:   Daily Sleep (WDL): Within Defined Limits            Daily Nutrition (WDL): Within Defined Limits  Level of Assistance: Independent/Self    Patient Monitoring:  Frequency of Checks: 4 times per hour, close    Psychiatric Symptoms:   Depression Symptoms  Depression Symptoms: Feelings of helplessness, Feelings of hopelessess, Impaired concentration  Anxiety Symptoms  Anxiety Symptoms: Generalized  Breana Symptoms  Breana Symptoms: No problems reported or observed.          Suicide Risk CSSR-S:  1) Within the past month, have you wished you were dead or wished you could go to sleep and not wake up? : Yes  2) Have you actually had any thoughts of killing yourself? : Yes  3) Have you been thinking about how you might kill yourself? : Yes  5) Have you started to work out or worked out the details of how to kill yourself? Do you intend to carry out this plan? : Yes  6) Have you ever done anything, started to do anything, or prepared to do anything to end your life?: Yes  Change in Result no Change in Plan of care no    EDUCATION:   Learner Progress Toward Treatment Goals: Reviewed results and recommendations of this team, Reviewed group plan and strategies, Reviewed signs, symptoms and risk of self harm and violent behavior, Reviewed goals and plan of care    Method: individual education, small group, verbal education    Outcome: verbalized understanding, but needs reinforcement to obtain goals    PATIENT GOALS:   short term: none at this time  Long term: none at this time     PLAN/TREATMENT RECOMMENDATIONS UPDATE:   Continue with group therapies, education of coping skills   Continue to

## 2024-07-28 NOTE — GROUP NOTE
Date: 7/28/2024  Start Time: 1015  End Time:  1100  Number of Participants: 11    Type of Group: Psychoeducation    Wellness Binder Information  Module Name:  Serenity Prayer and Acceptance    Patient's Goal:  Patient will be able to establish concept of acceptance into one's daily life.  Patient will be able to ID one or two ways acceptance can help improve overall well being.     Notes:  CTRS discussed the serenity prayer and the meaning behind the prayer.  CTRS then discussed acceptance and what is in our control and not in our control.  Patient was an active participant in discussion and was accepting of handout. Patient exhibited a receptive behavior to the information provided.     Status After Intervention:  Improved    Participation Level: Active Listener and Interactive    Participation Quality: Appropriate and Attentive      Speech:  normal      Thought Process/Content: Logical      Affective Functioning: Congruent      Mood: euthymic      Level of consciousness:  Alert and Attentive      Response to Learning: Able to verbalize current knowledge/experience, Able to verbalize/acknowledge new learning, and Progressing to goal      Endings: None Reported    Modes of Intervention: Education, Exploration, Clarifying, and Problem-solving      Discipline Responsible: Recreational Therapist      Signature:  JEA Maki

## 2024-07-28 NOTE — PLAN OF CARE
Problem: Risk for Elopement  Goal: Patient will not exit the unit/facility without proper excort  7/28/2024 1042 by Stephanie Villagomez RN  Outcome: Progressing     Problem: Self Harm/Suicidality  Goal: Will have no self-injury during hospital stay  Description: INTERVENTIONS:  1.  Ensure constant observer at bedside with Q15M safety checks  2.  Maintain a safe environment  3.  Secure patient belongings  4.  Ensure family/visitors adhere to safety recommendations  5.  Ensure safety tray has been added to patient's diet order  6.  Every shift and PRN: Re-assess suicidal risk via Frequent Screener    7/28/2024 1042 by Stephanie Villagomez RN  Outcome: Progressing     Problem: Depression  Goal: Will be euthymic at discharge  Description: INTERVENTIONS:  1. Administer medication as ordered  2. Provide emotional support via 1:1 interaction with staff  3. Encourage involvement in milieu/groups/activities  4. Monitor for social isolation  7/28/2024 1042 by Stephanie Villagomez RN  Outcome: Progressing     Problem: Behavior  Goal: Pt/Family maintain appropriate behavior and adhere to behavioral management agreement, if implemented  Description: INTERVENTIONS:  1. Assess patient/family's coping skills and  non-compliant behavior (including use of illegal substances)  2. Notify security of behavior or suspected illegal substances which indicate the need for search of the family and/or belongings  3. Encourage verbalization of thoughts and concerns in a socially appropriate manner  4. Utilize positive, consistent limit setting strategies supporting safety of patient, staff and others  5. Encourage participation in the decision making process about the behavioral management agreement  6. If a visitor's behavior poses a threat to safety call refer to organization policy.  7. Initiate consult with , Psychosocial CNS, Spiritual Care as appropriate  Outcome: Progressing     Problem: Anxiety  Goal: Will report anxiety at manageable

## 2024-07-28 NOTE — BH NOTE
Patient denies suicidal ideation, homicidal ideations and AVH. Reports anxiety a 5/10 and anxiety a 7/10.  Presents flat, calm and cooperative during assessment. Patient brightens with conversation. States he is feeling better at this time.  Patient is out on the unit and is social with peers.  Medications taken without issue.  No complaints or concerns verbalized at this time.  No unit problems reported.  Will continue to observe and support.

## 2024-07-28 NOTE — GROUP NOTE
Group Therapy Note    Date: 7/28/2024    Group Start Time: 1430  Group End Time: 1500  Group Topic: Cognitive Skills    [facility]    Beatrice Gonsales MSW, LSW        Group Therapy Note    Attendees: 5       Patient's Goal:  Pt will be able to verbalize understanding regarding the meaning behind the fight-or-flight response.    Notes:  Pt made connections and participated in group.    Status After Intervention:  Unchanged    Participation Level: Active Listener, Interactive, and Monopolizing    Participation Quality: Attentive, Sharing, and Intrusive      Speech:  pressured and loud      Thought Process/Content: Flight of ideas      Affective Functioning: Exaggerated      Mood: euthymic      Level of consciousness:  Alert, Oriented x4, and Attentive      Response to Learning: Able to verbalize current knowledge/experience, Able to verbalize/acknowledge new learning, Able to retain information, and Capable of insight      Endings: None Reported    Modes of Intervention: Education, Support, Socialization, Exploration, and Clarifying      Discipline Responsible: /Counselor      Signature:  PACHECO Edwards LSW

## 2024-07-29 PROCEDURE — 6370000000 HC RX 637 (ALT 250 FOR IP): Performed by: NURSE PRACTITIONER

## 2024-07-29 PROCEDURE — 1240000000 HC EMOTIONAL WELLNESS R&B

## 2024-07-29 PROCEDURE — 6370000000 HC RX 637 (ALT 250 FOR IP): Performed by: PSYCHIATRY & NEUROLOGY

## 2024-07-29 PROCEDURE — 99232 SBSQ HOSP IP/OBS MODERATE 35: CPT | Performed by: NURSE PRACTITIONER

## 2024-07-29 RX ORDER — TRAZODONE HYDROCHLORIDE 50 MG/1
100 TABLET ORAL NIGHTLY
Status: DISCONTINUED | OUTPATIENT
Start: 2024-07-29 | End: 2024-07-29

## 2024-07-29 RX ORDER — LANOLIN ALCOHOL/MO/W.PET/CERES
3 CREAM (GRAM) TOPICAL NIGHTLY
Status: DISCONTINUED | OUTPATIENT
Start: 2024-07-29 | End: 2024-07-30 | Stop reason: HOSPADM

## 2024-07-29 RX ORDER — TRAZODONE HYDROCHLORIDE 150 MG/1
75 TABLET ORAL NIGHTLY
Status: DISCONTINUED | OUTPATIENT
Start: 2024-07-29 | End: 2024-07-30 | Stop reason: HOSPADM

## 2024-07-29 RX ADMIN — DIVALPROEX SODIUM 500 MG: 500 TABLET, DELAYED RELEASE ORAL at 21:17

## 2024-07-29 RX ADMIN — BENZTROPINE MESYLATE 1 MG: 1 TABLET ORAL at 09:02

## 2024-07-29 RX ADMIN — BENZTROPINE MESYLATE 1 MG: 1 TABLET ORAL at 21:17

## 2024-07-29 RX ADMIN — HYDROXYZINE PAMOATE 50 MG: 50 CAPSULE ORAL at 17:13

## 2024-07-29 RX ADMIN — ARIPIPRAZOLE 15 MG: 15 TABLET ORAL at 09:02

## 2024-07-29 RX ADMIN — Medication 3 MG: at 21:17

## 2024-07-29 RX ADMIN — DIVALPROEX SODIUM 500 MG: 500 TABLET, DELAYED RELEASE ORAL at 09:02

## 2024-07-29 RX ADMIN — ACETAMINOPHEN 650 MG: 325 TABLET ORAL at 16:02

## 2024-07-29 RX ADMIN — TRAZODONE HYDROCHLORIDE 75 MG: 150 TABLET ORAL at 21:16

## 2024-07-29 ASSESSMENT — PAIN DESCRIPTION - DESCRIPTORS: DESCRIPTORS: ACHING;DISCOMFORT;SORE

## 2024-07-29 ASSESSMENT — PAIN DESCRIPTION - LOCATION: LOCATION: HEAD;NECK

## 2024-07-29 ASSESSMENT — PAIN SCALES - GENERAL
PAINLEVEL_OUTOF10: 0
PAINLEVEL_OUTOF10: 10

## 2024-07-29 NOTE — PROGRESS NOTES
BEHAVIORAL HEALTH FOLLOW-UP NOTE     7/29/2024     Patient was seen and examined in person, Chart reviewed   Patient's case discussed with staff/team    Chief Complaint: \"I am sleeping good.\"  Interim History:   Patient seen today out on the unit.  Patient states that he slept well last night however staff indicates he only slept 3.5 hours.  Per patient he came to the hospital due to poor sleep.  He denies suicidal ideations intent or plan denies auditory or visual hallucinations he has been out social peers attending groups socialize and appears he understands his medication be adjusted prior to discharge order to ensure that he is sleeping well at night.  Has been attending group social with select peers.  Appreciate of the help that he is receiving here.    Appetite:   [] Normal/Unchanged  [x] Increased  [] Decreased      Sleep:       [x] Normal/Unchanged  [] Fair       [] Poor              Energy:    [] Normal/Unchanged  [] Increased  [x] Decreased        SI [] Present  [x] Absent    HI  []Present  [x] Absent     Aggression:  [] yes  [x] no    Patient is [x] able  [] unable to CONTRACT FOR SAFETY     PAST MEDICAL/PSYCHIATRIC HISTORY:   Past Medical History:   Diagnosis Date    ADD (attention deficit disorder with hyperactivity)     Anxiety     Cerebral hemorrhage (HCC)     Cerebral palsy (HCC)     Depression     Dyskinesia     Schizophrenia (HCC)        FAMILY/SOCIAL HISTORY:  Family History   Problem Relation Age of Onset    Mental Illness Mother     High Blood Pressure Father     Other Father     Mental Illness Brother      Social History     Socioeconomic History    Marital status: Single     Spouse name: Not on file    Number of children: 0    Years of education: 13    Highest education level: High school graduate   Occupational History    Occupation: DISABLED     Employer: UNEMPLOYED   Tobacco Use    Smoking status: Every Day     Current packs/day: 0.50     Average packs/day: 0.5 packs/day for 31.1 years  APRN - CNP    divalproex (DEPAKOTE) DR tablet 500 mg, 500 mg, Oral, 2 times per day, Chandni Garcia APRN - CNP, 500 mg at 07/29/24 0902    ARIPiprazole (ABILIFY) tablet 15 mg, 15 mg, Oral, Daily, Chandni Garcia APRN - CNP, 15 mg at 07/29/24 0902    benztropine (COGENTIN) tablet 1 mg, 1 mg, Oral, BID, Chandni Garcia APRN - CNP, 1 mg at 07/29/24 0902    ARIPiprazole lauroxil (ARISTADA) injection 882 mg, 882 mg, IntraMUSCular, Q28 Days, Chandni Garcia APRN - CNP, 882 mg at 07/25/24 1119    acetaminophen (TYLENOL) tablet 650 mg, 650 mg, Oral, Q6H PRN, Trinity Carrion MD, 650 mg at 07/28/24 1852    magnesium hydroxide (MILK OF MAGNESIA) 400 MG/5ML suspension 30 mL, 30 mL, Oral, Daily PRN, Trinity Carrion MD    nicotine (NICODERM CQ) 21 MG/24HR 1 patch, 1 patch, TransDERmal, Daily, Trinity Carrion MD, 1 patch at 07/28/24 0924    aluminum & magnesium hydroxide-simethicone (MAALOX) 200-200-20 MG/5ML suspension 30 mL, 30 mL, Oral, PRN, Trinity Carrion MD    hydrOXYzine pamoate (VISTARIL) capsule 50 mg, 50 mg, Oral, TID PRN, Trinity Carrion MD, 50 mg at 07/28/24 2223    haloperidol (HALDOL) tablet 5 mg, 5 mg, Oral, Q6H PRN **OR** haloperidol lactate (HALDOL) injection 5 mg, 5 mg, IntraMUSCular, Q6H PRN, Trinity Carrion MD      Examination:  /62   Pulse 69   Temp 97.3 °F (36.3 °C) (Temporal)   Resp 18   Ht 1.727 m (5' 8\")   Wt 79.4 kg (175 lb)   SpO2 98%   BMI 26.61 kg/m²   Gait - steady  Medication side effects(SE): none reported     Mental Status Examination:    Level of consciousness:  within normal limits   Appearance:  fair grooming and fair hygiene  Behavior/Motor:  no abnormalities noted  Attitude toward examiner:  cooperative  Speech:  spontaneous, normal rate and normal volume  Mood: \" I am feeling better\"  Affect: A little flat but appropriate  thought processes: Linear and goal directed e   thought content: Devoid of any auditory visual hallucinations delusions or other

## 2024-07-29 NOTE — BH NOTE
Patient is in bed, and appears to be sleeping. Patient does not appear to be in any respiratory distress at this time. Fall and standard precautions reviewed and implemented. Will continue to monitor patient every 15 minute rounds to ensure patient safety.

## 2024-07-29 NOTE — PLAN OF CARE
Problem: Pain  Goal: Verbalizes/displays adequate comfort level or baseline comfort level  7/29/2024 1616 by Araseli Loyd RN  Outcome: Progressing     Problem: Risk for Elopement  Goal: Patient will not exit the unit/facility without proper excort  7/29/2024 1616 by Araseli Loyd RN  Outcome: Progressing     Problem: Self Harm/Suicidality  Goal: Will have no self-injury during hospital stay  Description: INTERVENTIONS:  1.  Ensure constant observer at bedside with Q15M safety checks  2.  Maintain a safe environment  3.  Secure patient belongings  4.  Ensure family/visitors adhere to safety recommendations  5.  Ensure safety tray has been added to patient's diet order  6.  Every shift and PRN: Re-assess suicidal risk via Frequent Screener    7/29/2024 1616 by Araseli Loyd RN  Outcome: Progressing     Problem: Depression  Goal: Will be euthymic at discharge  Description: INTERVENTIONS:  1. Administer medication as ordered  2. Provide emotional support via 1:1 interaction with staff  3. Encourage involvement in milieu/groups/activities  4. Monitor for social isolation  7/29/2024 1616 by Araseli Loyd RN  Outcome: Progressing     Problem: Behavior  Goal: Pt/Family maintain appropriate behavior and adhere to behavioral management agreement, if implemented  Description: INTERVENTIONS:  1. Assess patient/family's coping skills and  non-compliant behavior (including use of illegal substances)  2. Notify security of behavior or suspected illegal substances which indicate the need for search of the family and/or belongings  3. Encourage verbalization of thoughts and concerns in a socially appropriate manner  4. Utilize positive, consistent limit setting strategies supporting safety of patient, staff and others  5. Encourage participation in the decision making process about the behavioral management agreement  6. If a visitor's behavior poses a threat to safety call refer to organization policy.  7.

## 2024-07-29 NOTE — GROUP NOTE
Shared goal for the day as to take care of my well being.                                                                       Group Therapy Note    Date: 7/29/2024    Group Start Time: 0900  Group End Time: 0925  Group Topic: Community Meeting    [facility]  1    Lisa Turpin CTRS    Type of Group: Community Meeting      Patient's Goal:  Patient will be able to id staffing assignments, expectations of patients, and general information re: floor rules. Will be prompted to share goal for the day.     Notes:  Patient appeared to be an active listener, taking in information presented and was prompted to share goal for the day.    Status After Intervention:  Improved    Participation Level: Active Listener and Interactive    Participation Quality: Appropriate and Attentive      Speech:  normal      Thought Process/Content: Logical      Affective Functioning: Congruent      Mood: euthymic      Level of consciousness:  Alert, Oriented x4, and Attentive      Response to Learning: Able to verbalize/acknowledge new learning      Endings: None Reported    Modes of Intervention: Support, Socialization, and Clarifying      Discipline Responsible: Psychoeducational Specialist      Signature:  JAE Gutiérrez

## 2024-07-29 NOTE — PLAN OF CARE
Problem: Pain  Goal: Verbalizes/displays adequate comfort level or baseline comfort level  Outcome: Progressing     Problem: Pain  Goal: Verbalizes/displays adequate comfort level or baseline comfort level  Outcome: Progressing     Problem: Risk for Elopement  Goal: Patient will not exit the unit/facility without proper excort  Outcome: Progressing  Flowsheets (Taken 7/28/2024 2103)  Nursing Interventions for Elopement Risk: Make sure patient has all necessary personal care items     Problem: Risk for Elopement  Goal: Patient will not exit the unit/facility without proper excort  Outcome: Progressing  Flowsheets (Taken 7/28/2024 2103)  Nursing Interventions for Elopement Risk: Make sure patient has all necessary personal care items     Problem: Self Harm/Suicidality  Goal: Will have no self-injury during hospital stay  Description: INTERVENTIONS:  1.  Ensure constant observer at bedside with Q15M safety checks  2.  Maintain a safe environment  3.  Secure patient belongings  4.  Ensure family/visitors adhere to safety recommendations  5.  Ensure safety tray has been added to patient's diet order  6.  Every shift and PRN: Re-assess suicidal risk via Frequent Screener    Outcome: Progressing  Flowsheets (Taken 7/28/2024 2103)  Will have no self-injury during hospital stay:   Ensure constant observer at bedside with Q15M safety checks   Maintain a safe environment   Ensure family/visitors adhere to safety recommendations     Problem: Self Harm/Suicidality  Goal: Will have no self-injury during hospital stay  Description: INTERVENTIONS:  1.  Ensure constant observer at bedside with Q15M safety checks  2.  Maintain a safe environment  3.  Secure patient belongings  4.  Ensure family/visitors adhere to safety recommendations  5.  Ensure safety tray has been added to patient's diet order  6.  Every shift and PRN: Re-assess suicidal risk via Frequent Screener    Outcome: Progressing  Flowsheets (Taken 7/28/2024 2103)  Will  Administer medication as ordered   Teach and rehearse alternative coping skills   Provide emotional support with 1:1 interaction with staff     Problem: Sleep Disturbance  Goal: Will exhibit normal sleeping pattern  Description: INTERVENTIONS:  1. Administer medication as ordered  2. Decrease environmental stimuli, including noise, as appropriate  3. Discourage social isolation and naps during the day  Outcome: Progressing     Problem: Sleep Disturbance  Goal: Will exhibit normal sleeping pattern  Description: INTERVENTIONS:  1. Administer medication as ordered  2. Decrease environmental stimuli, including noise, as appropriate  3. Discourage social isolation and naps during the day  Outcome: Progressing     Problem: Involuntary Admit  Goal: Will cooperate with staff recommendations and doctor's orders and will demonstrate appropriate behavior  Description: INTERVENTIONS:  1. Treat underlying conditions and offer medication as ordered  2. Educate regarding involuntary admission procedures and rules  3. Contain excessive/inappropriate behavior per unit and hospital policies  Outcome: Progressing  Flowsheets (Taken 7/28/2024 2103)  Will cooperate with staff recommendations and doctor's orders and will demonstrate appropriate behavior:   Treat underlying conditions and offer medication as ordered   Educate regarding involuntary admission procedures and rules   Contain excessive/inappropriate behavior per unit and hospital policies     Problem: Self Care Deficit  Goal: Return ADL status to a safe level of function  Description: INTERVENTIONS:  1. Administer medication as ordered  2. Assess ADL deficits and provide assistive devices as needed  3. Obtain PT/OT consults as needed  4. Assist and instruct patient to increase activity and self care as tolerated  Outcome: Progressing     Problem: Self Care Deficit  Goal: Return ADL status to a safe level of function  Description: INTERVENTIONS:  1. Administer medication as

## 2024-07-29 NOTE — GROUP NOTE
Group Therapy Note    Date: 7/29/2024    Group Start Time: 1400  Group End Time: 1430  Group Topic: Cognitive Skills    [facility] 2    Carmen Miranda MSW, LSW        Group Therapy Note    Attendees: 10       Patient's Goal:  Pt will be able to make decisions and identify tips to help with over thinking and intrusive thoughts.      Notes:  pt participated in group and made connections.    Status After Intervention:  Improved    Participation Level: Active Listener and Interactive    Participation Quality: Appropriate, Attentive, Sharing, and Supportive      Speech:  normal      Thought Process/Content: Logical  Linear      Affective Functioning: Congruent      Mood: anxious      Level of consciousness:  Alert, Oriented x4, and Attentive      Response to Learning: Able to verbalize current knowledge/experience, Able to verbalize/acknowledge new learning, Able to retain information, and Capable of insight      Endings: None Reported    Modes of Intervention: Education, Support, Socialization, Exploration, Clarifying, and Problem-solving      Discipline Responsible: /Counselor      Signature:  PACHECO Davis LSW

## 2024-07-29 NOTE — GROUP NOTE
Group Therapy Note    Date: 7/29/2024    Group Start Time: 1045  Group End Time: 1135  Group Topic: Psychoeducation    [facility] 1    Lisa Turpin, JAE    Group Name: Dimensions of wellness     Patient's Goal:  pt will be able to id 8 dimensions of wellness and activities he/she can complete to achieve to improve his/her own wellness.     Notes:  Pleasant and sharing in group, able to share when prompted, accepting of handout.      Status After Intervention:  Improved    Participation Level: Active Listener and Interactive    Participation Quality: Appropriate, Attentive, and Sharing      Speech:  normal      Thought Process/Content: Logical      Affective Functioning: Congruent      Mood: euthymic      Level of consciousness:  Alert, Oriented x4, and Attentive      Response to Learning: Able to verbalize/acknowledge new learning, Able to retain information, and Progressing to goal      Endings: None Reported    Modes of Intervention: Education, Support, and Clarifying      Discipline Responsible: Psychoeducational Specialist      Signature:  JAE Gutiérrez

## 2024-07-29 NOTE — BH NOTE
Patient alert and oriented x 4. Patient has a flat, blunt affect, however, brightens with conversation. Patient appears anxious. Patient appears friendly and cooperative. Patient appears labile. Patient is exhibiting flight of ideas. Patient denies any suicidal ideations, homicidal ideations, auditory and visual hallucinations. Patient denies any depression at this time. Patient reports ,\"just alittle anxiety,\" at this time.  Patient denies any pain at this time. Patient is in day room socializing and watching television with other patients. Patient does not appear in any distress at this time. Fall and standard precautions reviewed and implemented. Will continue to monitor patient every 15 minute rounds to ensure patient safety.

## 2024-07-30 VITALS
DIASTOLIC BLOOD PRESSURE: 80 MMHG | OXYGEN SATURATION: 98 % | BODY MASS INDEX: 26.52 KG/M2 | SYSTOLIC BLOOD PRESSURE: 121 MMHG | RESPIRATION RATE: 18 BRPM | HEIGHT: 68 IN | HEART RATE: 59 BPM | WEIGHT: 175 LBS | TEMPERATURE: 98.4 F

## 2024-07-30 LAB
DATE LAST DOSE: NORMAL
TME LAST DOSE: NORMAL H
VALPROATE SERPL-MCNC: 73 UG/ML (ref 50–100)
VANCOMYCIN DOSE: NORMAL MG

## 2024-07-30 PROCEDURE — 36415 COLL VENOUS BLD VENIPUNCTURE: CPT

## 2024-07-30 PROCEDURE — 6370000000 HC RX 637 (ALT 250 FOR IP): Performed by: NURSE PRACTITIONER

## 2024-07-30 PROCEDURE — 99239 HOSP IP/OBS DSCHRG MGMT >30: CPT | Performed by: NURSE PRACTITIONER

## 2024-07-30 PROCEDURE — 80164 ASSAY DIPROPYLACETIC ACD TOT: CPT

## 2024-07-30 RX ORDER — HYDROXYZINE PAMOATE 50 MG/1
50 CAPSULE ORAL 3 TIMES DAILY PRN
Qty: 42 CAPSULE | Refills: 0 | Status: SHIPPED | OUTPATIENT
Start: 2024-07-30 | End: 2024-08-13

## 2024-07-30 RX ORDER — ARIPIPRAZOLE 15 MG/1
15 TABLET ORAL DAILY
Qty: 30 TABLET | Refills: 0 | Status: SHIPPED | OUTPATIENT
Start: 2024-07-30 | End: 2024-08-29

## 2024-07-30 RX ORDER — TRAZODONE HYDROCHLORIDE 150 MG/1
75 TABLET ORAL NIGHTLY
Qty: 15 TABLET | Refills: 0 | Status: SHIPPED | OUTPATIENT
Start: 2024-07-30 | End: 2024-08-29

## 2024-07-30 RX ORDER — DIVALPROEX SODIUM 500 MG/1
500 TABLET, DELAYED RELEASE ORAL EVERY 12 HOURS SCHEDULED
Qty: 60 TABLET | Refills: 0 | Status: SHIPPED | OUTPATIENT
Start: 2024-07-30 | End: 2024-08-29

## 2024-07-30 RX ORDER — BENZTROPINE MESYLATE 1 MG/1
1 TABLET ORAL 2 TIMES DAILY
Qty: 60 TABLET | Refills: 0 | Status: SHIPPED | OUTPATIENT
Start: 2024-07-30 | End: 2024-08-29

## 2024-07-30 RX ADMIN — DIVALPROEX SODIUM 500 MG: 500 TABLET, DELAYED RELEASE ORAL at 08:55

## 2024-07-30 RX ADMIN — BENZTROPINE MESYLATE 1 MG: 1 TABLET ORAL at 08:55

## 2024-07-30 RX ADMIN — ARIPIPRAZOLE 15 MG: 15 TABLET ORAL at 08:54

## 2024-07-30 ASSESSMENT — PAIN SCALES - GENERAL: PAINLEVEL_OUTOF10: 9

## 2024-07-30 ASSESSMENT — PAIN DESCRIPTION - LOCATION: LOCATION: HEAD;NECK

## 2024-07-30 NOTE — BH NOTE
Patient denies suicidal ideation, homicidal ideations and AVH. Reports anxiety and depression a 0/10.  Presents bright, friendly,  calm and cooperative during assessment.  Patient is out on the unit and is very social with peers.  Medications taken without issue.  No complaints or concerns verbalized at this time.  No unit problems reported.  Will continue to observe and support.

## 2024-07-30 NOTE — GROUP NOTE
Date: 7/30/2024  Start Time: 1015  End Time:  1055  Number of Participants: 8    Type of Group: Psychoeducation    Wellness Binder Information  Module Name:  10 tips for boosting your Self-Esteem    Patient's Goal:  Patient will be able to ID ways to improve self-image/self-esteem.  Patient will demonstrate knowledge of how external influences can affect your self-esteem    Notes:  CTRS discussed what external influences can affect one's self-esteem/self-image.  CTRS provided tips for boosting one's self-esteem.  Patient was able to ID at least one way to improve their self-image/self-esteem, and was an active participant in discussion.  Patient was accepting of handout, and receptive to the information provided.     Status After Intervention:  Improved    Participation Level: Active Listener and Interactive    Participation Quality: Appropriate, Attentive, and Sharing      Speech:  normal      Thought Process/Content: Logical      Affective Functioning: Congruent      Mood: euthymic      Level of consciousness:  Alert and Attentive      Response to Learning: Able to verbalize current knowledge/experience, Able to verbalize/acknowledge new learning, and Progressing to goal      Endings: None Reported    Modes of Intervention: Education, Support, Exploration, and Clarifying      Discipline Responsible: Recreational Therapist      Signature:  JAE Maki

## 2024-07-30 NOTE — GROUP NOTE
Group Therapy Note    Date: 7/30/2024  Start Time: 0805  End Time:  0820  Number of Participants: 10    Type of Group: Community Meeting    Wellness Binder Information  Module Name:  Community Meeting      Patient's Goal:  Patient will be oriented to the unit including but not limited expectations, staff, programming schedule.  Patient will be able to ID expectations of treatment.     Notes: Patient was a participant in community meeting, and was updated on expectations of the unit, staffing, programming schedule, and acclimated to their environment.    Patient shared goal for today as \"keep staying positive\"    Status After Intervention:  Improved    Participation Level: Active Listener and Interactive    Participation Quality: Appropriate and Attentive      Speech:  normal      Thought Process/Content: Logical      Affective Functioning: Congruent      Mood: euthymic      Level of consciousness:  Alert and Attentive      Response to Learning: Able to verbalize current knowledge/experience, Able to verbalize/acknowledge new learning, and Progressing to goal      Endings: None Reported    Modes of Intervention: Exploration, Clarifying, and Problem-solving      Discipline Responsible: Recreational Therapist      Signature:  JAE Maki

## 2024-07-30 NOTE — DISCHARGE SUMMARY
DISCHARGE SUMMARY      Patient ID:  Jaden Savage III  [id number]  29 y.o.  1995    Admit date: 7/21/2024    Discharge date and time: 7/30/2024    Admitting Physician: Trinity Carrion MD     Discharge Physician: Dr Sheyla SEALS    Discharge Diagnoses:   Patient Active Problem List   Diagnosis    Mood disorder (HCC)    Cannabis use disorder, severe, dependence (HCC)    Tobacco use disorder, continuous    Severe recurrent major depression with psychotic features (HCC)    Depression    Gender dysphoria in adult    Suicidal ideation    Depression, major, single episode    Bipolar 1 disorder, manic, moderate (HCC)    Bipolar 1 disorder, depressed (HCC)    Bipolar 1 disorder (HCC)       Admission Condition: poor    Discharged Condition: stable    Admission Circumstance:   Jaden Savage is a 29 year old male, with history of bipolar disorder, with past psychiatric hospitalizations, suicide attempts, immediate family history of suicide in brother by hanging,  placing patient at ongoing high risk of suicide, presents to the ER reporting increasing depression, and suicidal thoughts, homicidal thoughts. He was placed on involuntary hold by ER Dr. Triggering factors include pervasive psychiatric illness, and recent medications changes, it is unclear if he is taking the new medications, but states that they stopped working       PAST MEDICAL/PSYCHIATRIC HISTORY:   Past Medical History:   Diagnosis Date    ADD (attention deficit disorder with hyperactivity)     Anxiety     Cerebral hemorrhage (HCC)     Cerebral palsy (HCC)     Depression     Dyskinesia     Schizophrenia (HCC)        FAMILY/SOCIAL HISTORY:  Family History   Problem Relation Age of Onset    Mental Illness Mother     High Blood Pressure Father     Other Father     Mental Illness Brother      Social History     Socioeconomic History    Marital status: Single     Spouse name: Not on file    Number of children: 0    Years of education: 13    Highest education  tablet  Commonly known as: DESYREL  Take 0.5 tablets by mouth nightly            CONTINUE taking these medications      ARIPiprazole 15 MG tablet  Commonly known as: ABILIFY  Take 1 tablet by mouth daily     ARIPiprazole lauroxil 882 MG/3.2ML Prsy injection  Commonly known as: ARISTADA     divalproex 500 MG DR tablet  Commonly known as: DEPAKOTE  Take 1 tablet by mouth every 12 hours     Melatonin 5 MG Caps  Take 3 mg by mouth nightly as needed (sleep)               Where to Get Your Medications        These medications were sent to [place] [id number] [address] [place] [address] [phone] - F [phone]  [address] [facility] [address]      Phone: [phone]   ARIPiprazole 15 MG tablet  benztropine 1 MG tablet  divalproex 500 MG DR tablet  hydrOXYzine pamoate 50 MG capsule  traZODone 150 MG tablet       You can get these medications from any pharmacy    You don't need a prescription for these medications  Melatonin 5 MG Caps     NOTE: This report was transcribed using voice recognition software. Every effort was made to ensure accuracy; however, inadvertent computerized transcription errors may be present.    TIME SPEND - 35 MINUTES TO COMPLETE THE EVALUATION, DISCHARGE SUMMARY, MEDICATION RECONCILIATION AND FOLLOW UP CARE     Signed:  ELAINE Briones CNP  7/30/2024  10:10 AM

## 2024-07-30 NOTE — TRANSITION OF CARE
injection  Commonly known as: ARISTADA     divalproex 500 MG DR tablet  Commonly known as: DEPAKOTE  Take 1 tablet by mouth every 12 hours     Melatonin 5 MG Caps  Take 3 mg by mouth nightly as needed (sleep)               Where to Get Your Medications        These medications were sent to [place] [id number] - [place], [place] - [address] - P [phone] - F [phone]  [address] [facility] [address]      Phone: [phone]   ARIPiprazole 15 MG tablet  benztropine 1 MG tablet  divalproex 500 MG DR tablet  hydrOXYzine pamoate 50 MG capsule  traZODone 150 MG tablet       You can get these medications from any pharmacy    You don't need a prescription for these medications  Melatonin 5 MG Caps         Unresulted Labs (24h ago, onward)      None            To obtain results of studies pending at discharge, please contact [phone]    Follow-up Information       Follow up With Specialties Details Why Contact Info    [facility]  Go on 8/5/2024 9 AM- mental health intake appointment on 8/5. After this appointment please request counseling and medication management services. [facility]  Address: [address] [place] [id number]   Phone number [phone]  Fax [phone]             Advanced Directive:   Does the patient have an appointed surrogate decision maker? No  Does the patient have a Medical Advance Directive? No  Does the patient have a Psychiatric Advance Directive? No  If the patient does not have a surrogate or Medical Advance Directive AND Psychiatric Advance Directive, the patient was offered information on these advance directives Patient declined to complete    Patient Instructions: Please continue all medications until otherwise directed by physician. Dr Carrion    Tobacco Cessation Discharge Plan:   Is the patient a tobacco user  and needs referral for tobacco cessation? Yes  Patient referred to the following for tobacco cessation with an  appointment? Yes [facility]                   Date:  Friday 8/2 at 10am  [address]   [place], [place] [address]  Patient was offered medication to assist with tobacco cessation at discharge? Patient refused    Alcohol/Substance Abuse Discharge Plan:   Does the patient have a history of substance/alcohol abuse and requires a referral for treatment? Yes  Patient referred to the following for substance/alcohol abuse treatment with an appointment? Yes, patient refused (Toxicology screen was positive for THC)  Patient was offered medication to assist with substance/alcohol abuse cessation at discharge? No not available for Cannabinoid use.       Patient discharged to: Home; Transition record discussed with patient/caregiver and provided this record in hard copy or electronically

## 2024-07-30 NOTE — PROGRESS NOTES
[facility]  Discharge Note    Pt discharged with followings belongings:   Dental Appliances: None  Vision - Corrective Lenses: Eyeglasses  Hearing Aid: None  Jewelry: None  Body Piercings Removed: N/A  Clothing: Footwear, Shirt, Pants  Other Valuables: Wallet, Lighter/Matches   Patient discharged with all belongings that he arrived to the ER with.  Status EXAM upon discharge:  Mental Status and Behavioral Exam  Normal: No  Level of Assistance: Independent/Self  Facial Expression: Brightened  Affect: Congruent  Level of Consciousness: Alert  Frequency of Checks: 4 times per hour, close  Mood:Normal: No  Mood: Anxious  Motor Activity:Normal: Yes  Motor Activity: Increased  Eye Contact: Good  Observed Behavior: Cooperative, Friendly  Sexual Misconduct History: Current - no  Preception: [place] to person, [place] to time, [place] to place, [place] to situation  Attention:Normal: No  Attention: Distractible  Thought Processes: Circumstantial  Thought Content:Normal: No  Thought Content: Preoccupations  Depression Symptoms: Feelings of helplessness, Feelings of hopelessess, Feelings of worthlessness  Anxiety Symptoms: Generalized  Breana Symptoms: No problems reported or observed.  Hallucinations: None  Delusions: No  Delusions: Somatic  Memory:Normal: No  Memory: Poor recent, Poor remote  Insight and Judgment: No  Insight and Judgment: Poor judgment, Poor insight    Tobacco Screening:  Practical Counseling, on admission, graeme X, if applicable and completed (first 3 are required if patient doesn't refuse)            (x ) Recognizing danger situations (included triggers and roadblocks)                    ( x) Coping skills (new ways to manage stress,relaxation techniques, changing routine, distraction)                                                           (x ) Basic information about quitting (benefits of quitting, techniques in how to quit, available resources  ( x) [facility] for [facility]                                                                                                                   ( ) Patient refused counseling  ( ) Patient refused referral  ( ) Patient refused prescription upon discharge  ( ) Patient has not smoked in the last 30 days    Metabolic Screening:    Lab Results   Component Value Date    LABA1C 5.2 08/03/2023       Lab Results   Component Value Date    CHOL 127 03/10/2024    CHOL 154 09/28/2022    CHOL 100 03/14/2021     Lab Results   Component Value Date    TRIG 117 03/10/2024    TRIG 97 09/28/2022    TRIG 64 03/14/2021     Lab Results   Component Value Date    HDL 26 (L) 03/10/2024    HDL 31 (L) 08/03/2023    HDL 28 09/28/2022    HDL 29 03/14/2021     No components found for: \"LDLCAL\"  No components found for: \"LABVLDL\"    Chandni Ruvalcaba RN

## 2024-07-30 NOTE — CARE COORDINATION
CLAUDE met with the pt to discuss his discharge. Pt reports feeling good today, denied SI/HI/AVH. Pt expressed feeling ready to be discharged home and he plans to have his uncle pick him up. Pt will continue to treat with [facility]. Pt is considering going back to school at [employer]. Collateral was gained from pt father who confirmed the pt does not have access to any guns or weapons. Pt cooperative, pleasant, with good eye contact, clear speech, improved insight/judgement.     CLAUDE contacted [facility] [phone] and notified them of pt RODGERS and when it will be due next.     CLAUDE contacted pt father Jaden [phone] (WEI signed) and notified him of pt discharge for today. Jaden has no concerns for the pt discharging home at this time as long as he will have meds in hand. Jaden also wanted to confirm the pt was scheduled with [facility] and not [facility]. No additional questions or concerns.    In order to ensure appropriate transition and discharge planning is in place, the following documents have been transmitted to [facility], as the new outpatient provider:    The d/c diagnosis was transmitted to the next care provider  The reason for hospitalization was transmitted to the next care provider  The d/c medications (dosage and indication) were transmitted to the next care provider   The continuing care plan was transmitted to the next care provider

## 2024-07-30 NOTE — PLAN OF CARE
Problem: Risk for Elopement  Goal: Patient will not exit the unit/facility without proper excort  7/29/2024 2249 by Lilian Mac RN  Outcome: Progressing     Problem: Self Harm/Suicidality  Goal: Will have no self-injury during hospital stay  Description: INTERVENTIONS:  1.  Ensure constant observer at bedside with Q15M safety checks  2.  Maintain a safe environment  3.  Secure patient belongings  4.  Ensure family/visitors adhere to safety recommendations  5.  Ensure safety tray has been added to patient's diet order  6.  Every shift and PRN: Re-assess suicidal risk via Frequent Screener    7/29/2024 2249 by Lilian Mac RN  Outcome: Progressing     Problem: Depression  Goal: Will be euthymic at discharge  Description: INTERVENTIONS:  1. Administer medication as ordered  2. Provide emotional support via 1:1 interaction with staff  3. Encourage involvement in milieu/groups/activities  4. Monitor for social isolation  7/29/2024 2249 by Lilian Mac RN  Outcome: Progressing     Problem: Behavior  Goal: Pt/Family maintain appropriate behavior and adhere to behavioral management agreement, if implemented  Description: INTERVENTIONS:  1. Assess patient/family's coping skills and  non-compliant behavior (including use of illegal substances)  2. Notify security of behavior or suspected illegal substances which indicate the need for search of the family and/or belongings  3. Encourage verbalization of thoughts and concerns in a socially appropriate manner  4. Utilize positive, consistent limit setting strategies supporting safety of patient, staff and others  5. Encourage participation in the decision making process about the behavioral management agreement  6. If a visitor's behavior poses a threat to safety call refer to organization policy.  7. Initiate consult with , Psychosocial CNS, Spiritual Care as appropriate  7/29/2024 2249 by Lilian Mac RN  Outcome: Progressing     Problem:

## 2024-07-30 NOTE — PLAN OF CARE
Problem: Pain  Goal: Verbalizes/displays adequate comfort level or baseline comfort level  Outcome: Progressing     Problem: Risk for Elopement  Goal: Patient will not exit the unit/facility without proper excort  7/30/2024 0857 by Chandni Ruvalcaba RN  Outcome: Progressing     Problem: Self Harm/Suicidality  Goal: Will have no self-injury during hospital stay  Description: INTERVENTIONS:  1.  Ensure constant observer at bedside with Q15M safety checks  2.  Maintain a safe environment  3.  Secure patient belongings  4.  Ensure family/visitors adhere to safety recommendations  5.  Ensure safety tray has been added to patient's diet order  6.  Every shift and PRN: Re-assess suicidal risk via Frequent Screener    7/30/2024 0857 by Chandni Ruvalcaba RN  Outcome: Progressing     Problem: Depression  Goal: Will be euthymic at discharge  Description: INTERVENTIONS:  1. Administer medication as ordered  2. Provide emotional support via 1:1 interaction with staff  3. Encourage involvement in milieu/groups/activities  4. Monitor for social isolation  7/30/2024 0857 by Chandni Ruvalcaba RN  Outcome: Progressing     Problem: Behavior  Goal: Pt/Family maintain appropriate behavior and adhere to behavioral management agreement, if implemented  Description: INTERVENTIONS:  1. Assess patient/family's coping skills and  non-compliant behavior (including use of illegal substances)  2. Notify security of behavior or suspected illegal substances which indicate the need for search of the family and/or belongings  3. Encourage verbalization of thoughts and concerns in a socially appropriate manner  4. Utilize positive, consistent limit setting strategies supporting safety of patient, staff and others  5. Encourage participation in the decision making process about the behavioral management agreement  6. If a visitor's behavior poses a threat to safety call refer to organization policy.  7. Initiate consult with , Psychosocial CNS,

## 2025-05-03 ENCOUNTER — HOSPITAL ENCOUNTER (INPATIENT)
Age: 30
LOS: 5 days | Discharge: HOME | DRG: 753 | End: 2025-05-08
Attending: STUDENT IN AN ORGANIZED HEALTH CARE EDUCATION/TRAINING PROGRAM | Admitting: PSYCHIATRY & NEUROLOGY
Payer: COMMERCIAL

## 2025-05-03 DIAGNOSIS — R45.851 SUICIDAL IDEATION: Primary | ICD-10-CM

## 2025-05-03 LAB
ALBUMIN SERPL-MCNC: 3.9 G/DL (ref 3.5–5.2)
ALP SERPL-CCNC: 64 U/L (ref 40–129)
ALT SERPL-CCNC: 16 U/L (ref 0–50)
AMPHET UR QL SCN: NEGATIVE
ANION GAP SERPL CALCULATED.3IONS-SCNC: 10 MMOL/L (ref 7–16)
APAP SERPL-MCNC: <5 UG/ML (ref 10–30)
AST SERPL-CCNC: 24 U/L (ref 0–50)
BARBITURATES UR QL SCN: NEGATIVE
BASOPHILS # BLD: 0.03 K/UL (ref 0–0.2)
BASOPHILS NFR BLD: 0 % (ref 0–2)
BENZODIAZ UR QL: NEGATIVE
BILIRUB DIRECT SERPL-MCNC: 0.2 MG/DL (ref 0–0.2)
BILIRUB INDIRECT SERPL-MCNC: 0.2 MG/DL (ref 0–1)
BILIRUB SERPL-MCNC: 0.4 MG/DL (ref 0–1.2)
BUN SERPL-MCNC: 6 MG/DL (ref 6–20)
BUPRENORPHINE UR QL: NEGATIVE
CALCIUM SERPL-MCNC: 9.2 MG/DL (ref 8.6–10)
CANNABINOIDS UR QL SCN: POSITIVE
CHLORIDE SERPL-SCNC: 104 MMOL/L (ref 98–107)
CO2 SERPL-SCNC: 26 MMOL/L (ref 22–29)
COCAINE UR QL SCN: NEGATIVE
CREAT SERPL-MCNC: 0.9 MG/DL (ref 0.7–1.2)
DATE LAST DOSE: ABNORMAL
EOSINOPHIL # BLD: 0.05 K/UL (ref 0.05–0.5)
EOSINOPHILS RELATIVE PERCENT: 1 % (ref 0–6)
ERYTHROCYTE [DISTWIDTH] IN BLOOD BY AUTOMATED COUNT: 12.1 % (ref 11.5–15)
ETHANOLAMINE SERPL-MCNC: <10 MG/DL (ref 0–0.08)
FENTANYL UR QL: NEGATIVE
GFR, ESTIMATED: >90 ML/MIN/1.73M2
GLUCOSE SERPL-MCNC: 121 MG/DL (ref 74–99)
HCT VFR BLD AUTO: 48.1 % (ref 37–54)
HGB BLD-MCNC: 16.7 G/DL (ref 12.5–16.5)
IMM GRANULOCYTES # BLD AUTO: <0.03 K/UL (ref 0–0.58)
IMM GRANULOCYTES NFR BLD: 0 % (ref 0–5)
LYMPHOCYTES NFR BLD: 2.54 K/UL (ref 1.5–4)
LYMPHOCYTES RELATIVE PERCENT: 31 % (ref 20–42)
MCH RBC QN AUTO: 31.7 PG (ref 26–35)
MCHC RBC AUTO-ENTMCNC: 34.7 G/DL (ref 32–34.5)
MCV RBC AUTO: 91.3 FL (ref 80–99.9)
METHADONE UR QL: NEGATIVE
MONOCYTES NFR BLD: 0.4 K/UL (ref 0.1–0.95)
MONOCYTES NFR BLD: 5 % (ref 2–12)
NEUTROPHILS NFR BLD: 63 % (ref 43–80)
NEUTS SEG NFR BLD: 5.26 K/UL (ref 1.8–7.3)
OPIATES UR QL SCN: NEGATIVE
OXYCODONE UR QL SCN: NEGATIVE
PCP UR QL SCN: NEGATIVE
PLATELET # BLD AUTO: 197 K/UL (ref 130–450)
PMV BLD AUTO: 11.1 FL (ref 7–12)
POTASSIUM SERPL-SCNC: 4 MMOL/L (ref 3.5–5.1)
PROT SERPL-MCNC: 6.5 G/DL (ref 6.4–8.3)
RBC # BLD AUTO: 5.27 M/UL (ref 3.8–5.8)
SALICYLATES SERPL-MCNC: <0.5 MG/DL (ref 0–30)
SODIUM SERPL-SCNC: 140 MMOL/L (ref 136–145)
TEST INFORMATION: ABNORMAL
TME LAST DOSE: ABNORMAL H
TOXIC TRICYCLIC SC,BLOOD: NEGATIVE
VALPROATE SERPL-MCNC: 8 UG/ML (ref 50–100)
VANCOMYCIN DOSE: ABNORMAL MG
WBC OTHER # BLD: 8.3 K/UL (ref 4.5–11.5)

## 2025-05-03 PROCEDURE — 80179 DRUG ASSAY SALICYLATE: CPT

## 2025-05-03 PROCEDURE — 80053 COMPREHEN METABOLIC PANEL: CPT

## 2025-05-03 PROCEDURE — 80143 DRUG ASSAY ACETAMINOPHEN: CPT

## 2025-05-03 PROCEDURE — 85025 COMPLETE CBC W/AUTO DIFF WBC: CPT

## 2025-05-03 PROCEDURE — GZHZZZZ GROUP PSYCHOTHERAPY: ICD-10-PCS | Performed by: PSYCHIATRY & NEUROLOGY

## 2025-05-03 PROCEDURE — 90792 PSYCH DIAG EVAL W/MED SRVCS: CPT | Performed by: REGISTERED NURSE

## 2025-05-03 PROCEDURE — 99285 EMERGENCY DEPT VISIT HI MDM: CPT

## 2025-05-03 PROCEDURE — 80307 DRUG TEST PRSMV CHEM ANLYZR: CPT

## 2025-05-03 PROCEDURE — 6370000000 HC RX 637 (ALT 250 FOR IP): Performed by: PSYCHIATRY & NEUROLOGY

## 2025-05-03 PROCEDURE — 82248 BILIRUBIN DIRECT: CPT

## 2025-05-03 PROCEDURE — 93005 ELECTROCARDIOGRAM TRACING: CPT | Performed by: STUDENT IN AN ORGANIZED HEALTH CARE EDUCATION/TRAINING PROGRAM

## 2025-05-03 PROCEDURE — G0480 DRUG TEST DEF 1-7 CLASSES: HCPCS

## 2025-05-03 PROCEDURE — 1240000000 HC EMOTIONAL WELLNESS R&B

## 2025-05-03 PROCEDURE — 80164 ASSAY DIPROPYLACETIC ACD TOT: CPT

## 2025-05-03 RX ORDER — HYDROXYZINE HYDROCHLORIDE 50 MG/1
50 TABLET, FILM COATED ORAL 3 TIMES DAILY PRN
Status: DISCONTINUED | OUTPATIENT
Start: 2025-05-03 | End: 2025-05-08 | Stop reason: HOSPADM

## 2025-05-03 RX ORDER — HALOPERIDOL 5 MG/ML
5 INJECTION INTRAMUSCULAR EVERY 6 HOURS PRN
Status: DISCONTINUED | OUTPATIENT
Start: 2025-05-03 | End: 2025-05-08 | Stop reason: HOSPADM

## 2025-05-03 RX ORDER — HALOPERIDOL 5 MG/1
5 TABLET ORAL EVERY 6 HOURS PRN
Status: DISCONTINUED | OUTPATIENT
Start: 2025-05-03 | End: 2025-05-08 | Stop reason: HOSPADM

## 2025-05-03 RX ORDER — MAGNESIUM HYDROXIDE/ALUMINUM HYDROXICE/SIMETHICONE 120; 1200; 1200 MG/30ML; MG/30ML; MG/30ML
30 SUSPENSION ORAL PRN
Status: DISCONTINUED | OUTPATIENT
Start: 2025-05-03 | End: 2025-05-08 | Stop reason: HOSPADM

## 2025-05-03 RX ORDER — ACETAMINOPHEN 325 MG/1
650 TABLET ORAL EVERY 6 HOURS PRN
Status: DISCONTINUED | OUTPATIENT
Start: 2025-05-03 | End: 2025-05-08 | Stop reason: HOSPADM

## 2025-05-03 RX ORDER — NICOTINE 21 MG/24HR
1 PATCH, TRANSDERMAL 24 HOURS TRANSDERMAL DAILY
Status: DISCONTINUED | OUTPATIENT
Start: 2025-05-03 | End: 2025-05-04

## 2025-05-03 RX ADMIN — HYDROXYZINE HYDROCHLORIDE 50 MG: 50 TABLET, FILM COATED ORAL at 23:21

## 2025-05-03 RX ADMIN — Medication 3 MG: at 23:21

## 2025-05-03 ASSESSMENT — PATIENT HEALTH QUESTIONNAIRE - PHQ9
9. THOUGHTS THAT YOU WOULD BE BETTER OFF DEAD, OR OF HURTING YOURSELF: NEARLY EVERY DAY
SUM OF ALL RESPONSES TO PHQ QUESTIONS 1-9: 16
4. FEELING TIRED OR HAVING LITTLE ENERGY: MORE THAN HALF THE DAYS
SUM OF ALL RESPONSES TO PHQ QUESTIONS 1-9: 13
1. LITTLE INTEREST OR PLEASURE IN DOING THINGS: MORE THAN HALF THE DAYS
SUM OF ALL RESPONSES TO PHQ QUESTIONS 1-9: 16
8. MOVING OR SPEAKING SO SLOWLY THAT OTHER PEOPLE COULD HAVE NOTICED. OR THE OPPOSITE, BEING SO FIGETY OR RESTLESS THAT YOU HAVE BEEN MOVING AROUND A LOT MORE THAN USUAL: SEVERAL DAYS
6. FEELING BAD ABOUT YOURSELF - OR THAT YOU ARE A FAILURE OR HAVE LET YOURSELF OR YOUR FAMILY DOWN: NEARLY EVERY DAY
2. FEELING DOWN, DEPRESSED OR HOPELESS: MORE THAN HALF THE DAYS
10. IF YOU CHECKED OFF ANY PROBLEMS, HOW DIFFICULT HAVE THESE PROBLEMS MADE IT FOR YOU TO DO YOUR WORK, TAKE CARE OF THINGS AT HOME, OR GET ALONG WITH OTHER PEOPLE: VERY DIFFICULT
3. TROUBLE FALLING OR STAYING ASLEEP: MORE THAN HALF THE DAYS
5. POOR APPETITE OR OVEREATING: NOT AT ALL
7. TROUBLE CONCENTRATING ON THINGS, SUCH AS READING THE NEWSPAPER OR WATCHING TELEVISION: SEVERAL DAYS
SUM OF ALL RESPONSES TO PHQ QUESTIONS 1-9: 16

## 2025-05-03 ASSESSMENT — SLEEP AND FATIGUE QUESTIONNAIRES
AVERAGE NUMBER OF SLEEP HOURS: 8
DO YOU HAVE DIFFICULTY SLEEPING: YES
DO YOU USE A SLEEP AID: YES
SLEEP PATTERN: DISTURBED/INTERRUPTED SLEEP;RESTLESSNESS

## 2025-05-03 NOTE — BH NOTE
The patient is not currently under constant observation.    CSSR-S Screening and Risk Assessment completed and discussed with patient who scored Risk of Suicide: Moderate Risk    Current mitigating factors: Patient currently in a ligature resistant environment with every 15-minute safety checks., Patient feels safe in new environment due to being removed from triggering factors, and Lacks means at present time    AZ Kiran was notified of score and discussed risk/protective factors.     The provider recommends Discontinuing constant observation.
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
## 2025-05-03 NOTE — PLAN OF CARE
[facility]  Admission Note     Patient is a 31 y/o male involuntary admission that arrived to the unit at 5:50pm via wheelchair transfer from [facility].  He has been non-compliant with his medications and has had increased symptoms of depression and anxiety including hopelessness, anger, irritability, frustration, sadness, and quilt/remorse.  Upon assessment patient is A + O x 4.  VSS.  Denies pain.  Denies current A/V/H, SI, HI.  Affect is flat.  Appears well groomed.  Skin clean, dry, intact.  Patient contracts for safety with this nurse.  Unit rules and expectations reviewed.  Oriented to room and unit.  Offered food and fluids.  Admission paperwork reviewed and signed, patient OQ assessment completed, patient signed WEI for his parents.  Will continue to monitor q 15 minutes for safety and perform environmental checks and purposeful rounding.    Admission Type:   Admission Type: Involuntary    Reason for admission:  Reason for Admission: \"I've been feeling suicidal, hopeless, angry, frustrated and lashing out and arguing with my Dad a lot\"      Addictive Behavior:   Addictive Behavior  In the Past 3 Months, Have You Felt or Has Someone Told You That You Have a Problem With  : None    Medical Problems:   Past Medical History:   Diagnosis Date    ADD (attention deficit disorder with hyperactivity)     Anxiety     Cerebral hemorrhage (HCC)     Cerebral palsy ([facility])     Depression     Dyskinesia     Schizophrenia ([facility])        Status EXAM:  Mental Status and Behavioral Exam  Normal: No  Level of Assistance: Independent/Self  Facial Expression: Avoids Gaze, Flat  Affect: Blunt  Level of Consciousness: Alert  Frequency of Checks: 4 times per hour, close  Mood:Normal: No  Mood: Depressed, Anxious, Worthless, low self-esteem  Motor Activity:Normal: Yes  Eye Contact: Fair  Observed Behavior: Withdrawn, Cooperative, Guarded, Friendly  Sexual Misconduct History: Current - no  Preception: [place] to person,

## 2025-05-03 NOTE — VIRTUAL HEALTH
Jaden DEWITT Hussein III  [id number]  1995     EMERGENCY DEPARTMENT TELEPSYCHIATRY EVALUATION    05/03/25    Chief Complaint:  “Suicidal ideation”    Per chart review patient reports he would complete suicide by any way he could harm himself.  Getting in fights with Dad.    HPI: Patient is a 30 y.o.  male who presents for suicidal thoughts. Patient presented to the ED on 05/03/25 from home    Endorses suicidal thoughts for many weeks getting worse over several, states last few weeks difficult to control suicidal thoughts. \"A lot of things go through my head, I dont want to wake up anymore, if I had the opportunity I would do it    Duration: chronic  Severity: Rating mood to be around 2/10 (10- good)  Quality:melancholic  Worse all day  Content: Hopeless, worthless and helpless feeling  Suicidal thoughts active multiple plans  Associated symptoms:  Poor concentration, anhedonia, decrease motivation  Sleep and appetite- poor sleep , adeqaute appetite   Modifying factor: psychosocial stressors- fights with Dad something else     Aristada (missed last injection was due on 4/29/25)  Depakote and Trazodone     States transportation is barrier to getting to appts     Intermittent auditory and visual hallucinations, states last occurred a few days ago denies currently; endorses paranoia    Past Psychiatric History:  Previous Diagnoses/symptoms: Bipolar  Previous suicide attempts/self-harm: yes Over on seroquel   Inpatient psychiatric hospitalizations: yes  Current outpatient psychiatric provider: [facility]  Current therapist: States not in therapy  Previous psychiatric medication trials: Cogentin  Current psychiatric medications: Depakote Airisitda Trazodone  History of ECT: no  Family Psychiatric History: Mom MDD, bother committed suicide     Substance Abuse History:  Tobacco: Endorses cigerettes  Alcohol: Denies  Marijuana: Endorses daily  Stimulant: Denies  Opiates: Denies  Benzodiazepine: Denies  Other illicit drug

## 2025-05-03 NOTE — BH NOTE
4 Eyes Skin Assessment     NAME:  Jaden Savage III  YOB: 1995  MEDICAL RECORD NUMBER:  [id number]    The patient is being assessed for  Admission    I agree that at least one RN has performed a thorough Head to Toe Skin Assessment on the patient. ALL assessment sites listed below have been assessed.      Areas assessed by both nurses:    Head, Face, Ears, Shoulders, Back, Chest, Arms, Elbows, Hands, Sacrum. Buttock, Coccyx, Ischium, and Legs. Feet and Heels        Does the Patient have a Wound? No noted wound(s)       Jamison Prevention initiated by RN: Yes  Wound Care Orders initiated by RN: No    Pressure Injury (Stage 3,4, Unstageable, DTI, NWPT, and Complex wounds) if present, place Wound referral order by RN under : No    New Ostomies, if present place, Ostomy referral order under : No     Nurse 1 eSignature: Electronically signed by Lydia San RN on 5/3/25 at 7:29 PM EDT    **SHARE this note so that the co-signing nurse can place an eSignature**    Nurse 2 eSignature: Electronically signed by Evin Robles RN on 5/3/25 at 7:35 PM EDT

## 2025-05-04 LAB
CHOLEST SERPL-MCNC: 125 MG/DL
HBA1C MFR BLD: 5.1 % (ref 4–5.6)
HDLC SERPL-MCNC: 26 MG/DL
LDLC SERPL CALC-MCNC: 83 MG/DL
TRIGL SERPL-MCNC: 79 MG/DL
VLDLC SERPL CALC-MCNC: 16 MG/DL

## 2025-05-04 PROCEDURE — 6370000000 HC RX 637 (ALT 250 FOR IP): Performed by: PSYCHIATRY & NEUROLOGY

## 2025-05-04 PROCEDURE — 6370000000 HC RX 637 (ALT 250 FOR IP): Performed by: NURSE PRACTITIONER

## 2025-05-04 PROCEDURE — 83036 HEMOGLOBIN GLYCOSYLATED A1C: CPT

## 2025-05-04 PROCEDURE — 90792 PSYCH DIAG EVAL W/MED SRVCS: CPT | Performed by: NURSE PRACTITIONER

## 2025-05-04 PROCEDURE — 80061 LIPID PANEL: CPT

## 2025-05-04 PROCEDURE — 1240000000 HC EMOTIONAL WELLNESS R&B

## 2025-05-04 PROCEDURE — 36415 COLL VENOUS BLD VENIPUNCTURE: CPT

## 2025-05-04 RX ORDER — DIVALPROEX SODIUM 500 MG/1
500 TABLET, DELAYED RELEASE ORAL EVERY 12 HOURS SCHEDULED
Status: DISCONTINUED | OUTPATIENT
Start: 2025-05-04 | End: 2025-05-08 | Stop reason: HOSPADM

## 2025-05-04 RX ORDER — POLYETHYLENE GLYCOL 3350 17 G
2 POWDER IN PACKET (EA) ORAL
Status: DISCONTINUED | OUTPATIENT
Start: 2025-05-04 | End: 2025-05-08 | Stop reason: HOSPADM

## 2025-05-04 RX ORDER — ARIPIPRAZOLE 5 MG/1
5 TABLET ORAL DAILY
Status: DISCONTINUED | OUTPATIENT
Start: 2025-05-04 | End: 2025-05-05

## 2025-05-04 RX ADMIN — HYDROXYZINE HYDROCHLORIDE 50 MG: 50 TABLET, FILM COATED ORAL at 12:05

## 2025-05-04 RX ADMIN — Medication 3 MG: at 21:12

## 2025-05-04 RX ADMIN — ARIPIPRAZOLE 5 MG: 5 TABLET ORAL at 14:24

## 2025-05-04 RX ADMIN — HYDROXYZINE HYDROCHLORIDE 50 MG: 50 TABLET, FILM COATED ORAL at 21:12

## 2025-05-04 RX ADMIN — DIVALPROEX SODIUM 500 MG: 500 TABLET, DELAYED RELEASE ORAL at 21:12

## 2025-05-04 ASSESSMENT — PATIENT HEALTH QUESTIONNAIRE - PHQ9
4. FEELING TIRED OR HAVING LITTLE ENERGY: MORE THAN HALF THE DAYS
5. POOR APPETITE OR OVEREATING: NOT AT ALL
SUM OF ALL RESPONSES TO PHQ QUESTIONS 1-9: 13
9. THOUGHTS THAT YOU WOULD BE BETTER OFF DEAD, OR OF HURTING YOURSELF: NEARLY EVERY DAY
6. FEELING BAD ABOUT YOURSELF - OR THAT YOU ARE A FAILURE OR HAVE LET YOURSELF OR YOUR FAMILY DOWN: NEARLY EVERY DAY
3. TROUBLE FALLING OR STAYING ASLEEP: MORE THAN HALF THE DAYS
1. LITTLE INTEREST OR PLEASURE IN DOING THINGS: MORE THAN HALF THE DAYS
7. TROUBLE CONCENTRATING ON THINGS, SUCH AS READING THE NEWSPAPER OR WATCHING TELEVISION: SEVERAL DAYS
8. MOVING OR SPEAKING SO SLOWLY THAT OTHER PEOPLE COULD HAVE NOTICED. OR THE OPPOSITE, BEING SO FIGETY OR RESTLESS THAT YOU HAVE BEEN MOVING AROUND A LOT MORE THAN USUAL: SEVERAL DAYS
SUM OF ALL RESPONSES TO PHQ QUESTIONS 1-9: 16
SUM OF ALL RESPONSES TO PHQ QUESTIONS 1-9: 16
2. FEELING DOWN, DEPRESSED OR HOPELESS: MORE THAN HALF THE DAYS
10. IF YOU CHECKED OFF ANY PROBLEMS, HOW DIFFICULT HAVE THESE PROBLEMS MADE IT FOR YOU TO DO YOUR WORK, TAKE CARE OF THINGS AT HOME, OR GET ALONG WITH OTHER PEOPLE: VERY DIFFICULT
SUM OF ALL RESPONSES TO PHQ QUESTIONS 1-9: 16

## 2025-05-04 ASSESSMENT — SLEEP AND FATIGUE QUESTIONNAIRES
AVERAGE NUMBER OF SLEEP HOURS: 8
SLEEP PATTERN: DISTURBED/INTERRUPTED SLEEP;RESTLESSNESS
DO YOU HAVE DIFFICULTY SLEEPING: YES
DO YOU USE A SLEEP AID: YES

## 2025-05-04 NOTE — CARE COORDINATION
Pt verbalized that father is best person to call.     SW called pt father Noam [phone] (WEI signed) to gain collateral. He states that pt has not been taking his mental health medications as prescribed. He states that he is in the hospital himself for medical issues. He just wants to ensure pt is back on his mental health medications. He states that pt will return home with them at discharge and denied pt access to guns/weapons. He denied further questions or concerns for SW.

## 2025-05-04 NOTE — PLAN OF CARE
Pt resting in room through shift. Pt denies HI. Pt states that he is thinking about suicide, but has no plan or intent and that \"they are just thoughts.\" Pt states that he believes he sees \"numbers on the wall sometimes.\"Pt states that he does feel safe. Pt requested medication to help with sleep.    Problem: Self Harm/Suicidality  Goal: Will have no self-injury during hospital stay  Description: INTERVENTIONS:1.  Ensure constant observer at bedside with Q15M safety checks2.  Maintain a safe environment3.  Secure patient belongings4.  Ensure family/visitors adhere to safety recommendations5.  Ensure safety tray has been added to patient's diet order6.  Every shift and PRN: Re-assess suicidal risk via Frequent Screener  5/4/2025 0116 by Evin Robles, RN  Outcome: Progressing     Problem: Depression  Goal: Will be euthymic at discharge  Description: INTERVENTIONS:1. Administer medication as ordered2. Provide emotional support via 1:1 interaction with staff3. Encourage involvement in milieu/groups/activities4. Monitor for social isolation  5/4/2025 0116 by Evin Robles, RN  Outcome: Progressing     Problem: Anxiety  Goal: Will report anxiety at manageable levels  Description: INTERVENTIONS:1. Administer medication as ordered2. Teach and rehearse alternative coping skills3. Provide emotional support with 1:1 interaction with staff  5/4/2025 0116 by Evin Robles, RN  Outcome: Progressing

## 2025-05-04 NOTE — PLAN OF CARE
Denies suicidal ideations or thoughts of self harm.  Denies homicidal ideations or thoughts to hurt others.  Denies auditory and visual hallucinations.  Fair eye contact, flat affect.  Attended one on unit group today.  Medication compliant.  Appropriate and social with his peers on the unit.  Able to make his needs known.  Up for meals.

## 2025-05-04 NOTE — CARE COORDINATION
Leisure assessment completed.     05/04/25 8510   Activities of Daily Living   Patient Requires assistance with daily self-care activities? Yes   Patient identified needing assistance with: Transportation   Details Pt reported he does not drive   Person Assisting Friend   Person Assisting details Pt reported a friend will assist with transportation or he will utilize the bus   Leisure Activity 1   3 Favorite Leisure Activities \"Writing.\"   Frequency several times a year   Last time in the past year   Barriers to participating  physical   Leisure Activity 2   Favorite Leisure Activities  same as above   Leisure Activity 3   Favorite Leisure Activities  same as above   Social   Patient reports spending the majority of their free time alone   Patient verbalizes a preference for spending free time   (\"Sometimes alone.\")   Patient’s perception of support system healthy/strong   Patient’s perception of barriers to socializing with others include(s) lack of comfort;lack of skills;transportation   Social Details Pt identified his parents as being supportive. Pt reported he lives with his parents, is unemployed, has no children.   Beliefs & Coping   Has difficulty dealing with feelings     (\"Sometimes.\")   Internalizes feelings/Keeps feelings in   (\"Sometimes.\")   Externalizes feelings through aggressiveness or poor temper control  No   Feels uncomfortable around others    (\"Sometimes.\")   Has difficulty talking to others  Yes   Depends on others for direction or decisions   (\"Sometimes.\")   Difficulty dealing with anger of others  Yes   Difficulty dealing with own anger  Yes   Difficulty managing stress Yes   Frequently has difficulty with relationships  No   Has recently perceived/experienced loss, disappointment, humiliation or failure  NO   General perception about self ambivalent   Attitude about abilities more successful than not   Locus of Control  never   Belief about recovery Recovery is possible   Patient
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
## 2025-05-04 NOTE — GROUP NOTE
Group Therapy Note    Date: 5/4/2025    Group Start Time: 1045  Group End Time: 1130  Group Topic: Cognitive Skills    [facility] ACUTE [facility] 1    Joanie Panda MSW, LSW        Group Therapy Note    Attendees: 12       Patient's Goal:  Pt will be able to discuss boundaries and learn ways to set boundaries.     Notes:  Pt was an active participant in group discussion.     Status After Intervention:  Improved    Participation Level: Active Listener and Interactive    Participation Quality: Appropriate, Attentive, Sharing, and Supportive      Speech:  normal      Thought Process/Content: Logical  Linear      Affective Functioning: Congruent      Mood: anxious and depressed      Level of consciousness:  Alert, Oriented x4, and Attentive      Response to Learning: Able to verbalize current knowledge/experience, Able to verbalize/acknowledge new learning, Able to retain information, Capable of insight, and Progressing to goal      Endings: None Reported    Modes of Intervention: Education, Support, Socialization, Exploration, Clarifying, and Problem-solving      Discipline Responsible: /Counselor      Signature:  PACHECO Huitron LSW

## 2025-05-04 NOTE — DISCHARGE INSTRUCTIONS
Patient was offered a referral to substance abuse treatment, patient declined referral at this time.      Follow up for Tobacco Cessation at:    [facility]                                 Date:  Friday 5/9 at 10am              [address] [place] [address]   (Inside [facility]    take B elevators to 7th floor)   Phone: [phone]   Fax: [phone]

## 2025-05-04 NOTE — BH NOTE
[facility] [place]  Initial Interdisciplinary Treatment Plan NOTE    Review Date & Time: 5/4/25 @ 11a    Patient was in treatment team    Admission Type:   Admission Type: Involuntary    Reason for admission:  Reason for Admission: \"I've been feeling suicidal, hopeless, angry, frustrated and lashing out and arguing with my Dad a lot\"      Estimated Length of Stay Update:  5/5/25  Estimated Discharge Date Update: 5/5/25    EDUCATION:   Learner Progress Toward Treatment Goals: Reviewed results and recommendations of this team, Reviewed group plan and strategies, Reviewed signs, symptoms and risk of self harm and violent behavior, and Reviewed goals and plan of care    Method: Small group    Outcome: Verbalized understanding and Needs reinforcement    PATIENT GOALS: Take medications as prescribed, attend group sessions    PLAN/TREATMENT RECOMMENDATIONS UPDATE:5/5/25    GOALS UPDATE:   Time frame for Short-Term Goals: 2 days    Lydia San RN
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
## 2025-05-05 LAB
EKG ATRIAL RATE: 63 BPM
EKG P AXIS: 57 DEGREES
EKG P-R INTERVAL: 172 MS
EKG Q-T INTERVAL: 398 MS
EKG QRS DURATION: 98 MS
EKG QTC CALCULATION (BAZETT): 407 MS
EKG R AXIS: 93 DEGREES
EKG T AXIS: 58 DEGREES
EKG VENTRICULAR RATE: 63 BPM

## 2025-05-05 PROCEDURE — 6370000000 HC RX 637 (ALT 250 FOR IP): Performed by: NURSE PRACTITIONER

## 2025-05-05 PROCEDURE — 6370000000 HC RX 637 (ALT 250 FOR IP): Performed by: PSYCHIATRY & NEUROLOGY

## 2025-05-05 PROCEDURE — 1240000000 HC EMOTIONAL WELLNESS R&B

## 2025-05-05 PROCEDURE — 93010 ELECTROCARDIOGRAM REPORT: CPT | Performed by: INTERNAL MEDICINE

## 2025-05-05 PROCEDURE — 99232 SBSQ HOSP IP/OBS MODERATE 35: CPT | Performed by: NURSE PRACTITIONER

## 2025-05-05 RX ORDER — ARIPIPRAZOLE 10 MG/1
10 TABLET ORAL DAILY
Status: DISCONTINUED | OUTPATIENT
Start: 2025-05-06 | End: 2025-05-08 | Stop reason: HOSPADM

## 2025-05-05 RX ADMIN — Medication 3 MG: at 20:49

## 2025-05-05 RX ADMIN — HYDROXYZINE HYDROCHLORIDE 50 MG: 50 TABLET, FILM COATED ORAL at 20:49

## 2025-05-05 RX ADMIN — DIVALPROEX SODIUM 500 MG: 500 TABLET, DELAYED RELEASE ORAL at 09:08

## 2025-05-05 RX ADMIN — DIVALPROEX SODIUM 500 MG: 500 TABLET, DELAYED RELEASE ORAL at 20:48

## 2025-05-05 RX ADMIN — ARIPIPRAZOLE 5 MG: 5 TABLET ORAL at 09:08

## 2025-05-05 RX ADMIN — HALOPERIDOL 5 MG: 5 TABLET ORAL at 17:44

## 2025-05-05 NOTE — PROGRESS NOTES
Patient denies suicidal ideation, homicidal ideations and AVH.  Patient states he is feeling \"fine.\"  Patient rates anxiety and depression 4-5 out of 10 due to \"being here.\"  Patient appears flat, sad, anxious, depressed, hopeless/helpless and worried with poor eye contact.  Patient appears guarded, preoccupied and withdrawn.  Presents calm and cooperative during assessment.  Patient is out on the unit but keeps to self and does not appear to be social with peers.  Medications taken without issue.  No complaints or concerns verbalized at this time.  No unit problems reported.  Will continue to observe and support.

## 2025-05-05 NOTE — GROUP NOTE
Group Therapy Note    Date: 5/5/2025    Group Start Time: 1540  Group End Time: 1600  Group Topic: Focus Group    [facility]    Jarod Weston RN        Group Therapy Note    Attendees: 13       Patient's Goal:  To dissect the meaning of selected quotes.       Status After Intervention:  Improved    Participation Level: Active Listener    Participation Quality: Appropriate      Speech:  normal      Thought Process/Content: Logical      Affective Functioning: Congruent      Mood: Cooperative.       Level of consciousness:  Alert      Response to Learning: Able to verbalize current knowledge/experience      Endings: None Reported    Modes of Intervention: Education      Discipline Responsible: Registered Nurse      Signature:  Jarod Weston RN

## 2025-05-05 NOTE — BH NOTE
Spoke with nurse at [facility] in [place] and she stated patient last had their Aristada 882 injection on 4/3/25 and was due for their next injection on 5/1/25.

## 2025-05-05 NOTE — GROUP NOTE
Group Therapy Note    Date: 5/5/2025    Group Start Time: 0930  Group End Time: 1000  Group Topic: Community Meeting    [facility]    Lety Hernandez CTRS    Group Therapy Note    Attendees: 15    Date: 5/5/2025  Start Time: 0930  End Time:  1000  Number of Participants: 15    Type of Group: Community Meeting    Patient's Goal:  Increased awareness of expectations of the milieu, daily staffing and programming. Identified goal for the day.    Notes:  Patient was an active listener in group. Patient shared goal for the day as, \"Be more positive.\"    Status After Intervention:  Improved    Participation Level: Active Listener and Interactive    Participation Quality: Appropriate, Attentive, and Sharing      Speech:  normal      Thought Process/Content: Logical  Linear      Affective Functioning: Congruent      Mood:  Appropriate      Level of consciousness:  Alert and Attentive      Response to Learning: Able to verbalize current knowledge/experience, Able to verbalize/acknowledge new learning, Able to retain information, Capable of insight, Able to change behavior, and Progressing to goal      Endings: None Reported    Modes of Intervention: Education, Support, Socialization, Exploration, Clarifying, and Problem-solving      Discipline Responsible: Psychoeducational Specialist      Signature:  JAE Jones

## 2025-05-05 NOTE — BH NOTE
Patient denies any SI/HI/AVH at this time but stated that his SI is fleeting. Patient stated that his anxiety is 3/10 because he is worried about how things will turn out when he is discharged. Patient stated that he has also been arguing with his dad which has been causing increased anxiety and stress. Patient was complaining of feeling dizzt this morning and BP was taken and was WNL. Patient was encouraged to lay down and increase fluids. Patient has been compliant with medications and has been attending groups. Patient is flat, sad, worried. Will continue to observe patient.

## 2025-05-05 NOTE — PROGRESS NOTES
BEHAVIORAL HEALTH FOLLOW-UP NOTE     5/5/2025     Patient was seen and examined in person, Chart reviewed   Patient's case discussed with staff/team    Chief Complaint: Suicidal ideations    Interim History:   Patient seen today with the treatment team.  He states he feels \"lightheaded.\"  The patient's vital signs appear to be stable and patient is encouraged to continue to drink adequate amounts of water.  He denies suicidal or homicidal ideations intent or plan denies auditory or visual hallucinations he is medication compliant.  Affect is flat blunted does appear guarded and preoccupied he is out in the milieu today attending groups.  Tends to keep himself on the unit not socializing with peers        Appetite: [x] Normal/Unchanged  [] Increased  [] Decreased      Sleep:       [x] Normal/Unchanged  [] Fair       [] Poor              Energy:    [x] Normal/Unchanged  [] Increased  [] Decreased        SI [] Present  [x] Absent    HI  []Present  [x] Absent     Aggression:  [] yes  [x] no    Patient is [x] able  [] unable to CONTRACT FOR SAFETY     PAST MEDICAL/PSYCHIATRIC HISTORY:   Past Medical History:   Diagnosis Date    ADD (attention deficit disorder with hyperactivity)     Anxiety     Cerebral hemorrhage (HCC)     Cerebral palsy (HCC)     Depression     Dyskinesia     Schizophrenia (HCC)        FAMILY/SOCIAL HISTORY:  Family History   Problem Relation Age of Onset    Mental Illness Mother     High Blood Pressure Father     Suicide Brother     Mental Illness Brother      Social History     Socioeconomic History    Marital status: Single     Spouse name: Not on file    Number of children: 0    Years of education: 13    Highest education level: High school graduate   Occupational History    Occupation: DISABLED     Employer: UNEMPLOYED   Tobacco Use    Smoking status: Every Day     Current packs/day: 0.50     Average packs/day: 0.5 packs/day for 31.9 years (15.9 ttl pk-yrs)     Types: Cigarettes     Start date:

## 2025-05-05 NOTE — CARE COORDINATION
Pt was seen during treatment team. Pt reports feeling fine but lightheaded today. Pt denied SI/HI/AVH. Pt cooperative, flat, withdrawn, guarded, poor eye contact.     SW contacted [facility] [phone] and scheduled a follow up appointment on 5/15.

## 2025-05-05 NOTE — PLAN OF CARE
Problem: Depression  Goal: Will be euthymic at discharge  Description: INTERVENTIONS:1. Administer medication as ordered2. Provide emotional support via 1:1 interaction with staff3. Encourage involvement in milieu/groups/activities4. Monitor for social isolation  5/5/2025 0926 by Mary Alice Yarbrough RN  Outcome: Progressing  5/4/2025 2101 by Zoey Alex RN  Outcome: Progressing     Problem: Anxiety  Goal: Will report anxiety at manageable levels  Description: INTERVENTIONS:1. Administer medication as ordered2. Teach and rehearse alternative coping skills3. Provide emotional support with 1:1 interaction with staff  5/5/2025 0926 by Mary Alice Yarbrough RN  Outcome: Progressing  5/4/2025 2101 by Zoey Alex RN  Outcome: Progressing

## 2025-05-05 NOTE — GROUP NOTE
Group Therapy Note    Date: 5/5/2025    Group Start Time: 1000  Group End Time: 1030  Group Topic: Psychoeducation    [facility]    Lety Hernandez CTRS    Group Therapy Note    Attendees: 14    Date: 5/5/2025  Start Time: 1000  End Time:  1030  Number of Participants: 14    Type of Group: Psychoeducation    Name:  Self-Esteem    Patient's Goal:  Identified what is self-esteem, how self-esteem affects mental health and healthy ways to improve self-esteem.    Notes:  CTRS led educational group discussion on self-esteem. Encouraged patients to share their experiences. Patient was actively engaged in group discussion and made positive responses.    Status After Intervention:  Improved    Participation Level: Active Listener and Interactive    Participation Quality: Appropriate, Attentive, and Sharing      Speech:  normal      Thought Process/Content: Logical  Linear      Affective Functioning: Congruent      Mood:  Appropriate      Level of consciousness:  Alert and Attentive      Response to Learning: Able to verbalize current knowledge/experience, Able to verbalize/acknowledge new learning, Able to retain information, Capable of insight, Able to change behavior, and Progressing to goal      Endings: None Reported    Modes of Intervention: Education, Support, Socialization, Exploration, Clarifying, and Problem-solving      Discipline Responsible: Psychoeducational Specialist      Signature:  JAE Jones
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
## 2025-05-05 NOTE — PLAN OF CARE
Problem: Risk for Elopement  Goal: Patient will not exit the unit/facility without proper excort  5/4/2025 2101 by Zoey Alex RN  Outcome: Progressing  5/4/2025 1621 by Lydia San RN  Outcome: Progressing     Problem: Self Harm/Suicidality  Goal: Will have no self-injury during hospital stay  Description: INTERVENTIONS:1.  Ensure constant observer at bedside with Q15M safety checks2.  Maintain a safe environment3.  Secure patient belongings4.  Ensure family/visitors adhere to safety recommendations5.  Ensure safety tray has been added to patient's diet order6.  Every shift and PRN: Re-assess suicidal risk via Frequent Screener  5/4/2025 2101 by Zoey Alex RN  Outcome: Progressing  5/4/2025 1621 by Lydia San RN  Outcome: Progressing     Problem: Depression  Goal: Will be euthymic at discharge  Description: INTERVENTIONS:1. Administer medication as ordered2. Provide emotional support via 1:1 interaction with staff3. Encourage involvement in milieu/groups/activities4. Monitor for social isolation  5/4/2025 2101 by Zoey Alex RN  Outcome: Progressing  5/4/2025 1621 by Lydia San RN  Outcome: Progressing     Problem: Anxiety  Goal: Will report anxiety at manageable levels  Description: INTERVENTIONS:1. Administer medication as ordered2. Teach and rehearse alternative coping skills3. Provide emotional support with 1:1 interaction with staff  5/4/2025 2101 by Zoey Alex RN  Outcome: Progressing  5/4/2025 1621 by Lydia San RN  Outcome: Progressing     Problem: Sleep Disturbance  Goal: Will exhibit normal sleeping pattern  Description: INTERVENTIONS:1. Administer medication as ordered2. Decrease environmental stimuli, including noise, as appropriate3. Discourage social isolation and naps during the day  5/4/2025 2101 by Zoey Alex RN  Outcome: Progressing  5/4/2025 1621 by Lydia San RN  Outcome: Progressing     Problem: Involuntary Admit  Goal: Will cooperate with

## 2025-05-05 NOTE — BH NOTE
[facility] [place]  Day 3 Interdisciplinary Treatment Plan NOTE    Review Date & Time: 05/05/25 1000    Patient was in treatment team    Estimated Length of Stay Update:  3-5 days  Estimated Discharge Date Update: 5/9/25    EDUCATION:   Learner Progress Toward Treatment Goals: Reviewed results and recommendations of this team    Method: Small group    Outcome: Verbalized understanding      PLAN/TREATMENT RECOMMENDATIONS UPDATE:Continue to monitor patient progress    GOALS UPDATE:   Time frame for Short-Term Goals: daily reassessment      Mary Alice Yarbrough RN

## 2025-05-06 PROCEDURE — 1240000000 HC EMOTIONAL WELLNESS R&B

## 2025-05-06 PROCEDURE — 6370000000 HC RX 637 (ALT 250 FOR IP): Performed by: NURSE PRACTITIONER

## 2025-05-06 PROCEDURE — 6370000000 HC RX 637 (ALT 250 FOR IP): Performed by: PSYCHIATRY & NEUROLOGY

## 2025-05-06 PROCEDURE — 99232 SBSQ HOSP IP/OBS MODERATE 35: CPT | Performed by: NURSE PRACTITIONER

## 2025-05-06 RX ADMIN — ACETAMINOPHEN 650 MG: 325 TABLET ORAL at 21:24

## 2025-05-06 RX ADMIN — ARIPIPRAZOLE 10 MG: 10 TABLET ORAL at 08:54

## 2025-05-06 RX ADMIN — DIVALPROEX SODIUM 500 MG: 500 TABLET, DELAYED RELEASE ORAL at 21:24

## 2025-05-06 RX ADMIN — DIVALPROEX SODIUM 500 MG: 500 TABLET, DELAYED RELEASE ORAL at 08:54

## 2025-05-06 RX ADMIN — ACETAMINOPHEN 650 MG: 325 TABLET ORAL at 10:43

## 2025-05-06 RX ADMIN — Medication 3 MG: at 21:24

## 2025-05-06 RX ADMIN — HYDROXYZINE HYDROCHLORIDE 50 MG: 50 TABLET, FILM COATED ORAL at 21:24

## 2025-05-06 ASSESSMENT — PAIN DESCRIPTION - LOCATION
LOCATION: HEAD
LOCATION: HEAD

## 2025-05-06 ASSESSMENT — PAIN DESCRIPTION - ORIENTATION: ORIENTATION: RIGHT

## 2025-05-06 ASSESSMENT — PAIN DESCRIPTION - DESCRIPTORS
DESCRIPTORS: ACHING
DESCRIPTORS: ACHING

## 2025-05-06 ASSESSMENT — PAIN SCALES - GENERAL
PAINLEVEL_OUTOF10: 4
PAINLEVEL_OUTOF10: 4

## 2025-05-06 NOTE — GROUP NOTE
Group Therapy Note    Date: 5/6/2025    Group Start Time: 0955  Group End Time: 1025  Group Topic: Psychoeducation    [facility]    Lety Hernandez CTRS    Group Therapy Note    Attendees: 18    Date: 5/6/2025  Start Time: 0955  End Time:  1025  Number of Participants: 18    Type of Group: Psychoeducation    Name:  Social Support    Patient's Goal:  Identified what is social support, types of social support, how social supports can help mental health and ways to improve/maintain social support.    Notes:  CTRS led educational group discussion on social support. Encouraged patients to share their experiences. Patient was actively engaged in group discussion and made positive responses.    Status After Intervention:  Improved    Participation Level: Active Listener and Interactive    Participation Quality: Appropriate, Attentive, and Sharing      Speech:  normal      Thought Process/Content: Logical  Linear      Affective Functioning: Blunted      Mood:  depressed      Level of consciousness:  Alert and Attentive      Response to Learning: Able to verbalize current knowledge/experience, Able to verbalize/acknowledge new learning, Able to retain information, Capable of insight, Able to change behavior, and Progressing to goal      Endings: None Reported    Modes of Intervention: Education, Support, Socialization, Exploration, Clarifying, and Problem-solving      Discipline Responsible: Psychoeducational Specialist      Signature:  JAE Jones
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
## 2025-05-06 NOTE — PLAN OF CARE
Problem: Risk for Elopement  Goal: Patient will not exit the unit/facility without proper excort  Outcome: Progressing     Problem: Self Harm/Suicidality  Goal: Will have no self-injury during hospital stay  Description: INTERVENTIONS:1.  Ensure constant observer at bedside with Q15M safety checks2.  Maintain a safe environment3.  Secure patient belongings4.  Ensure family/visitors adhere to safety recommendations5.  Ensure safety tray has been added to patient's diet order6.  Every shift and PRN: Re-assess suicidal risk via Frequent Screener  Outcome: Progressing     Problem: Depression  Goal: Will be euthymic at discharge  Description: INTERVENTIONS:1. Administer medication as ordered2. Provide emotional support via 1:1 interaction with staff3. Encourage involvement in milieu/groups/activities4. Monitor for social isolation  5/5/2025 2101 by Zoey Alex RN  Outcome: Progressing  5/5/2025 0926 by Mary Alice Yarbrough RN  Outcome: Progressing     Problem: Anxiety  Goal: Will report anxiety at manageable levels  Description: INTERVENTIONS:1. Administer medication as ordered2. Teach and rehearse alternative coping skills3. Provide emotional support with 1:1 interaction with staff  5/5/2025 2101 by Zoey Alex RN  Outcome: Progressing  5/5/2025 0926 by Mary Alice Yarbrough RN  Outcome: Progressing     Problem: Sleep Disturbance  Goal: Will exhibit normal sleeping pattern  Description: INTERVENTIONS:1. Administer medication as ordered2. Decrease environmental stimuli, including noise, as appropriate3. Discourage social isolation and naps during the day  Outcome: Progressing     Problem: Involuntary Admit  Goal: Will cooperate with staff recommendations and doctor's orders and will demonstrate appropriate behavior  Description: INTERVENTIONS:1. Treat underlying conditions and offer medication as ordered2. Educate regarding involuntary admission procedures and rules3. Contain excessive/inappropriate behavior per unit

## 2025-05-06 NOTE — GROUP NOTE
Group Therapy Note    Date: 5/6/2025    Group Start Time: 0930  Group End Time: 0955  Group Topic: Community Meeting    [facility]    Lety Hernandez CTRS    Group Therapy Note    Attendees: 15    Date: 5/6/2025  Start Time: 0930  End Time:  0955  Number of Participants: 15    Type of Group: Community Meeting    Patient's Goal:  Increased awareness of expectations of the milieu, daily staffing and programming. Identified goal for the day.    Notes:  Patient was an active listener in group. Patient shared goal for the day as, \"Stay positive.\"    Status After Intervention:  Improved    Participation Level: Active Listener and Interactive    Participation Quality: Appropriate, Attentive, and Sharing      Speech:  normal      Thought Process/Content: Logical  Linear      Affective Functioning: Blunted      Mood:  depressed      Level of consciousness:  Alert and Attentive      Response to Learning: Able to verbalize current knowledge/experience, Able to verbalize/acknowledge new learning, Able to retain information, Capable of insight, Able to change behavior, and Progressing to goal      Endings: None Reported    Modes of Intervention: Education, Support, Socialization, Exploration, Clarifying, and Problem-solving      Discipline Responsible: Psychoeducational Specialist      Signature:  JAE Jones

## 2025-05-06 NOTE — PROGRESS NOTES
Patient denies suicidal ideation, homicidal ideations and AVH.  Patient denies anxiety and depression.  Patient states he is feeling \"good.\"  Patient appears flat, sad, anxious, depressed, worried with blunt responses and fair eye contact.  Patient appears guarded, paranoid, preoccupied and withdrawn.  Presents calm and cooperative during assessment.  Patient is out on the unit intermittently and is social with select peers.  Medications taken without issue.  No complaints or concerns verbalized at this time.  No unit problems reported.  Will continue to observe and support.

## 2025-05-06 NOTE — PROGRESS NOTES
Spiritual Health History and Assessment/Progress Note  [facility] [place]    (P) Spiritual/Emotional Needs,  ,  ,      Name: Jaden Savage III MRN: [id number]    Age: 30 y.o.     Sex: male   Language: English   Jewish: Jainism   Bipolar 1 disorder, depressed (HCC)     Date: 5/6/2025                           Spiritual Assessment began in [facility]        Referral/Consult From: (P) Nursing Supervisor/Manager   Encounter Overview/Reason: (P) Spiritual/Emotional Needs  Service Provided For: (P) Patient    Luz Maria, Belief, Meaning:   Patient has beliefs or practices that help with coping during difficult times  Family/Friends No family/friends present      Importance and Influence:  Patient has no beliefs influential to healthcare decision-making identified during this visit  Family/Friends No family/friends present    Community:  Patient feels well-supported. Support system includes: Parent/s  Family/Friends No family/friends present    Assessment and Plan of Care:     Patient Interventions include: Facilitated expression of thoughts and feelings  Family/Friends Interventions include: Facilitated expression of thoughts and feelings    Patient Plan of Care: Spiritual Care available upon further referral  Family/Friends Plan of Care: No family/friends present    Electronically signed by Chaplain Brett on 5/6/2025 at 5:23 PM

## 2025-05-06 NOTE — BH NOTE
Patient denies any SI/HI/AVH at this time. Patient denies any anxiety or depression at this time. Patient has been calm and cooperative and friendly. Patient is social with select on the unit. Patient still appears anxious and flat at times. Patient has been compliant with medications and has been attending groups. Will continue to observe patient.

## 2025-05-07 PROCEDURE — 1240000000 HC EMOTIONAL WELLNESS R&B

## 2025-05-07 PROCEDURE — 6370000000 HC RX 637 (ALT 250 FOR IP): Performed by: PSYCHIATRY & NEUROLOGY

## 2025-05-07 PROCEDURE — 99232 SBSQ HOSP IP/OBS MODERATE 35: CPT | Performed by: NURSE PRACTITIONER

## 2025-05-07 PROCEDURE — 6370000000 HC RX 637 (ALT 250 FOR IP): Performed by: NURSE PRACTITIONER

## 2025-05-07 RX ADMIN — ARIPIPRAZOLE 10 MG: 10 TABLET ORAL at 08:28

## 2025-05-07 RX ADMIN — DIVALPROEX SODIUM 500 MG: 500 TABLET, DELAYED RELEASE ORAL at 08:28

## 2025-05-07 RX ADMIN — ACETAMINOPHEN 650 MG: 325 TABLET ORAL at 09:28

## 2025-05-07 RX ADMIN — DIVALPROEX SODIUM 500 MG: 500 TABLET, DELAYED RELEASE ORAL at 21:29

## 2025-05-07 RX ADMIN — Medication 3 MG: at 21:29

## 2025-05-07 RX ADMIN — HYDROXYZINE HYDROCHLORIDE 50 MG: 50 TABLET, FILM COATED ORAL at 21:29

## 2025-05-07 ASSESSMENT — PAIN DESCRIPTION - ORIENTATION: ORIENTATION: RIGHT

## 2025-05-07 ASSESSMENT — PAIN DESCRIPTION - LOCATION: LOCATION: HEAD

## 2025-05-07 ASSESSMENT — PAIN - FUNCTIONAL ASSESSMENT: PAIN_FUNCTIONAL_ASSESSMENT: ACTIVITIES ARE NOT PREVENTED

## 2025-05-07 ASSESSMENT — PAIN SCALES - GENERAL
PAINLEVEL_OUTOF10: 0
PAINLEVEL_OUTOF10: 5
PAINLEVEL_OUTOF10: 0

## 2025-05-07 ASSESSMENT — PAIN DESCRIPTION - DESCRIPTORS: DESCRIPTORS: ACHING;DISCOMFORT;DULL

## 2025-05-07 NOTE — GROUP NOTE
Shared goal for the day as to stay positive.                                                                       Group Therapy Note    Date: 5/7/2025    Group Start Time: 0935  Group End Time: 0950  Group Topic: Community Meeting    [facility]  1    Lisa Turpin, JAE    Type of Group: Community Meeting      Patient's Goal:  Patient will be able to id staffing assignments, expectations of patients, and general information re: floor rules. Will be prompted to share goal for the day.     Notes:  Patient appeared to be an active listener, taking in information presented and was prompted to share goal for the day.    Status After Intervention:  Improved    Participation Level: Active Listener and Interactive    Participation Quality: Appropriate, Attentive, and Sharing      Speech:  normal      Thought Process/Content: Logical      Affective Functioning: Congruent      Mood: euthymic      Level of consciousness:  Alert, Oriented x4, and Attentive      Response to Learning: Able to verbalize/acknowledge new learning, Able to retain information, and Progressing to goal      Endings: None Reported    Modes of Intervention: Support, Socialization, Exploration, and Problem-solving      Discipline Responsible: Psychoeducational Specialist      Signature:  JAE Gutiérrez

## 2025-05-07 NOTE — CARE COORDINATION
Pt was seen during treatment team. Pt states that he is doing good, that he plans to get his long acting injection today. He denied SI/HI/hallucinations. He states that things with his dad are going better. He states that he wants to transition to female, but that his father would not support this, so he has not told his father. He has been talking with his mother and friend about this and friend is willing to let pt live with friend so that pt can feel supported. Pt states that he wants to get his 's license before he moves in with friend so that he can have more freedom. Pt states that he has known he has wanted to be female since he was under 9yo, but he kept this pent up inside for fear that others would not accept him. He states that he now feels relieved after opening up about his true identity. He denied questions or concerns for treatment team at this time.

## 2025-05-07 NOTE — PLAN OF CARE
Problem: Self Harm/Suicidality  Goal: Will have no self-injury during hospital stay  Description: INTERVENTIONS:1.  Ensure constant observer at bedside with Q15M safety checks2.  Maintain a safe environment3.  Secure patient belongings4.  Ensure family/visitors adhere to safety recommendations5.  Ensure safety tray has been added to patient's diet order6.  Every shift and PRN: Re-assess suicidal risk via Frequent Screener  Outcome: Progressing     Problem: Depression  Goal: Will be euthymic at discharge  Description: INTERVENTIONS:1. Administer medication as ordered2. Provide emotional support via 1:1 interaction with staff3. Encourage involvement in milieu/groups/activities4. Monitor for social isolation  Outcome: Progressing     Problem: Anxiety  Goal: Will report anxiety at manageable levels  Description: INTERVENTIONS:1. Administer medication as ordered2. Teach and rehearse alternative coping skills3. Provide emotional support with 1:1 interaction with staff  Outcome: Progressing     Pt denies SI, HI and AVH. Pt out on the unit. Attends groups Medications compliant. Pt presents flat. Calm and cooperative. Pt stated \"I'm feeling better.\" Will continue to monitor.

## 2025-05-07 NOTE — PLAN OF CARE
[facility]  Day 3 Interdisciplinary Treatment Plan NOTE    Review Date & Time: 5/7/25 0930    Admission Type:   Admission Type: Involuntary    Reason for admission:  Reason for Admission: \"I've been feeling suicidal, hopeless, angry, frustrated and lashing out and arguing with my Dad a lot\"  Estimated Length of Stay: 5-7 days  Estimated Discharge Date Update: to be determined by physician    PATIENT STRENGTHS:  Patient Strengths    Patient Strengths and Limitations:Limitations: External locus of control, Demonstrates discomfort with /lack of social skills, Multiple barriers to leisure interests, Difficult relationships / poor social skills, Unrealistic self-view, Difficulty problem solving/relies on others to help solve problems  Addictive Behavior:Addictive Behavior  In the Past 3 Months, Have You Felt or Has Someone Told You That You Have a Problem With  : None  Medical Problems:  Past Medical History:   Diagnosis Date    ADD (attention deficit disorder with hyperactivity)     Anxiety     Cerebral hemorrhage (HCC)     Cerebral palsy (HCC)     Depression     Dyskinesia     Schizophrenia (HCC)        Risk:  Fall Risk   Jamison Scale Jamison Scale Score: 22  BVC    Change in scores no Changes to plan of Care no    Status EXAM:   Mental Status and Behavioral Exam  Normal: No  Level of Assistance: Independent/Self  Facial Expression: Brightened  Affect: Blunt  Level of Consciousness: Alert  Frequency of Checks: 4 times per hour, close  Mood:Normal: No  Mood: Depressed, Anxious, Sad  Motor Activity:Normal: Yes  Eye Contact: Fair  Observed Behavior: Cooperative, Friendly  Sexual Misconduct History: Current - no  Preception: [place] to person, [place] to place, [place] to time, [place] to situation  Attention:Normal: Yes  Thought Processes: Circumstantial  Thought Content:Normal: No  Thought Content: Preoccupations  Depression Symptoms: No problems reported or observed.  Anxiety Symptoms: Generalized  Breana  none... Patient/surrogate refused vaccine...

## 2025-05-07 NOTE — GROUP NOTE
Group Therapy Note    Date: 5/7/2025    Group Start Time: 0950  Group End Time: 1025  Group Topic: Psychoeducation    [facility]    Lisa Turpin, SUSANS    Type of Group: Psychoeducation    Module Name:  processing change     Patient's Goal:  pt will be able to id steps to manage processing big changes.     Notes:  pt able to verbally participate in group conversation and appeared to be an active listener in group.   Status After Intervention:  Improved    Participation Level: Active Listener and Interactive    Participation Quality: Appropriate, Attentive, and Sharing      Speech:  normal      Thought Process/Content: Logical      Affective Functioning: Congruent      Mood: euthymic      Level of consciousness:  Alert, Oriented x4, and Attentive      Response to Learning: Able to verbalize/acknowledge new learning, Able to retain information, and Progressing to goal      Endings: None Reported    Modes of Intervention: Support, Socialization, and Exploration      Discipline Responsible: Psychoeducational Specialist      Signature:  Lisa Turpin, JAE

## 2025-05-07 NOTE — GROUP NOTE
Group Therapy Note    Date: 5/7/2025    Group Start Time: 1050  Group End Time: 1130  Group Topic: Process Group - Inpatient    [facility]    Benito Reina LISW        Group Therapy Note    Attendees: 14         Patient's Goal:  Pt attended group therapy where we had a process group and discussed different topics that were causing the patients concern.    Notes:  Pt was an active participant in group.    Status After Intervention:  Improved    Participation Level: Active Listener and Interactive    Participation Quality: Appropriate, Attentive, and Sharing      Speech:  normal      Thought Process/Content: Logical      Affective Functioning: Congruent      Mood: euthymic      Level of consciousness:  Drowsy      Response to Learning: Able to verbalize current knowledge/experience and Able to retain information      Endings: None Reported    Modes of Intervention: Education, Support, Socialization, Exploration, Clarifying, and Problem-solving      Discipline Responsible: /Counselor      Signature:  YOLANDE Pitts

## 2025-05-07 NOTE — PROGRESS NOTES
BEHAVIORAL HEALTH FOLLOW-UP NOTE     5/7/2025     Patient was seen and examined in person, Chart reviewed   Patient's case discussed with staff/team    Chief Complaint: Suicidal ideations    Interim History:   Patient seen this morning in his room with treatment team.  He states he is been talking to his mom's mom supportive.  He is happy to get back on his long-acting injection.  He denies suicidal or homicidal ideations intent or plan denies auditory or visual hallucinations states his fight with his dad was over \"stupid stuff.\"  He states mom supports his decision to transition but that mom agrees not to tell his father because his father tends to be homophobic.  He states this is something he has known since he was very young but did not want to tell anyone because he is afraid he would be accepted.  He states he has a friend who is supportive of his transitioning and he is thinking of moving out to live with this friend that way he can transition to female.  He states this is the one of the reasons that he is working towards getting his 's license.  He states he has not picked a female name yet.  He is out visible in the milieu attending group social slip peers.  Eating well sleeping well and no neurovegetative signs or symptoms of depression    Appetite: [x] Normal/Unchanged  [] Increased  [] Decreased      Sleep:       [x] Normal/Unchanged  [] Fair       [] Poor              Energy:    [x] Normal/Unchanged  [] Increased  [] Decreased        SI [] Present  [x] Absent    HI  []Present  [x] Absent     Aggression:  [] yes  [x] no    Patient is [x] able  [] unable to CONTRACT FOR SAFETY     PAST MEDICAL/PSYCHIATRIC HISTORY:   Past Medical History:   Diagnosis Date    ADD (attention deficit disorder with hyperactivity)     Anxiety     Cerebral hemorrhage (HCC)     Cerebral palsy (HCC)     Depression     Dyskinesia     Schizophrenia (HCC)        FAMILY/SOCIAL HISTORY:  Family History   Problem Relation Age of

## 2025-05-07 NOTE — GROUP NOTE
Group Therapy Note    Date: 5/7/2025    Group Start Time: 1400  Group End Time: 1500  Group Topic: Recovery    [facility]    Lisa Turpin CTRS; Sharon Pratt        Type of Group: Recovery    Patient's Goal:  Pt will be able to id local resources available to pts suffering from dereck.     Notes:  Pt appeared actively engaged in group.     Status After Intervention:  Improved    Participation Level: Active Listener and Interactive    Participation Quality: Appropriate and Attentive      Speech:  normal      Thought Process/Content: Logical      Affective Functioning: Congruent      Mood: euthymic      Level of consciousness:  Alert, Oriented x4, and Attentive      Response to Learning: Able to verbalize/acknowledge new learning and Able to retain information      Endings: None Reported    Modes of Intervention: Education, Support, and Clarifying      Discipline Responsible: Psychoeducational Specialist      Signature:  JAE Gutiérrez

## 2025-05-08 VITALS
TEMPERATURE: 98.9 F | RESPIRATION RATE: 16 BRPM | OXYGEN SATURATION: 97 % | DIASTOLIC BLOOD PRESSURE: 69 MMHG | HEIGHT: 68 IN | SYSTOLIC BLOOD PRESSURE: 112 MMHG | HEART RATE: 91 BPM | BODY MASS INDEX: 24.25 KG/M2 | WEIGHT: 160 LBS

## 2025-05-08 LAB
DATE LAST DOSE: NORMAL
TME LAST DOSE: NORMAL H
VALPROATE SERPL-MCNC: 63 UG/ML (ref 50–100)
VANCOMYCIN DOSE: NORMAL MG

## 2025-05-08 PROCEDURE — 99239 HOSP IP/OBS DSCHRG MGMT >30: CPT | Performed by: NURSE PRACTITIONER

## 2025-05-08 PROCEDURE — 80164 ASSAY DIPROPYLACETIC ACD TOT: CPT

## 2025-05-08 PROCEDURE — 36415 COLL VENOUS BLD VENIPUNCTURE: CPT

## 2025-05-08 PROCEDURE — 6370000000 HC RX 637 (ALT 250 FOR IP): Performed by: NURSE PRACTITIONER

## 2025-05-08 RX ORDER — ARIPIPRAZOLE 10 MG/1
10 TABLET ORAL DAILY
Qty: 30 TABLET | Refills: 0 | Status: SHIPPED | OUTPATIENT
Start: 2025-05-09 | End: 2025-06-08

## 2025-05-08 RX ORDER — DIVALPROEX SODIUM 500 MG/1
500 TABLET, DELAYED RELEASE ORAL EVERY 12 HOURS SCHEDULED
Qty: 60 TABLET | Refills: 0 | Status: SHIPPED | OUTPATIENT
Start: 2025-05-08 | End: 2025-06-07

## 2025-05-08 RX ADMIN — DIVALPROEX SODIUM 500 MG: 500 TABLET, DELAYED RELEASE ORAL at 08:37

## 2025-05-08 RX ADMIN — ARIPIPRAZOLE 10 MG: 10 TABLET ORAL at 08:37

## 2025-05-08 NOTE — PROGRESS NOTES
CLINICAL PHARMACY NOTE: MEDS TO BEDS    Total # of Prescriptions Filled: 2   The following medications were delivered to the patient:  Abilify 10mg tabs  Depakote 500mg tabs    Additional Documentation:  To Albina Greer

## 2025-05-08 NOTE — GROUP NOTE
Group Therapy Note    Date: 5/8/2025    Group Start Time: 1050  Group End Time: 1135  Group Topic: Cognitive Skills    [facility]    Benito Reina LISW        Group Therapy Note    Attendees: 14       Patient's Goal:  Pt attended group therapy where we read the poem, \"A Prayer For the Overwhelmed,\" and discussed trauma - what it is, symptoms of and treatments available.    Notes:  Pt was an active participant in group.    Status After Intervention:  Improved    Participation Level: Active Listener and Interactive    Participation Quality: Appropriate, Attentive, and Sharing      Speech:  normal      Thought Process/Content: Logical      Affective Functioning: Congruent      Mood: euthymic      Level of consciousness:  Alert, Oriented x4, and Attentive      Response to Learning: Able to verbalize current knowledge/experience and Able to retain information      Endings: None Reported    Modes of Intervention: Education, Support, Socialization, Exploration, Clarifying, and Problem-solving      Discipline Responsible: /Counselor      Signature:  YOLANDE Pitts

## 2025-05-08 NOTE — CARE COORDINATION
SW and NP met with pt. Pt found in common area. He is calm and cooperative with good eye contact. Pt thoughts are logical and linear, future oriented. Pt states that he feels ready to discharge home today, is hopeful provider will agree to a discharge. He denied SI/HI/hallucinations. He states that he plans to stay on medications after discharge and follow up with his outpatient provider. He states that he is safe to discharge. He states that he plans to keep space in between him and his father as conflict often arises between them and it is best for pt to set boundaries with dad. He states that he is looking forward to studying for his permit test to hopefully get his 's license int he near future. He states that he also plans to stay with a supportive friend in the future who is aware of his gender identity and will support him through this process. He plans to return home with parents at discharge today. He states that he may need a [employer] ride home today if support system can't transport. Pt denied further questions or concerns at this time.     CLAUDE called [facility] [phone] and left voicemail for nurse Rodríguez, informed them of pt ARIPiprazole lauroxil (ARISTADA) injection 882 mg due 6/4. CLAUDE advised they call back with questions/concerns.     CLAUDE called pt father Bill [phone] (WEI signed) to discuss pt discharge. He states that no one is able to transport pt home today and pt will need insurance ride home. He denied concerns for pt discharge.     In order to ensure appropriate transition and discharge planning is in place, the following documents have been transmitted to [facility], as the new outpatient provider:    The d/c diagnosis was transmitted to the next care provider  The reason for hospitalization was transmitted to the next care provider  The d/c medications (dosage and indication) were transmitted to the next care provider   The continuing care plan was transmitted to the next care

## 2025-05-08 NOTE — CARE COORDINATION
SW received call from nurse Rodríguez at [facility] who states she received SW message and is aware of pt long acting injection.

## 2025-05-08 NOTE — PLAN OF CARE
Problem: Self Harm/Suicidality  Goal: Will have no self-injury during hospital stay  Description: INTERVENTIONS:1.  Ensure constant observer at bedside with Q15M safety checks2.  Maintain a safe environment3.  Secure patient belongings4.  Ensure family/visitors adhere to safety recommendations5.  Ensure safety tray has been added to patient's diet order6.  Every shift and PRN: Re-assess suicidal risk via Frequent Screener  Outcome: Progressing     Problem: Depression  Goal: Will be euthymic at discharge  Description: INTERVENTIONS:1. Administer medication as ordered2. Provide emotional support via 1:1 interaction with staff3. Encourage involvement in milieu/groups/activities4. Monitor for social isolation  Outcome: Progressing     Problem: Anxiety  Goal: Will report anxiety at manageable levels  Description: INTERVENTIONS:1. Administer medication as ordered2. Teach and rehearse alternative coping skills3. Provide emotional support with 1:1 interaction with staff  Outcome: Progressing       Pt denies SI, HI and AVH. Pt out on the unit. Medication compliant. Attends groups. Social on the unit. Brightened walking with peers. Will continue to monitor.

## 2025-05-08 NOTE — DISCHARGE SUMMARY
DISCHARGE SUMMARY      Patient ID:  Jaden Savage III  [id number]  30 y.o.  1995    Admit date: 5/3/2025    Discharge date and time: 5/8/2025    Admitting Physician: Trinity Carrion MD     Discharge Physician: Dr Sheyla SEALS    Discharge Diagnoses:   Patient Active Problem List   Diagnosis    Cannabis use disorder, severe, dependence (HCC)    Tobacco use disorder, continuous    Severe recurrent major depression with psychotic features (HCC)    Depression    Gender dysphoria in adult    Suicidal ideation    Depression, major, single episode    Bipolar 1 disorder, manic, moderate (HCC)    Bipolar 1 disorder, depressed (HCC)    Bipolar 1 disorder (HCC)       Admission Condition: poor    Discharged Condition: stable    Admission Circumstance: Jaden Savage is a 29 year old male, with history of bipolar disorder, with past psychiatric hospitalizations, suicide attempts, immediate family history of suicide in brother by hanging,  placing patient at ongoing high risk of suicide, presents to the ER reporting increasing depression, and suicidal ideations with a plan of anyway can harm himself, was placed on an involuntary hold.  Triggering factors include fight with his father and noncompliance with psychotropic medications duration of onset of symptoms just prior to arrival       PAST MEDICAL/PSYCHIATRIC HISTORY:   Past Medical History:   Diagnosis Date    ADD (attention deficit disorder with hyperactivity)     Anxiety     Cerebral hemorrhage (HCC)     Cerebral palsy (HCC)     Depression     Dyskinesia     Schizophrenia (HCC)        FAMILY/SOCIAL HISTORY:  Family History   Problem Relation Age of Onset    Mental Illness Mother     High Blood Pressure Father     Suicide Brother     Mental Illness Brother      Social History     Socioeconomic History    Marital status: Single     Spouse name: Not on file    Number of children: 0    Years of education: 13    Highest education level: High school graduate   Occupational

## 2025-05-08 NOTE — PROGRESS NOTES
[facility]  Discharge Note    Pt discharged with followings belongings:   Dental Appliances: None  Vision - Corrective Lenses: None  Hearing Aid: None  Jewelry: None  Body Piercings Removed: N/A  Clothing: Footwear, Jacket/Coat, Pants, Shirt, Hat, Undergarments  Other Valuables: Credit/Debit Card, Keys, Wallet (one key, insurance card, social security card, and ID)   Valuables sent home with pt or returned to patient. Patient educated on aftercare instructions: yes  Information faxed to n/a by n/a  at 2:32 PM .Patient verbalize understanding of AVS:  yes .    Status EXAM upon discharge:  Mental Status and Behavioral Exam  Normal: No  Level of Assistance: Independent/Self  Facial Expression: Brightened  Affect: Blunt  Level of Consciousness: Alert  Frequency of Checks: 4 times per hour, close  Mood:Normal: No  Mood: Anxious, Depressed, Sad  Motor Activity:Normal: Yes  Eye Contact: Fair  Observed Behavior: Friendly  Sexual Misconduct History: Current - no  Preception: [place] to time, [place] to person, [place] to place, [place] to situation  Attention:Normal: Yes  Thought Processes: Unremarkable  Thought Content:Normal: No  Thought Content: Other (comment) (impoverished, improving)  Depression Symptoms: No problems reported or observed.  Anxiety Symptoms: Generalized  Breana Symptoms: No problems reported or observed.  Hallucinations: None  Delusions: No  Delusions: Other (comment)  Memory:Normal: Yes  Memory: Other (comment)  Insight and Judgment: No  Insight and Judgment: Poor judgment    Tobacco Screening:  Practical Counseling, on admission, graeme X, if applicable and completed (first 3 are required if patient doesn't refuse):            (x ) Recognizing danger situations (included triggers and roadblocks)                    (x ) Coping skills (new ways to manage stress,relaxation techniques, changing routine, distraction)                                                           ( x) Basic information

## 2025-05-08 NOTE — GROUP NOTE
Group Therapy Note    Date: 5/8/2025    Group Start Time: 0955  Group End Time: 1025  Group Topic: Psychoeducation    [facility]    Lety Hernandez CTRS    Group Therapy Note    Attendees: 14    Date: 5/8/2025  Start Time: 0955  End Time:  1025  Number of Participants: 14    Type of Group: Psychoeducation    Name:  Stress and Wellbeing    Patient's Goal:  Identified what is stress, how stress affects the body and healthy ways to cope with stress.    Notes:  CTRS led educational group discussion on stress and wellbeing. Encouraged patients to share their experiences. Patient was actively engaged in group discussion and made positive responses.    Status After Intervention:  Improved    Participation Level: Active Listener and Interactive    Participation Quality: Appropriate, Attentive, and Sharing      Speech:  normal      Thought Process/Content: Logical  Linear      Affective Functioning: Congruent      Mood:  Appropriate      Level of consciousness:  Alert and Attentive      Response to Learning: Able to verbalize current knowledge/experience, Able to verbalize/acknowledge new learning, Able to retain information, Capable of insight, Able to change behavior, and Progressing to goal      Endings: None Reported    Modes of Intervention: Education, Support, Socialization, Exploration, Clarifying, and Problem-solving      Discipline Responsible: Psychoeducational Specialist      Signature:  JAE Jones

## 2025-05-08 NOTE — TRANSITION OF CARE
Behavioral [facility] Transition Record    Patient Name: Jaden Savage III  YOB: 1995   Medical Record Number: [id number]  Date of Admission: 5/3/2025 12:50 PM   Date of Discharge: 5/8/25    Attending Provider: Trinity Carrion MD   Discharging Provider: Dr. Carrion  To contact this individual call [phone] and ask the  to page.  If unavailable, ask to be transferred to Behavioral Health Provider on call.  A Behavioral Health Provider will be available on call 24/7 and during holidays.    Primary Care Provider: Myles Chakraborty DO    No Known Allergies    Reason for Admission: Jaden Savage is a 29 year old male, with history of bipolar disorder, with past psychiatric hospitalizations, suicide attempts, immediate family history of suicide in brother by hanging,  placing patient at ongoing high risk of suicide, presents to the ER reporting increasing depression, and suicidal ideations with a plan of anyway can harm himself, was placed on an involuntary hold.  Triggering factors include fight with his father and noncompliance with psychotropic medications duration of onset of symptoms just prior to arrival     Admission Diagnosis: Suicidal ideation [R45.851]  Mood disorder [F39]    * No surgery found *    Results for orders placed or performed during the hospital encounter of 05/03/25   CBC with Auto Differential   Result Value Ref Range    WBC 8.3 4.5 - 11.5 k/uL    RBC 5.27 3.80 - 5.80 m/uL    Hemoglobin 16.7 (H) 12.5 - 16.5 g/dL    Hematocrit 48.1 37.0 - 54.0 %    MCV 91.3 80.0 - 99.9 fL    MCH 31.7 26.0 - 35.0 pg    MCHC 34.7 (H) 32.0 - 34.5 g/dL    RDW 12.1 11.5 - 15.0 %    Platelets 197 130 - 450 k/uL    MPV 11.1 7.0 - 12.0 fL    Neutrophils % 63 43.0 - 80.0 %    Lymphocytes % 31 20.0 - 42.0 %    Monocytes % 5 2.0 - 12.0 %    Eosinophils % 1 0 - 6 %    Basophils % 0 0.0 - 2.0 %    Immature Granulocytes % 0 0.0 - 5.0 %    Neutrophils Absolute 5.26 1.80 - 7.30 k/uL    Lymphocytes Absolute

## 2025-05-08 NOTE — GROUP NOTE
Group Therapy Note    Date: 5/8/2025    Group Start Time: 0930  Group End Time: 0955  Group Topic: Community Meeting    [facility]    Lety Hernandez [facility]    Group Therapy Note    Attendees: 15    Date: 5/8/2025  Start Time: 0930  End Time:  0955  Number of Participants: 15    Type of Group: Community Meeting    Patient's Goal:  Increased awareness of expectations of the milieu, daily staffing and programming. Identified goal for the day.    Notes:  Patient was an active listener in group. Patient shared goal for the day as, \"Keep my head up.\"    Status After Intervention:  Improved    Participation Level: Active Listener and Interactive    Participation Quality: Appropriate, Attentive, and Sharing      Speech:  normal      Thought Process/Content: Logical  Linear      Affective Functioning: Congruent      Mood:  Appropriate      Level of consciousness:  Alert and Attentive      Response to Learning: Able to verbalize current knowledge/experience, Able to verbalize/acknowledge new learning, Able to retain information, Capable of insight, Able to change behavior, and Progressing to goal      Endings: None Reported    Modes of Intervention: Education, Support, Socialization, Exploration, Clarifying, and Problem-solving      Discipline Responsible: Psychoeducational Specialist      Signature:  JAE Jones

## 2025-05-08 NOTE — CARE COORDINATION
CLAUDE called [facility] [phone] to schedule transportation from [address] [place] [address] to home for pt. CLAUDE advised that the  will need to go to the main entrance on [facility] and call the RN station upon arrival. Trip confirmation number is [id number]

## 2025-07-31 ENCOUNTER — HOSPITAL ENCOUNTER (EMERGENCY)
Age: 30
Discharge: HOME | End: 2025-07-31
Payer: COMMERCIAL

## 2025-07-31 ENCOUNTER — APPOINTMENT (OUTPATIENT)
Dept: CT IMAGING | Age: 30
End: 2025-07-31
Payer: COMMERCIAL

## 2025-07-31 VITALS
RESPIRATION RATE: 17 BRPM | HEART RATE: 70 BPM | WEIGHT: 160 LBS | BODY MASS INDEX: 24.33 KG/M2 | OXYGEN SATURATION: 99 % | SYSTOLIC BLOOD PRESSURE: 118 MMHG | TEMPERATURE: 98.2 F | DIASTOLIC BLOOD PRESSURE: 74 MMHG

## 2025-07-31 DIAGNOSIS — R10.32 LEFT LOWER QUADRANT ABDOMINAL PAIN: Primary | ICD-10-CM

## 2025-07-31 DIAGNOSIS — R21 RASH AND OTHER NONSPECIFIC SKIN ERUPTION: ICD-10-CM

## 2025-07-31 DIAGNOSIS — R63.4 UNINTENDED WEIGHT LOSS: ICD-10-CM

## 2025-07-31 DIAGNOSIS — R11.0 NAUSEA: ICD-10-CM

## 2025-07-31 DIAGNOSIS — K59.00 CONSTIPATION, UNSPECIFIED CONSTIPATION TYPE: ICD-10-CM

## 2025-07-31 DIAGNOSIS — R59.9 ENLARGED LYMPH NODE: ICD-10-CM

## 2025-07-31 LAB
ALBUMIN SERPL-MCNC: 3.9 G/DL (ref 3.5–5.2)
ALP SERPL-CCNC: 56 U/L (ref 40–129)
ALT SERPL-CCNC: 14 U/L (ref 0–50)
ANION GAP SERPL CALCULATED.3IONS-SCNC: 11 MMOL/L (ref 7–16)
AST SERPL-CCNC: 23 U/L (ref 0–50)
BASOPHILS # BLD: 0.03 K/UL (ref 0–0.2)
BASOPHILS NFR BLD: 0 % (ref 0–2)
BILIRUB SERPL-MCNC: 0.6 MG/DL (ref 0–1.2)
BILIRUB UR QL STRIP: NEGATIVE
BUN SERPL-MCNC: 6 MG/DL (ref 6–20)
CALCIUM SERPL-MCNC: 8.9 MG/DL (ref 8.6–10)
CHLORIDE SERPL-SCNC: 103 MMOL/L (ref 98–107)
CLARITY UR: CLEAR
CO2 SERPL-SCNC: 27 MMOL/L (ref 22–29)
COLOR UR: YELLOW
CREAT SERPL-MCNC: 1.1 MG/DL (ref 0.7–1.2)
EOSINOPHIL # BLD: 0.1 K/UL (ref 0.05–0.5)
EOSINOPHILS RELATIVE PERCENT: 1 % (ref 0–6)
ERYTHROCYTE [DISTWIDTH] IN BLOOD BY AUTOMATED COUNT: 11.9 % (ref 11.5–15)
GFR, ESTIMATED: 89 ML/MIN/1.73M2
GLUCOSE SERPL-MCNC: 96 MG/DL (ref 74–99)
GLUCOSE UR STRIP-MCNC: NEGATIVE MG/DL
HCT VFR BLD AUTO: 44.7 % (ref 37–54)
HGB BLD-MCNC: 15.4 G/DL (ref 12.5–16.5)
HGB UR QL STRIP.AUTO: NEGATIVE
IMM GRANULOCYTES # BLD AUTO: <0.03 K/UL (ref 0–0.58)
IMM GRANULOCYTES NFR BLD: 0 % (ref 0–5)
KETONES UR STRIP-MCNC: ABNORMAL MG/DL
LACTATE BLDV-SCNC: 1.3 MMOL/L (ref 0.5–2.2)
LEUKOCYTE ESTERASE UR QL STRIP: NEGATIVE
LYMPHOCYTES NFR BLD: 2.56 K/UL (ref 1.5–4)
LYMPHOCYTES RELATIVE PERCENT: 36 % (ref 20–42)
MCH RBC QN AUTO: 31.8 PG (ref 26–35)
MCHC RBC AUTO-ENTMCNC: 34.5 G/DL (ref 32–34.5)
MCV RBC AUTO: 92.2 FL (ref 80–99.9)
MONOCYTES NFR BLD: 0.44 K/UL (ref 0.1–0.95)
MONOCYTES NFR BLD: 6 % (ref 2–12)
NEUTROPHILS NFR BLD: 56 % (ref 43–80)
NEUTS SEG NFR BLD: 3.94 K/UL (ref 1.8–7.3)
NITRITE UR QL STRIP: NEGATIVE
PH UR STRIP: 7.5 [PH] (ref 5–8)
PLATELET # BLD AUTO: 185 K/UL (ref 130–450)
PMV BLD AUTO: 10.9 FL (ref 7–12)
POTASSIUM SERPL-SCNC: 3.6 MMOL/L (ref 3.5–5.1)
PROT SERPL-MCNC: 6.2 G/DL (ref 6.4–8.3)
PROT UR STRIP-MCNC: NEGATIVE MG/DL
RBC # BLD AUTO: 4.85 M/UL (ref 3.8–5.8)
RBC #/AREA URNS HPF: ABNORMAL /HPF
SODIUM SERPL-SCNC: 141 MMOL/L (ref 136–145)
SP GR UR STRIP: 1.01 (ref 1–1.03)
TSH SERPL DL<=0.05 MIU/L-ACNC: 0.66 UIU/ML (ref 0.27–4.2)
UROBILINOGEN UR STRIP-ACNC: >8 EU/DL (ref 0–1)
VALPROATE SERPL-MCNC: 65 UG/ML (ref 50–100)
WBC #/AREA URNS HPF: ABNORMAL /HPF
WBC OTHER # BLD: 7.1 K/UL (ref 4.5–11.5)

## 2025-07-31 PROCEDURE — 87086 URINE CULTURE/COLONY COUNT: CPT

## 2025-07-31 PROCEDURE — 96375 TX/PRO/DX INJ NEW DRUG ADDON: CPT

## 2025-07-31 PROCEDURE — 6360000002 HC RX W HCPCS: Performed by: NURSE PRACTITIONER

## 2025-07-31 PROCEDURE — 85025 COMPLETE CBC W/AUTO DIFF WBC: CPT

## 2025-07-31 PROCEDURE — 83605 ASSAY OF LACTIC ACID: CPT

## 2025-07-31 PROCEDURE — 84443 ASSAY THYROID STIM HORMONE: CPT

## 2025-07-31 PROCEDURE — 80053 COMPREHEN METABOLIC PANEL: CPT

## 2025-07-31 PROCEDURE — 6360000004 HC RX CONTRAST MEDICATION: Performed by: RADIOLOGY

## 2025-07-31 PROCEDURE — 81001 URINALYSIS AUTO W/SCOPE: CPT

## 2025-07-31 PROCEDURE — 2580000003 HC RX 258: Performed by: NURSE PRACTITIONER

## 2025-07-31 PROCEDURE — 99285 EMERGENCY DEPT VISIT HI MDM: CPT

## 2025-07-31 PROCEDURE — 74177 CT ABD & PELVIS W/CONTRAST: CPT

## 2025-07-31 PROCEDURE — 96374 THER/PROPH/DIAG INJ IV PUSH: CPT

## 2025-07-31 PROCEDURE — 80164 ASSAY DIPROPYLACETIC ACD TOT: CPT

## 2025-07-31 PROCEDURE — 6370000000 HC RX 637 (ALT 250 FOR IP): Performed by: NURSE PRACTITIONER

## 2025-07-31 RX ORDER — ONDANSETRON 4 MG/1
4 TABLET, FILM COATED ORAL EVERY 8 HOURS PRN
Qty: 10 TABLET | Refills: 0 | Status: SHIPPED | OUTPATIENT
Start: 2025-07-31

## 2025-07-31 RX ORDER — KETOROLAC TROMETHAMINE 15 MG/ML
15 INJECTION, SOLUTION INTRAMUSCULAR; INTRAVENOUS ONCE
Status: COMPLETED | OUTPATIENT
Start: 2025-07-31 | End: 2025-07-31

## 2025-07-31 RX ORDER — 0.9 % SODIUM CHLORIDE 0.9 %
1000 INTRAVENOUS SOLUTION INTRAVENOUS ONCE
Status: COMPLETED | OUTPATIENT
Start: 2025-07-31 | End: 2025-07-31

## 2025-07-31 RX ORDER — ONDANSETRON 2 MG/ML
4 INJECTION INTRAMUSCULAR; INTRAVENOUS ONCE
Status: COMPLETED | OUTPATIENT
Start: 2025-07-31 | End: 2025-07-31

## 2025-07-31 RX ORDER — IOPAMIDOL 755 MG/ML
75 INJECTION, SOLUTION INTRAVASCULAR
Status: COMPLETED | OUTPATIENT
Start: 2025-07-31 | End: 2025-07-31

## 2025-07-31 RX ORDER — POLYETHYLENE GLYCOL 3350 17 G/17G
POWDER, FOR SOLUTION ORAL
Qty: 238 G | Refills: 0 | Status: SHIPPED | OUTPATIENT
Start: 2025-07-31 | End: 2025-08-14

## 2025-07-31 RX ADMIN — ONDANSETRON 4 MG: 2 INJECTION, SOLUTION INTRAMUSCULAR; INTRAVENOUS at 17:52

## 2025-07-31 RX ADMIN — MAJOR MAGNESIUM CITRATE ORAL SOLUTION - LEMON 296 ML: 1.75 LIQUID ORAL at 21:45

## 2025-07-31 RX ADMIN — KETOROLAC TROMETHAMINE 15 MG: 15 INJECTION, SOLUTION INTRAMUSCULAR; INTRAVENOUS at 17:52

## 2025-07-31 RX ADMIN — IOPAMIDOL 75 ML: 755 INJECTION, SOLUTION INTRAVENOUS at 19:15

## 2025-07-31 RX ADMIN — SODIUM CHLORIDE 1000 ML: 0.9 INJECTION, SOLUTION INTRAVENOUS at 17:51

## 2025-08-01 LAB
MICROORGANISM SPEC CULT: ABNORMAL
SERVICE CMNT-IMP: ABNORMAL
SPECIMEN DESCRIPTION: ABNORMAL

## 2025-08-01 NOTE — DISCHARGE INSTRUCTIONS
CT ABDOMEN PELVIS W IV CONTRAST Additional Contrast? None   Final Result   1. No acute intra-abdominal or pelvic process.   2. Moderate volume of retained fecal debris within the colon, could represent   signs of developing constipation.   3. Borderline hepatomegaly   4. Borderline left femoral lymph node.

## 2025-08-01 NOTE — DISCHARGE INSTR - COC
Continuity of Care Form    Patient Name: Jaden Savage III   :  1995  MRN:  [id number]    Admit date:  2025  Discharge date:  ***    Code Status Order: Prior   Advance Directives:     Admitting Physician:  No admitting provider for patient encounter.  PCP: Myles Chakraborty DO    Discharging Nurse: ***  Discharging Hospital Unit/Room#: NINFA/NINFA  Discharging Unit Phone Number: ***    Emergency Contact:   Extended Emergency Contact Information  Primary Emergency Contact: Joi Savage  Address: [address]           [address]           [address] [place]  Home Phone: [phone]  Mobile Phone: [phone]  Relation: Parent  Secondary Emergency Contact: NixAngie  Home Phone: [phone]  Mobile Phone: [phone]  Relation: Aunt/Uncle    Past Surgical History:  Past Surgical History:   Procedure Laterality Date    FOOT SURGERY      HERNIA REPAIR      TONSILLECTOMY         Immunization History:     There is no immunization history on file for this patient.    Active Problems:  Patient Active Problem List   Diagnosis Code    Cannabis use disorder, severe, dependence ([facility]) F12.20    Tobacco use disorder, continuous F17.209    Severe recurrent major depression with psychotic features ([facility]) F33.3    Depression F32.A    Gender dysphoria in adult F64.0    Suicidal ideation R45.851    Depression, major, single episode F32.9    Bipolar 1 disorder, manic, moderate ([facility]) F31.12    Bipolar 1 disorder, depressed ([facility]) F31.9    Bipolar 1 disorder ([facility]) F31.9       Isolation/Infection:   Isolation            No Isolation          Patient Infection Status    None to display              Nurse Assessment:  Last Vital Signs: /74   Pulse 70   Temp 98.2 °F (36.8 °C) (Oral)   Resp 17   Wt 72.6 kg (160 lb)   SpO2 99%   BMI 24.33 kg/m²     Last documented pain score (0-10 scale):    Last Weight:   Wt Readings from Last 1 Encounters:   25 72.6 kg (160 lb)     Mental Status:

## 2025-08-22 ENCOUNTER — HOSPITAL ENCOUNTER (OUTPATIENT)
Age: 30
Discharge: HOME | End: 2025-08-24

## 2025-08-22 PROCEDURE — 88305 TISSUE EXAM BY PATHOLOGIST: CPT

## 2025-08-27 LAB — SURGICAL PATHOLOGY REPORT: NORMAL
